# Patient Record
Sex: MALE | Race: WHITE | Employment: FULL TIME | URBAN - NONMETROPOLITAN AREA
[De-identification: names, ages, dates, MRNs, and addresses within clinical notes are randomized per-mention and may not be internally consistent; named-entity substitution may affect disease eponyms.]

---

## 2017-01-05 ENCOUNTER — OFFICE VISIT (OUTPATIENT)
Dept: UROLOGY | Age: 57
End: 2017-01-05

## 2017-01-05 VITALS
BODY MASS INDEX: 28.88 KG/M2 | WEIGHT: 195 LBS | HEIGHT: 69 IN | DIASTOLIC BLOOD PRESSURE: 86 MMHG | SYSTOLIC BLOOD PRESSURE: 128 MMHG

## 2017-01-05 DIAGNOSIS — R68.82 DECREASED LIBIDO: ICD-10-CM

## 2017-01-05 DIAGNOSIS — N41.1 CHRONIC PROSTATITIS: Primary | ICD-10-CM

## 2017-01-05 LAB
BILIRUBIN URINE: NEGATIVE
BLOOD URINE, POC: NEGATIVE
CHARACTER, URINE: CLEAR
COLOR, URINE: YELLOW
GLUCOSE URINE: NEGATIVE MG/DL
KETONES, URINE: NEGATIVE
LEUKOCYTE CLUMPS, URINE: NEGATIVE
NITRITE, URINE: NEGATIVE
PH, URINE: 7
POST VOID RESIDUAL (PVR): 54 ML
PROTEIN, URINE: NEGATIVE MG/DL
SPECIFIC GRAVITY, URINE: 1.02 (ref 1–1.03)
UROBILINOGEN, URINE: 0.2 EU/DL

## 2017-01-05 PROCEDURE — 99214 OFFICE O/P EST MOD 30 MIN: CPT | Performed by: UROLOGY

## 2017-01-05 PROCEDURE — 81003 URINALYSIS AUTO W/O SCOPE: CPT | Performed by: UROLOGY

## 2017-01-05 PROCEDURE — 51741 ELECTRO-UROFLOWMETRY FIRST: CPT | Performed by: UROLOGY

## 2017-01-05 PROCEDURE — 51798 US URINE CAPACITY MEASURE: CPT | Performed by: UROLOGY

## 2017-01-05 RX ORDER — TESTOSTERONE 16.2 MG/G
2 GEL TRANSDERMAL DAILY
Qty: 88 G | Refills: 5 | Status: SHIPPED | OUTPATIENT
Start: 2017-01-05 | End: 2017-03-06 | Stop reason: SDUPTHER

## 2017-01-05 ASSESSMENT — ENCOUNTER SYMPTOMS
EYE REDNESS: 0
EYE PAIN: 0
ABDOMINAL PAIN: 0
NAUSEA: 0
BACK PAIN: 0
CHEST TIGHTNESS: 0
SHORTNESS OF BREATH: 0
COLOR CHANGE: 0

## 2017-01-07 LAB
SEX HORMONE BINDING GLOBULIN: 33.6 NMOL/L (ref 19.3–76.4)
TESTOSTERONE FREE, CALC: 2.3 NG/DL (ref 4.8–25.7)
TESTOSTERONE FREE: 124 NG/DL (ref 300–890)

## 2017-01-09 ENCOUNTER — TELEPHONE (OUTPATIENT)
Dept: UROLOGY | Age: 57
End: 2017-01-09

## 2017-01-10 ENCOUNTER — TELEPHONE (OUTPATIENT)
Dept: UROLOGY | Age: 57
End: 2017-01-10

## 2017-01-10 DIAGNOSIS — R79.89 LOW TESTOSTERONE: Primary | ICD-10-CM

## 2017-01-27 ENCOUNTER — OFFICE VISIT (OUTPATIENT)
Dept: FAMILY MEDICINE CLINIC | Age: 57
End: 2017-01-27

## 2017-01-27 VITALS
TEMPERATURE: 97.7 F | HEIGHT: 69 IN | WEIGHT: 200.6 LBS | DIASTOLIC BLOOD PRESSURE: 70 MMHG | RESPIRATION RATE: 14 BRPM | SYSTOLIC BLOOD PRESSURE: 100 MMHG | HEART RATE: 72 BPM | BODY MASS INDEX: 29.71 KG/M2

## 2017-01-27 DIAGNOSIS — J01.40 ACUTE PANSINUSITIS, RECURRENCE NOT SPECIFIED: ICD-10-CM

## 2017-01-27 DIAGNOSIS — H65.03 BILATERAL ACUTE SEROUS OTITIS MEDIA, RECURRENCE NOT SPECIFIED: Primary | ICD-10-CM

## 2017-01-27 PROCEDURE — 99213 OFFICE O/P EST LOW 20 MIN: CPT | Performed by: FAMILY MEDICINE

## 2017-01-27 RX ORDER — CEFDINIR 300 MG/1
300 CAPSULE ORAL 2 TIMES DAILY
Qty: 20 CAPSULE | Refills: 0 | Status: SHIPPED | OUTPATIENT
Start: 2017-01-27 | End: 2017-02-06

## 2017-01-27 ASSESSMENT — ENCOUNTER SYMPTOMS
SORE THROAT: 1
SINUS PRESSURE: 1
COUGH: 1
GASTROINTESTINAL NEGATIVE: 1

## 2017-02-11 LAB — TESTOSTERONE TOTAL: 181 NG/DL

## 2017-02-16 ENCOUNTER — TELEPHONE (OUTPATIENT)
Dept: UROLOGY | Age: 57
End: 2017-02-16

## 2017-03-06 RX ORDER — TESTOSTERONE 16.2 MG/G
3 GEL TRANSDERMAL DAILY
Qty: 88 G | Refills: 5 | Status: SHIPPED | OUTPATIENT
Start: 2017-03-06 | End: 2017-03-28 | Stop reason: SDUPTHER

## 2017-03-14 LAB — TESTOSTERONE TOTAL: 116 NG/DL

## 2017-03-16 DIAGNOSIS — R79.89 LOW TESTOSTERONE: ICD-10-CM

## 2017-03-21 ENCOUNTER — TELEPHONE (OUTPATIENT)
Dept: UROLOGY | Age: 57
End: 2017-03-21

## 2017-03-23 ENCOUNTER — TELEPHONE (OUTPATIENT)
Dept: UROLOGY | Age: 57
End: 2017-03-23

## 2017-03-23 DIAGNOSIS — E29.1 MALE HYPOGONADISM: Primary | ICD-10-CM

## 2017-03-27 ENCOUNTER — TELEPHONE (OUTPATIENT)
Dept: UROLOGY | Age: 57
End: 2017-03-27

## 2017-03-28 RX ORDER — TESTOSTERONE 16.2 MG/G
4 GEL TRANSDERMAL DAILY
Qty: 176 G | Refills: 5 | Status: SHIPPED | OUTPATIENT
Start: 2017-03-28 | End: 2017-10-14 | Stop reason: SDUPTHER

## 2017-05-01 ENCOUNTER — OFFICE VISIT (OUTPATIENT)
Dept: UROLOGY | Age: 57
End: 2017-05-01

## 2017-05-01 VITALS
SYSTOLIC BLOOD PRESSURE: 122 MMHG | BODY MASS INDEX: 30.36 KG/M2 | HEIGHT: 69 IN | WEIGHT: 205 LBS | DIASTOLIC BLOOD PRESSURE: 84 MMHG

## 2017-05-01 DIAGNOSIS — N40.1 BPH WITH OBSTRUCTION/LOWER URINARY TRACT SYMPTOMS: ICD-10-CM

## 2017-05-01 DIAGNOSIS — E29.1 MALE HYPOGONADISM: Primary | ICD-10-CM

## 2017-05-01 DIAGNOSIS — N13.8 BPH WITH OBSTRUCTION/LOWER URINARY TRACT SYMPTOMS: ICD-10-CM

## 2017-05-01 LAB
BILIRUBIN URINE: NEGATIVE
BLOOD URINE, POC: NEGATIVE
CHARACTER, URINE: CLEAR
COLOR, URINE: YELLOW
GLUCOSE URINE: NEGATIVE MG/DL
KETONES, URINE: NEGATIVE
LEUKOCYTE CLUMPS, URINE: NEGATIVE
NITRITE, URINE: NEGATIVE
PH, URINE: 6
POST VOID RESIDUAL (PVR): 60 ML
PROTEIN, URINE: NEGATIVE MG/DL
SPECIFIC GRAVITY, URINE: 1.02 (ref 1–1.03)
UROBILINOGEN, URINE: 0.2 EU/DL

## 2017-05-01 PROCEDURE — 99213 OFFICE O/P EST LOW 20 MIN: CPT | Performed by: NURSE PRACTITIONER

## 2017-05-01 PROCEDURE — G8417 CALC BMI ABV UP PARAM F/U: HCPCS | Performed by: NURSE PRACTITIONER

## 2017-05-01 PROCEDURE — 51798 US URINE CAPACITY MEASURE: CPT | Performed by: NURSE PRACTITIONER

## 2017-05-01 PROCEDURE — 3017F COLORECTAL CA SCREEN DOC REV: CPT | Performed by: NURSE PRACTITIONER

## 2017-05-01 PROCEDURE — G8427 DOCREV CUR MEDS BY ELIG CLIN: HCPCS | Performed by: NURSE PRACTITIONER

## 2017-05-01 PROCEDURE — 81003 URINALYSIS AUTO W/O SCOPE: CPT | Performed by: NURSE PRACTITIONER

## 2017-05-01 PROCEDURE — 1036F TOBACCO NON-USER: CPT | Performed by: NURSE PRACTITIONER

## 2017-06-26 ENCOUNTER — TELEPHONE (OUTPATIENT)
Dept: ADMINISTRATIVE | Age: 57
End: 2017-06-26

## 2017-06-26 DIAGNOSIS — Z00.00 ANNUAL PHYSICAL EXAM: Primary | ICD-10-CM

## 2017-07-05 LAB
ALBUMIN SERPL-MCNC: 4.5 G/DL (ref 3.2–5.3)
ALK PHOSPHATASE: 61 IU/L (ref 35–121)
ALT SERPL-CCNC: 26 IU/L (ref 5–59)
ANION GAP SERPL CALCULATED.3IONS-SCNC: 12 MMOL/L
AST SERPL-CCNC: 25 IU/L (ref 10–42)
BILIRUB SERPL-MCNC: 0.5 MG/DL (ref 0.2–1.3)
BUN BLDV-MCNC: 22 MG/DL (ref 10–20)
CALCIUM SERPL-MCNC: 9.5 MG/DL (ref 8.7–10.8)
CHLORIDE BLD-SCNC: 98 MMOL/L (ref 95–111)
CHOLESTEROL, TOTAL: 187 MG/DL
CHOLESTEROL/HDL RATIO: 5.7
CHOLESTEROL/HDL RATIO: ABNORMAL
CHOLESTEROL: 187 MG/DL
CO2: 29 MMOL/L (ref 21–32)
CREAT SERPL-MCNC: 1.3 MG/DL (ref 0.5–1.3)
EGFR AFRICAN AMERICAN: 69
EGFR IF NONAFRICAN AMERICAN: 57
GLUCOSE: 110 MG/DL (ref 70–100)
HDLC SERPL-MCNC: 33 MG/DL (ref 35–70)
HDLC SERPL-MCNC: 33 MG/DL (ref 40–60)
LDL CHOLESTEROL CALCULATED: 83 MG/DL
LDL CHOLESTEROL CALCULATED: 83 MG/DL (ref 0–160)
LDL/HDL RATIO: 2.5
POTASSIUM SERPL-SCNC: 3.7 MMOL/L (ref 3.5–5.4)
SODIUM BLD-SCNC: 135 MMOL/L (ref 134–147)
TOTAL PROTEIN: 6.6 G/DL (ref 5.8–8)
TRIGL SERPL-MCNC: 354 MG/DL
TRIGL SERPL-MCNC: 354 MG/DL
VLDLC SERPL CALC-MCNC: 71 MG/DL
VLDLC SERPL CALC-MCNC: ABNORMAL MG/DL

## 2017-07-13 ENCOUNTER — OFFICE VISIT (OUTPATIENT)
Dept: FAMILY MEDICINE CLINIC | Age: 57
End: 2017-07-13

## 2017-07-13 VITALS
DIASTOLIC BLOOD PRESSURE: 70 MMHG | BODY MASS INDEX: 28.66 KG/M2 | RESPIRATION RATE: 12 BRPM | WEIGHT: 200.2 LBS | SYSTOLIC BLOOD PRESSURE: 118 MMHG | HEIGHT: 70 IN | HEART RATE: 72 BPM

## 2017-07-13 DIAGNOSIS — K21.9 GASTROESOPHAGEAL REFLUX DISEASE, ESOPHAGITIS PRESENCE NOT SPECIFIED: ICD-10-CM

## 2017-07-13 DIAGNOSIS — I10 ESSENTIAL HYPERTENSION: ICD-10-CM

## 2017-07-13 DIAGNOSIS — B00.9 HERPES INFECTION: ICD-10-CM

## 2017-07-13 DIAGNOSIS — Z00.00 ANNUAL PHYSICAL EXAM: Primary | ICD-10-CM

## 2017-07-13 PROCEDURE — 99396 PREV VISIT EST AGE 40-64: CPT | Performed by: FAMILY MEDICINE

## 2017-07-13 RX ORDER — VALACYCLOVIR HYDROCHLORIDE 500 MG/1
500 TABLET, FILM COATED ORAL DAILY
Qty: 90 TABLET | Refills: 3 | Status: SHIPPED | OUTPATIENT
Start: 2017-07-13 | End: 2018-06-19 | Stop reason: SDUPTHER

## 2017-07-13 RX ORDER — METOPROLOL SUCCINATE 100 MG/1
100 TABLET, EXTENDED RELEASE ORAL DAILY
Qty: 90 TABLET | Refills: 3 | Status: SHIPPED | OUTPATIENT
Start: 2017-07-13 | End: 2018-06-19 | Stop reason: SDUPTHER

## 2017-07-13 RX ORDER — VALSARTAN AND HYDROCHLOROTHIAZIDE 320; 25 MG/1; MG/1
TABLET, FILM COATED ORAL
Qty: 90 TABLET | Refills: 3 | Status: SHIPPED | OUTPATIENT
Start: 2017-07-13 | End: 2018-06-19 | Stop reason: SDUPTHER

## 2017-07-13 RX ORDER — ESOMEPRAZOLE MAGNESIUM 40 MG/1
40 CAPSULE, DELAYED RELEASE ORAL
Qty: 90 CAPSULE | Refills: 3 | Status: SHIPPED | OUTPATIENT
Start: 2017-07-13 | End: 2018-06-19 | Stop reason: SDUPTHER

## 2017-07-13 RX ORDER — TESTOSTERONE 16.2 MG/G
4 GEL TRANSDERMAL DAILY
Qty: 176 G | Refills: 5 | Status: CANCELLED | OUTPATIENT
Start: 2017-07-13 | End: 2017-08-12

## 2017-07-13 ASSESSMENT — ENCOUNTER SYMPTOMS
SHORTNESS OF BREATH: 0
CHEST TIGHTNESS: 0
BLOOD IN STOOL: 0
ABDOMINAL PAIN: 0
EYES NEGATIVE: 1

## 2017-07-13 ASSESSMENT — PATIENT HEALTH QUESTIONNAIRE - PHQ9
1. LITTLE INTEREST OR PLEASURE IN DOING THINGS: 0
2. FEELING DOWN, DEPRESSED OR HOPELESS: 0
SUM OF ALL RESPONSES TO PHQ QUESTIONS 1-9: 0
SUM OF ALL RESPONSES TO PHQ9 QUESTIONS 1 & 2: 0

## 2017-07-25 LAB
A/G RATIO: NORMAL
ALBUMIN SERPL-MCNC: 4.9 G/DL
ALBUMIN SERPL-MCNC: 4.9 G/DL (ref 3.2–5.3)
ALK PHOSPHATASE: 60 IU/L (ref 35–121)
ALP BLD-CCNC: 60 U/L
ALT SERPL-CCNC: 25 IU/L (ref 5–59)
ALT SERPL-CCNC: 25 U/L
AST SERPL-CCNC: 21 IU/L (ref 10–42)
AST SERPL-CCNC: 21 U/L
BILIRUB SERPL-MCNC: 0.5 MG/DL (ref 0.1–1.4)
BILIRUB SERPL-MCNC: 0.5 MG/DL (ref 0.2–1.3)
BILIRUBIN DIRECT: 0.1 MG/DL
BILIRUBIN DIRECT: 0.1 MG/DL (ref 0–0.2)
BILIRUBIN, INDIRECT: NORMAL
CHOLESTEROL, TOTAL: 189 MG/DL
CHOLESTEROL/HDL RATIO: 5.9
CHOLESTEROL/HDL RATIO: 5.9
CHOLESTEROL: 189 MG/DL
GLOBULIN: NORMAL
HCT VFR BLD CALC: 41.7 % (ref 41.4–51)
HCT VFR BLD CALC: 41.7 % (ref 41–53)
HDLC SERPL-MCNC: 32 MG/DL (ref 35–70)
HDLC SERPL-MCNC: 32 MG/DL (ref 40–60)
HEMOGLOBIN: 14.2 G/DL (ref 13.5–17.5)
HEMOGLOBIN: 14.2 G/DL (ref 13.8–17)
LDL CHOLESTEROL CALCULATED: 102 MG/DL
LDL CHOLESTEROL CALCULATED: 102 MG/DL (ref 0–160)
LDL/HDL RATIO: 3.2
PROSTATE SPECIFIC ANTIGEN: 0.21 NG/ML
PROTEIN TOTAL: 7.2 G/DL
PSA, ULTRASENSITIVE: 0.21 NG/ML
TESTOSTERONE TOTAL: 122
TESTOSTERONE TOTAL: 122 NG/DL
TOTAL PROTEIN: 7.2 G/DL (ref 5.8–8)
TRIGL SERPL-MCNC: 277 MG/DL
TRIGL SERPL-MCNC: 277 MG/DL
VLDLC SERPL CALC-MCNC: 55 MG/DL
VLDLC SERPL CALC-MCNC: 55 MG/DL

## 2017-07-31 ENCOUNTER — OFFICE VISIT (OUTPATIENT)
Dept: UROLOGY | Age: 57
End: 2017-07-31
Payer: COMMERCIAL

## 2017-07-31 VITALS
WEIGHT: 200 LBS | DIASTOLIC BLOOD PRESSURE: 84 MMHG | HEIGHT: 69 IN | SYSTOLIC BLOOD PRESSURE: 122 MMHG | BODY MASS INDEX: 29.62 KG/M2

## 2017-07-31 DIAGNOSIS — E29.1 HYPOGONADISM IN MALE: Primary | ICD-10-CM

## 2017-07-31 PROCEDURE — 1036F TOBACCO NON-USER: CPT | Performed by: NURSE PRACTITIONER

## 2017-07-31 PROCEDURE — 3017F COLORECTAL CA SCREEN DOC REV: CPT | Performed by: NURSE PRACTITIONER

## 2017-07-31 PROCEDURE — 99213 OFFICE O/P EST LOW 20 MIN: CPT | Performed by: NURSE PRACTITIONER

## 2017-07-31 PROCEDURE — G8417 CALC BMI ABV UP PARAM F/U: HCPCS | Performed by: NURSE PRACTITIONER

## 2017-07-31 PROCEDURE — G8428 CUR MEDS NOT DOCUMENT: HCPCS | Performed by: NURSE PRACTITIONER

## 2017-08-28 ENCOUNTER — TELEPHONE (OUTPATIENT)
Dept: FAMILY MEDICINE CLINIC | Age: 57
End: 2017-08-28

## 2017-10-19 ENCOUNTER — TELEPHONE (OUTPATIENT)
Dept: UROLOGY | Age: 57
End: 2017-10-19

## 2017-10-20 RX ORDER — TESTOSTERONE 16.2 MG/G
4 GEL TRANSDERMAL DAILY
Qty: 2 BOTTLE | Refills: 3 | Status: SHIPPED | OUTPATIENT
Start: 2017-10-20 | End: 2018-03-09 | Stop reason: SDUPTHER

## 2017-11-16 ENCOUNTER — TELEPHONE (OUTPATIENT)
Dept: FAMILY MEDICINE CLINIC | Age: 57
End: 2017-11-16

## 2017-11-16 NOTE — TELEPHONE ENCOUNTER
11/16/17  Dolv: 7/13/17 Patient requesting chiropractic referral to [facility] for consult as states his insurance requires. Patient having tingling/numbness in Lt fingers and pain in neck on left side near scalp. Patient declined appointment.   Thanks/blm

## 2018-01-27 LAB — PSA, ULTRASENSITIVE: 0.25 NG/ML

## 2018-03-13 ENCOUNTER — TELEPHONE (OUTPATIENT)
Dept: UROLOGY | Age: 58
End: 2018-03-13

## 2018-03-13 RX ORDER — TESTOSTERONE 16.2 MG/G
4 GEL TRANSDERMAL DAILY
Qty: 2 BOTTLE | Refills: 1 | Status: SHIPPED | OUTPATIENT
Start: 2018-03-13 | End: 2018-05-28 | Stop reason: SDUPTHER

## 2018-05-31 ENCOUNTER — TELEPHONE (OUTPATIENT)
Dept: FAMILY MEDICINE CLINIC | Age: 58
End: 2018-05-31

## 2018-05-31 DIAGNOSIS — I10 ESSENTIAL HYPERTENSION: Primary | ICD-10-CM

## 2018-05-31 LAB
ALBUMIN SERPL-MCNC: 4.5 G/DL (ref 3.5–5.2)
ALK PHOSPHATASE: 55 U/L (ref 39–118)
ALT SERPL-CCNC: 36 U/L (ref 5–50)
ANION GAP SERPL CALCULATED.3IONS-SCNC: 11 MEQ/L (ref 10–19)
AST SERPL-CCNC: 28 U/L (ref 9–50)
BILIRUB SERPL-MCNC: 0.4 MG/DL
BILIRUBIN DIRECT: <0.2 MG/DL
BUN BLDV-MCNC: 17 MG/DL (ref 8–23)
CALCIUM SERPL-MCNC: 9.4 MG/DL (ref 8.5–10.5)
CHLORIDE BLD-SCNC: 97 MEQ/L (ref 95–107)
CHOLESTEROL/HDL RATIO: 5.9
CHOLESTEROL: 190 MG/DL
CO2: 30 MEQ/L (ref 19–31)
CREAT SERPL-MCNC: 1.1 MG/DL (ref 0.8–1.4)
EGFR AFRICAN AMERICAN: 85.3 ML/MIN/1.73 M2
EGFR IF NONAFRICAN AMERICAN: 73.6 ML/MIN/1.73 M2
GLUCOSE: 128 MG/DL (ref 70–99)
HCT VFR BLD CALC: 40.8 % (ref 41.4–51)
HDLC SERPL-MCNC: 32 MG/DL
HEMOGLOBIN: 13.9 G/DL (ref 13.8–17)
LDL CHOLESTEROL CALCULATED: 105 MG/DL
LDL/HDL RATIO: 3.3
POTASSIUM SERPL-SCNC: 5.1 MEQ/L (ref 3.5–5.4)
SODIUM BLD-SCNC: 138 MEQ/L (ref 135–146)
TOTAL PROTEIN: 7 G/DL (ref 6.1–8.3)
TRIGL SERPL-MCNC: 263 MG/DL
VLDLC SERPL CALC-MCNC: 53 MG/DL

## 2018-06-01 ENCOUNTER — TELEPHONE (OUTPATIENT)
Dept: FAMILY MEDICINE CLINIC | Age: 58
End: 2018-06-01

## 2018-06-01 DIAGNOSIS — R73.01 IMPAIRED FASTING GLUCOSE: Primary | ICD-10-CM

## 2018-06-01 LAB
PSA, ULTRASENSITIVE: 0.21 NG/ML
TESTOSTERONE TOTAL: 301 NG/DL (ref 300–890)

## 2018-06-05 LAB
AVERAGE GLUCOSE: 131 MG/DL (ref 66–114)
HBA1C MFR BLD: 6.2 % (ref 4.2–5.8)

## 2018-06-19 ENCOUNTER — OFFICE VISIT (OUTPATIENT)
Dept: FAMILY MEDICINE CLINIC | Age: 58
End: 2018-06-19
Payer: COMMERCIAL

## 2018-06-19 VITALS
WEIGHT: 201 LBS | RESPIRATION RATE: 16 BRPM | HEIGHT: 69 IN | DIASTOLIC BLOOD PRESSURE: 74 MMHG | TEMPERATURE: 97.7 F | SYSTOLIC BLOOD PRESSURE: 106 MMHG | BODY MASS INDEX: 29.77 KG/M2 | HEART RATE: 68 BPM

## 2018-06-19 DIAGNOSIS — I10 ESSENTIAL HYPERTENSION: ICD-10-CM

## 2018-06-19 DIAGNOSIS — K21.9 GASTROESOPHAGEAL REFLUX DISEASE, ESOPHAGITIS PRESENCE NOT SPECIFIED: ICD-10-CM

## 2018-06-19 DIAGNOSIS — B00.9 HERPES INFECTION: ICD-10-CM

## 2018-06-19 DIAGNOSIS — Z00.00 ANNUAL PHYSICAL EXAM: Primary | ICD-10-CM

## 2018-06-19 DIAGNOSIS — R73.01 IFG (IMPAIRED FASTING GLUCOSE): ICD-10-CM

## 2018-06-19 PROCEDURE — 99396 PREV VISIT EST AGE 40-64: CPT | Performed by: FAMILY MEDICINE

## 2018-06-19 RX ORDER — ESOMEPRAZOLE MAGNESIUM 40 MG/1
40 CAPSULE, DELAYED RELEASE ORAL
Qty: 90 CAPSULE | Refills: 3 | Status: SHIPPED | OUTPATIENT
Start: 2018-06-19 | End: 2019-04-11 | Stop reason: SDUPTHER

## 2018-06-19 RX ORDER — VALSARTAN AND HYDROCHLOROTHIAZIDE 320; 25 MG/1; MG/1
TABLET, FILM COATED ORAL
Qty: 90 TABLET | Refills: 3 | Status: SHIPPED | OUTPATIENT
Start: 2018-06-19 | End: 2019-06-18 | Stop reason: SDUPTHER

## 2018-06-19 RX ORDER — MELOXICAM 15 MG/1
TABLET ORAL
Refills: 0 | COMMUNITY
Start: 2018-05-28 | End: 2019-06-18 | Stop reason: ALTCHOICE

## 2018-06-19 RX ORDER — METOPROLOL SUCCINATE 100 MG/1
100 TABLET, EXTENDED RELEASE ORAL DAILY
Qty: 90 TABLET | Refills: 3 | Status: SHIPPED | OUTPATIENT
Start: 2018-06-19 | End: 2019-06-18 | Stop reason: SDUPTHER

## 2018-06-19 RX ORDER — VALACYCLOVIR HYDROCHLORIDE 500 MG/1
500 TABLET, FILM COATED ORAL DAILY
Qty: 90 TABLET | Refills: 3 | Status: SHIPPED | OUTPATIENT
Start: 2018-06-19 | End: 2019-06-18 | Stop reason: SDUPTHER

## 2018-06-19 ASSESSMENT — ENCOUNTER SYMPTOMS
BLOOD IN STOOL: 0
ABDOMINAL PAIN: 0
EYES NEGATIVE: 1
CHEST TIGHTNESS: 0
SHORTNESS OF BREATH: 0

## 2018-07-31 ENCOUNTER — OFFICE VISIT (OUTPATIENT)
Dept: UROLOGY | Age: 58
End: 2018-07-31
Payer: COMMERCIAL

## 2018-07-31 VITALS
DIASTOLIC BLOOD PRESSURE: 89 MMHG | BODY MASS INDEX: 29.92 KG/M2 | HEIGHT: 69 IN | WEIGHT: 202 LBS | SYSTOLIC BLOOD PRESSURE: 132 MMHG

## 2018-07-31 DIAGNOSIS — E34.9 TESTOSTERONE DEFICIENCY: Primary | ICD-10-CM

## 2018-07-31 LAB
BILIRUBIN URINE: NEGATIVE
BLOOD URINE, POC: NEGATIVE
CHARACTER, URINE: CLEAR
COLOR, URINE: YELLOW
GLUCOSE URINE: NEGATIVE MG/DL
KETONES, URINE: NEGATIVE
LEUKOCYTE CLUMPS, URINE: NEGATIVE
NITRITE, URINE: NEGATIVE
PH, URINE: 7
PROTEIN, URINE: NEGATIVE MG/DL
SPECIFIC GRAVITY, URINE: 1.02 (ref 1–1.03)
UROBILINOGEN, URINE: 0.2 EU/DL

## 2018-07-31 PROCEDURE — 81003 URINALYSIS AUTO W/O SCOPE: CPT | Performed by: NURSE PRACTITIONER

## 2018-07-31 PROCEDURE — 99213 OFFICE O/P EST LOW 20 MIN: CPT | Performed by: NURSE PRACTITIONER

## 2018-07-31 PROCEDURE — G8427 DOCREV CUR MEDS BY ELIG CLIN: HCPCS | Performed by: NURSE PRACTITIONER

## 2018-07-31 PROCEDURE — G8417 CALC BMI ABV UP PARAM F/U: HCPCS | Performed by: NURSE PRACTITIONER

## 2018-07-31 PROCEDURE — 1036F TOBACCO NON-USER: CPT | Performed by: NURSE PRACTITIONER

## 2018-07-31 PROCEDURE — 3017F COLORECTAL CA SCREEN DOC REV: CPT | Performed by: NURSE PRACTITIONER

## 2018-08-08 ENCOUNTER — TELEPHONE (OUTPATIENT)
Dept: UROLOGY | Age: 58
End: 2018-08-08

## 2018-08-08 DIAGNOSIS — E34.9 TESTOSTERONE DEFICIENCY: ICD-10-CM

## 2018-08-08 RX ORDER — TESTOSTERONE 16.2 MG/G
GEL TRANSDERMAL
Qty: 150 G | Refills: 0 | Status: CANCELLED | OUTPATIENT
Start: 2018-08-08 | End: 2019-01-08

## 2018-08-09 RX ORDER — TESTOSTERONE 16.2 MG/G
4 GEL TRANSDERMAL DAILY
Qty: 2 BOTTLE | Refills: 3 | Status: SHIPPED | OUTPATIENT
Start: 2018-08-09 | End: 2018-12-29 | Stop reason: SDUPTHER

## 2018-09-15 LAB
AVERAGE GLUCOSE: 117 MG/DL (ref 66–114)
HBA1C MFR BLD: 5.7 % (ref 4.2–5.8)

## 2018-12-29 ENCOUNTER — TELEPHONE (OUTPATIENT)
Dept: UROLOGY | Age: 58
End: 2018-12-29

## 2018-12-29 DIAGNOSIS — E34.9 TESTOSTERONE DEFICIENCY: ICD-10-CM

## 2019-01-02 RX ORDER — TESTOSTERONE 16.2 MG/G
GEL TRANSDERMAL
Qty: 150 G | Refills: 0 | Status: SHIPPED | OUTPATIENT
Start: 2019-01-02 | End: 2019-01-02 | Stop reason: SDUPTHER

## 2019-01-02 RX ORDER — TESTOSTERONE 16.2 MG/G
GEL TRANSDERMAL
Qty: 150 G | Refills: 3 | Status: SHIPPED | OUTPATIENT
Start: 2019-01-02 | End: 2019-08-09

## 2019-04-11 DIAGNOSIS — K21.9 GASTROESOPHAGEAL REFLUX DISEASE, ESOPHAGITIS PRESENCE NOT SPECIFIED: ICD-10-CM

## 2019-04-11 RX ORDER — ESOMEPRAZOLE MAGNESIUM 40 MG/1
40 CAPSULE, DELAYED RELEASE ORAL
Qty: 90 CAPSULE | Refills: 0 | Status: SHIPPED | OUTPATIENT
Start: 2019-04-11 | End: 2019-06-18 | Stop reason: SDUPTHER

## 2019-04-11 NOTE — TELEPHONE ENCOUNTER
Rx EP'd to pharmacy. Please notify patient.       Requested Prescriptions     Signed Prescriptions Disp Refills    esomeprazole (NEXIUM) 40 MG delayed release capsule 90 capsule 0     Sig: Take 1 capsule by mouth every morning (before breakfast)     Authorizing Provider: Ceci Flowers           Electronically signed by Violet Herr MD on 4/11/2019 at 1:06 PM

## 2019-04-11 NOTE — TELEPHONE ENCOUNTER
4/11/19 patient lost his last refill of Nexium 40 mg qd and is requesting #60 replacement to [facility] on [address].   Patient states it needs to be noted of \"lost medicine\" - patient needing ASAP as leaving for [place] in early AM.  Radha/david  Dolv: 6/19/18

## 2019-05-31 ENCOUNTER — TELEPHONE (OUTPATIENT)
Dept: FAMILY MEDICINE CLINIC | Age: 59
End: 2019-05-31

## 2019-05-31 DIAGNOSIS — R73.01 IFG (IMPAIRED FASTING GLUCOSE): ICD-10-CM

## 2019-05-31 DIAGNOSIS — Z00.00 LABORATORY EXAM ORDERED AS PART OF ROUTINE GENERAL MEDICAL EXAMINATION: Primary | ICD-10-CM

## 2019-05-31 NOTE — TELEPHONE ENCOUNTER
Patient scheduled a Wellness Exam for 6/18/19. He's requesting an order for labs to do prior. He will  the order. Please let him know when it's ready.

## 2019-06-04 LAB
ALBUMIN SERPL-MCNC: 4.4 G/DL (ref 3.2–5.3)
ALK PHOSPHATASE: 53 U/L (ref 39–130)
ALT SERPL-CCNC: 24 U/L (ref 0–40)
ANION GAP SERPL CALCULATED.3IONS-SCNC: 8 MMOL/L (ref 4–12)
AST SERPL-CCNC: 21 U/L (ref 0–41)
AVERAGE GLUCOSE: 123 MG/DL (ref 66–114)
BILIRUB SERPL-MCNC: 0.5 MG/DL (ref 0.3–1.2)
BUN BLDV-MCNC: 19 MG/DL (ref 5–23)
CALCIUM SERPL-MCNC: 9.7 MG/DL (ref 8.5–10.5)
CHLORIDE BLD-SCNC: 97 MMOL/L (ref 98–109)
CHOLESTEROL/HDL RATIO: 6.4 (ref 1–5)
CHOLESTEROL: 192 MG/DL (ref 150–200)
CO2: 29 MMOL/L (ref 22–32)
CREAT SERPL-MCNC: 1.11 MG/DL (ref 0.6–1.3)
EGFR AFRICAN AMERICAN: >60 ML/MIN/1.73SQ.M
EGFR IF NONAFRICAN AMERICAN: >60 ML/MIN/1.73SQ.M
GLUCOSE: 120 MG/DL (ref 65–99)
HBA1C MFR BLD: 5.9 %
HDLC SERPL-MCNC: 30 MG/DL
LDL CHOLESTEROL CALCULATED: 101 MG/DL
LDL/HDL RATIO: 3.4
POTASSIUM SERPL-SCNC: 4.4 MMOL/L (ref 3.5–5)
SODIUM BLD-SCNC: 134 MMOL/L (ref 134–146)
TOTAL PROTEIN: 7.1 G/DL (ref 6–8)
TRIGL SERPL-MCNC: 306 MG/DL (ref 27–150)
VLDLC SERPL CALC-MCNC: 61 MG/DL (ref 0–30)

## 2019-06-18 ENCOUNTER — OFFICE VISIT (OUTPATIENT)
Dept: FAMILY MEDICINE CLINIC | Age: 59
End: 2019-06-18
Payer: COMMERCIAL

## 2019-06-18 VITALS
TEMPERATURE: 97.6 F | SYSTOLIC BLOOD PRESSURE: 128 MMHG | RESPIRATION RATE: 16 BRPM | HEART RATE: 72 BPM | DIASTOLIC BLOOD PRESSURE: 76 MMHG | BODY MASS INDEX: 29.99 KG/M2 | HEIGHT: 69 IN | WEIGHT: 202.5 LBS

## 2019-06-18 DIAGNOSIS — K21.9 GASTROESOPHAGEAL REFLUX DISEASE, ESOPHAGITIS PRESENCE NOT SPECIFIED: ICD-10-CM

## 2019-06-18 DIAGNOSIS — E78.1 HYPERTRIGLYCERIDEMIA: ICD-10-CM

## 2019-06-18 DIAGNOSIS — Z00.00 ANNUAL PHYSICAL EXAM: Primary | ICD-10-CM

## 2019-06-18 DIAGNOSIS — I10 ESSENTIAL HYPERTENSION: ICD-10-CM

## 2019-06-18 DIAGNOSIS — B00.9 HERPES INFECTION: ICD-10-CM

## 2019-06-18 DIAGNOSIS — R73.01 IMPAIRED FASTING GLUCOSE: ICD-10-CM

## 2019-06-18 PROCEDURE — 99396 PREV VISIT EST AGE 40-64: CPT | Performed by: FAMILY MEDICINE

## 2019-06-18 RX ORDER — METOPROLOL SUCCINATE 100 MG/1
100 TABLET, EXTENDED RELEASE ORAL DAILY
Qty: 90 TABLET | Refills: 3 | Status: SHIPPED | OUTPATIENT
Start: 2019-06-18 | End: 2019-07-30 | Stop reason: SDUPTHER

## 2019-06-18 RX ORDER — VALACYCLOVIR HYDROCHLORIDE 500 MG/1
500 TABLET, FILM COATED ORAL DAILY
Qty: 90 TABLET | Refills: 3 | Status: SHIPPED | OUTPATIENT
Start: 2019-06-18 | End: 2020-06-08 | Stop reason: SDUPTHER

## 2019-06-18 RX ORDER — IBUPROFEN 200 MG
200 TABLET ORAL EVERY 6 HOURS PRN
COMMUNITY

## 2019-06-18 RX ORDER — ESOMEPRAZOLE MAGNESIUM 40 MG/1
40 CAPSULE, DELAYED RELEASE ORAL
Qty: 90 CAPSULE | Refills: 3 | Status: SHIPPED | OUTPATIENT
Start: 2019-06-18 | End: 2020-06-08 | Stop reason: SDUPTHER

## 2019-06-18 RX ORDER — VALSARTAN AND HYDROCHLOROTHIAZIDE 320; 25 MG/1; MG/1
TABLET, FILM COATED ORAL
Qty: 90 TABLET | Refills: 3 | Status: SHIPPED | OUTPATIENT
Start: 2019-06-18 | End: 2020-06-08 | Stop reason: SDUPTHER

## 2019-06-18 ASSESSMENT — ENCOUNTER SYMPTOMS
ABDOMINAL PAIN: 0
SHORTNESS OF BREATH: 0
CHEST TIGHTNESS: 0
BLOOD IN STOOL: 0
EYES NEGATIVE: 1

## 2019-06-18 NOTE — PROGRESS NOTES
Chief Complaint   Patient presents with    Annual Exam     annual wellness exam for employer. labs done 6/4/19.  Medication Refill     order pending       SUBJECTIVE     Qamar Arnold is a 60 y.o.male    Pt presents for annual wellness physical exam.        Pt stable since last visit- no new problems for diagnoses listed below:  Patient Active Problem List   Diagnosis    HTN (hypertension)    Hypertriglyceridemia    GERD (gastroesophageal reflux disease)    Depression    Chronic prostatitis    Erectile dysfunction    Herpes infection    IFG (impaired fasting glucose)     Doing great. Feels well and has had no changes in his health since his last visit. BPs are stable and he only checks when he's at the doctor. He exercises regularly on a treadmill. Weight stable. Sees Dr. Joshua Morales for his anxiety and depression. Takes all meds as directed and denies side effects. No recent illnesses or hospitalizations. No changes in family history. Has wellness forms for us to complete. Nonsmoker. Body mass index is 29.9 kg/m². Review of Systems   Constitutional: Negative for chills, fatigue, fever and unexpected weight change. HENT: Negative. Eyes: Negative. Respiratory: Negative for chest tightness and shortness of breath. Cardiovascular: Negative for chest pain, palpitations and leg swelling. Gastrointestinal: Negative for abdominal pain and blood in stool. Genitourinary: Negative for dysuria and hematuria. Musculoskeletal: Negative for arthralgias and joint swelling. Skin: Negative for rash. Neurological: Negative for dizziness. Psychiatric/Behavioral: Negative. All other systems reviewed and are negative.           OBJECTIVE     /76   Pulse 72   Temp 97.6 °F (36.4 °C) (Temporal)   Resp 16   Ht 5' 9\" (1.753 m)   Wt 202 lb 8 oz (91.9 kg)   BMI 29.90 kg/m²     Wt Readings from Last 3 Encounters:   06/18/19 202 lb 8 oz (91.9 kg)   07/31/18 202 lb (91.6 kg) <5.5 % 5.9 (H)   AVERAGE GLUCOSE      66 - 114 mg/dL 123 (H)         Immunization History   Administered Date(s) Administered    Influenza Virus Vaccine 10/01/2013, 11/09/2015, 11/15/2016    Influenza Whole 11/01/2011    Influenza, Monica Cho, 3 yrs and older, IM, PF (Fluzone 3 yrs and older or Afluria 5 yrs and older) 11/09/2018    Tdap (Boostrix, Adacel) 08/13/2013         Health Maintenance   Topic Date Due    Hepatitis C screen  1960    HIV screen  12/30/1974    Shingles Vaccine (1 of 2) 12/30/2009    PSA counseling  05/31/2019    Colon cancer screen colonoscopy  09/14/2019    A1C test (Diabetic or Prediabetic)  06/04/2020    Potassium monitoring  06/04/2020    Creatinine monitoring  06/04/2020    DTaP/Tdap/Td vaccine (2 - Td) 08/13/2023    Lipid screen  06/04/2024    Flu vaccine  Completed    Pneumococcal 0-64 years Vaccine  Aged Out         ASSESSMENT      1. Annual physical exam    2. Essential hypertension    3. Impaired fasting glucose    4. Hypertriglyceridemia    5. Gastroesophageal reflux disease, esophagitis presence not specified    6. Herpes infection        PLAN        Continue current meds. Refills as below. Wellness forms completed for his insurance. Colonoscopy due in November    Requested Prescriptions     Signed Prescriptions Disp Refills    esomeprazole (NEXIUM) 40 MG delayed release capsule 90 capsule 3     Sig: Take 1 capsule by mouth every morning (before breakfast)    valsartan-hydrochlorothiazide (DIOVAN-HCT) 320-25 MG per tablet 90 tablet 3     Sig: TAKE 1 TABLET BY MOUTH EVERY DAY    valACYclovir (VALTREX) 500 MG tablet 90 tablet 3     Sig: Take 1 tablet by mouth daily    metoprolol succinate (TOPROL XL) 100 MG extended release tablet 90 tablet 3     Sig: Take 1 tablet by mouth daily       Yanely Massey received counseling on the following healthy behaviors: nutrition, exercise and medication adherence    Patient given educational materials on wellness for age.     I have instructed Oliverio  to complete a self tracking handout on Blood Pressures  and instructed them to bring it with them to his next appointment. Discussed use, benefit, and side effects of prescribed medications. Barriers to medication compliance addressed. All patient questions answered. Pt voiced understanding.      Follow up yearly and prn        Electronically signed by Dominic Mcclellan MD on 6/18/2019 at 4:50 PM

## 2019-06-25 DIAGNOSIS — E34.9 TESTOSTERONE DEFICIENCY: ICD-10-CM

## 2019-06-26 RX ORDER — TESTOSTERONE 16.2 MG/G
GEL TRANSDERMAL
Qty: 150 G | Refills: 5 | Status: SHIPPED | OUTPATIENT
Start: 2019-06-26 | End: 2019-11-07 | Stop reason: SDUPTHER

## 2019-06-26 NOTE — TELEPHONE ENCOUNTER
Prescription Refill Request    The pharmacy is requesting a refill of Androgel, 20.25 mg/act place 4 actuations daily  Last prescribed on 01/02/2019    Last office visit 07/31/2018 with provider Jef Healy CNP  Next office visit 08/09/2019 with provider Jef Healy CNP    Please send refill to [facility]    Thank you.

## 2019-06-27 ENCOUNTER — OFFICE VISIT (OUTPATIENT)
Dept: FAMILY MEDICINE CLINIC | Age: 59
End: 2019-06-27
Payer: COMMERCIAL

## 2019-06-27 VITALS
WEIGHT: 207.6 LBS | BODY MASS INDEX: 30.66 KG/M2 | RESPIRATION RATE: 16 BRPM | DIASTOLIC BLOOD PRESSURE: 80 MMHG | SYSTOLIC BLOOD PRESSURE: 138 MMHG | HEART RATE: 76 BPM

## 2019-06-27 DIAGNOSIS — L23.7 POISON IVY DERMATITIS: Primary | ICD-10-CM

## 2019-06-27 PROCEDURE — G8417 CALC BMI ABV UP PARAM F/U: HCPCS | Performed by: FAMILY MEDICINE

## 2019-06-27 PROCEDURE — G8427 DOCREV CUR MEDS BY ELIG CLIN: HCPCS | Performed by: FAMILY MEDICINE

## 2019-06-27 PROCEDURE — 1036F TOBACCO NON-USER: CPT | Performed by: FAMILY MEDICINE

## 2019-06-27 PROCEDURE — 99213 OFFICE O/P EST LOW 20 MIN: CPT | Performed by: FAMILY MEDICINE

## 2019-06-27 PROCEDURE — 3017F COLORECTAL CA SCREEN DOC REV: CPT | Performed by: FAMILY MEDICINE

## 2019-06-27 PROCEDURE — 96372 THER/PROPH/DIAG INJ SC/IM: CPT | Performed by: FAMILY MEDICINE

## 2019-06-27 RX ORDER — PREDNISONE 10 MG/1
TABLET ORAL
Qty: 30 TABLET | Refills: 0 | Status: SHIPPED | OUTPATIENT
Start: 2019-06-27 | End: 2019-07-07

## 2019-06-27 RX ORDER — METHYLPREDNISOLONE ACETATE 80 MG/ML
80 INJECTION, SUSPENSION INTRA-ARTICULAR; INTRALESIONAL; INTRAMUSCULAR; SOFT TISSUE ONCE
Status: COMPLETED | OUTPATIENT
Start: 2019-06-27 | End: 2019-06-27

## 2019-06-27 RX ADMIN — METHYLPREDNISOLONE ACETATE 80 MG: 80 INJECTION, SUSPENSION INTRA-ARTICULAR; INTRALESIONAL; INTRAMUSCULAR; SOFT TISSUE at 15:56

## 2019-06-27 ASSESSMENT — ENCOUNTER SYMPTOMS
RESPIRATORY NEGATIVE: 1
GASTROINTESTINAL NEGATIVE: 1

## 2019-06-27 NOTE — PROGRESS NOTES
Chief Complaint   Patient presents with   [facility]     x 4 days. DEMI Ackerman is a 60 y.o.male      Pt complains of an itchy and spreading rash on the arms and legs since working outside in a woods over the weekend. He has tried topical cortisone cream without relief. The rash is spreading. The lesions are bleeding due to scratching. No new soaps, lotions, detergents. Nonsmoker. Body mass index is 30.66 kg/m². Review of Systems   Constitutional: Negative for fatigue and fever. HENT: Negative. Respiratory: Negative. Cardiovascular: Negative. Gastrointestinal: Negative. Musculoskeletal: Negative for arthralgias and joint swelling. Skin: Positive for rash. Neurological: Negative for dizziness and headaches. All other systems reviewed and are negative. OBJECTIVE     /80 (Site: Left Upper Arm, Position: Sitting, Cuff Size: Large Adult)   Pulse 76   Resp 16   Wt 207 lb 9.6 oz (94.2 kg)   BMI 30.66 kg/m²     Physical Exam   Constitutional: He appears well-developed and well-nourished. Cardiovascular: Normal rate and regular rhythm. No murmur heard. Pulmonary/Chest: Effort normal and breath sounds normal. He has no wheezes. Skin: Rash noted. ASSESSMENT      1. Poison ivy dermatitis        PLAN     Requested Prescriptions     Signed Prescriptions Disp Refills    predniSONE (DELTASONE) 10 MG tablet 30 tablet 0     Sig: Take 4 po qd x 3 days, then 3 po qd x 3 days, then 2 po qd x 3 days, then 1 po qd x 3 days.        DepoMedrol 80mg IM    OTC antihistamine prn itching    Follow up if not better            Electronically signed by Waqas Mcgrath MD on 6/27/2019 at 4:29 PM

## 2019-07-30 ENCOUNTER — TELEPHONE (OUTPATIENT)
Dept: FAMILY MEDICINE CLINIC | Age: 59
End: 2019-07-30

## 2019-07-30 DIAGNOSIS — I10 ESSENTIAL HYPERTENSION: ICD-10-CM

## 2019-07-30 RX ORDER — METOPROLOL SUCCINATE 100 MG/1
100 TABLET, EXTENDED RELEASE ORAL DAILY
Qty: 90 TABLET | Refills: 3 | Status: SHIPPED | OUTPATIENT
Start: 2019-07-30 | End: 2020-06-08 | Stop reason: SDUPTHER

## 2019-08-05 LAB
HCT VFR BLD CALC: 38.3 % (ref 39–49)
HEMOGLOBIN: 13.5 G/DL (ref 13.1–17.3)
PSA, ULTRASENSITIVE: 0.22 NG/ML (ref 0–4)
TESTOSTERONE TOTAL: 4.08 NG/ML (ref 1.68–7.46)

## 2019-08-09 ENCOUNTER — OFFICE VISIT (OUTPATIENT)
Dept: UROLOGY | Age: 59
End: 2019-08-09
Payer: COMMERCIAL

## 2019-08-09 VITALS
WEIGHT: 205 LBS | SYSTOLIC BLOOD PRESSURE: 122 MMHG | BODY MASS INDEX: 30.36 KG/M2 | HEIGHT: 69 IN | DIASTOLIC BLOOD PRESSURE: 70 MMHG

## 2019-08-09 DIAGNOSIS — E34.9 TESTOSTERONE DEFICIENCY: Primary | ICD-10-CM

## 2019-08-09 PROCEDURE — 3017F COLORECTAL CA SCREEN DOC REV: CPT | Performed by: NURSE PRACTITIONER

## 2019-08-09 PROCEDURE — G8427 DOCREV CUR MEDS BY ELIG CLIN: HCPCS | Performed by: NURSE PRACTITIONER

## 2019-08-09 PROCEDURE — 1036F TOBACCO NON-USER: CPT | Performed by: NURSE PRACTITIONER

## 2019-08-09 PROCEDURE — 99213 OFFICE O/P EST LOW 20 MIN: CPT | Performed by: NURSE PRACTITIONER

## 2019-08-09 PROCEDURE — G8417 CALC BMI ABV UP PARAM F/U: HCPCS | Performed by: NURSE PRACTITIONER

## 2019-09-12 ENCOUNTER — TELEPHONE (OUTPATIENT)
Dept: FAMILY MEDICINE CLINIC | Age: 59
End: 2019-09-12

## 2019-09-12 NOTE — TELEPHONE ENCOUNTER
Patient is anticipating having to have surgical intervention on bilateral shoulders. He has found a physician in [facility] that does shoulder resurfacing rather than joint replacements. Provider is in network but if our office will complete a referral, they will pay better on the patient's behalf. The patient is not scheduled yet but anticipates surgery and several visits. Requesting that we call his insurance care coordinator at [phone]. The physician's name is Dr. Jason Brown and his phone number is [phone]. May need to contact him first to get specific codes for office visits/consultation, pre-surgery H&P appt, and post op appt. Patient knows that this may take several days and is ok with it, would just like a callback once approval received.

## 2019-09-20 ENCOUNTER — TELEPHONE (OUTPATIENT)
Dept: UROLOGY | Age: 59
End: 2019-09-20

## 2019-09-20 DIAGNOSIS — E34.9 TESTOSTERONE DEFICIENCY: Primary | ICD-10-CM

## 2019-10-02 LAB
HCT VFR BLD CALC: 40.3 % (ref 39–49)
HEMOGLOBIN: 14.2 G/DL (ref 13.1–17.3)
PSA, ULTRASENSITIVE: 0.63 NG/ML (ref 0–4)
TESTOSTERONE TOTAL: 3.43 NG/ML (ref 1.68–7.46)

## 2019-10-02 NOTE — TELEPHONE ENCOUNTER
The patient had the PSA was 0.63 yesterday. It was 0.22 on 08/06/2019. He had a colonoscopy completed on in the middle of September. Should he be concerned about this?

## 2019-10-17 ENCOUNTER — OFFICE VISIT (OUTPATIENT)
Dept: UROLOGY | Age: 59
End: 2019-10-17
Payer: COMMERCIAL

## 2019-10-17 VITALS
WEIGHT: 206 LBS | SYSTOLIC BLOOD PRESSURE: 134 MMHG | HEIGHT: 69 IN | DIASTOLIC BLOOD PRESSURE: 82 MMHG | BODY MASS INDEX: 30.51 KG/M2

## 2019-10-17 DIAGNOSIS — E34.9 TESTOSTERONE DEFICIENCY: Primary | ICD-10-CM

## 2019-10-17 DIAGNOSIS — R39.89 ABNORMAL PROSTATE EXAM: ICD-10-CM

## 2019-10-17 LAB
BILIRUBIN URINE: NEGATIVE
BLOOD URINE, POC: NEGATIVE
CHARACTER, URINE: CLEAR
COLOR, URINE: YELLOW
GLUCOSE URINE: NEGATIVE MG/DL
KETONES, URINE: NEGATIVE
LEUKOCYTE CLUMPS, URINE: NEGATIVE
NITRITE, URINE: NEGATIVE
PH, URINE: 6.5 (ref 5–9)
PROTEIN, URINE: NEGATIVE MG/DL
SPECIFIC GRAVITY, URINE: 1.01 (ref 1–1.03)
UROBILINOGEN, URINE: 0.2 EU/DL (ref 0–1)

## 2019-10-17 PROCEDURE — 1036F TOBACCO NON-USER: CPT | Performed by: UROLOGY

## 2019-10-17 PROCEDURE — 81003 URINALYSIS AUTO W/O SCOPE: CPT | Performed by: UROLOGY

## 2019-10-17 PROCEDURE — 99212 OFFICE O/P EST SF 10 MIN: CPT | Performed by: UROLOGY

## 2019-10-17 PROCEDURE — G8484 FLU IMMUNIZE NO ADMIN: HCPCS | Performed by: UROLOGY

## 2019-10-17 PROCEDURE — G8417 CALC BMI ABV UP PARAM F/U: HCPCS | Performed by: UROLOGY

## 2019-10-17 PROCEDURE — G8427 DOCREV CUR MEDS BY ELIG CLIN: HCPCS | Performed by: UROLOGY

## 2019-10-17 PROCEDURE — 3017F COLORECTAL CA SCREEN DOC REV: CPT | Performed by: UROLOGY

## 2019-10-17 ASSESSMENT — ENCOUNTER SYMPTOMS
APNEA: 0
ABDOMINAL PAIN: 0
EYE PAIN: 0
CONSTIPATION: 0
BACK PAIN: 0
EYE DISCHARGE: 0
COUGH: 0

## 2019-10-23 ENCOUNTER — TELEPHONE (OUTPATIENT)
Dept: FAMILY MEDICINE CLINIC | Age: 59
End: 2019-10-23

## 2019-10-23 DIAGNOSIS — Z01.818 PRE-OP TESTING: Primary | ICD-10-CM

## 2019-10-24 ENCOUNTER — HOSPITAL ENCOUNTER (OUTPATIENT)
Age: 59
Discharge: HOME | End: 2019-10-24
Payer: COMMERCIAL

## 2019-10-24 ENCOUNTER — HOSPITAL ENCOUNTER (OUTPATIENT)
Dept: GENERAL RADIOLOGY | Age: 59
Discharge: HOME | End: 2019-10-24
Payer: COMMERCIAL

## 2019-10-24 ENCOUNTER — NURSE ONLY (OUTPATIENT)
Dept: FAMILY MEDICINE CLINIC | Age: 59
End: 2019-10-24

## 2019-10-24 DIAGNOSIS — Z01.818 PRE-OP TESTING: ICD-10-CM

## 2019-10-24 LAB
ANION GAP SERPL CALCULATED.3IONS-SCNC: 22 MEQ/L (ref 8–16)
BASOPHILS # BLD: 1 %
BASOPHILS ABSOLUTE: 0 THOU/MM3 (ref 0–0.1)
BUN BLDV-MCNC: 18 MG/DL (ref 7–22)
CALCIUM SERPL-MCNC: 9.6 MG/DL (ref 8.5–10.5)
CHLORIDE BLD-SCNC: 96 MEQ/L (ref 98–111)
CO2: 28 MEQ/L (ref 23–33)
CREAT SERPL-MCNC: 1 MG/DL (ref 0.4–1.2)
EKG ATRIAL RATE: 58 BPM
EKG P AXIS: 37 DEGREES
EKG P-R INTERVAL: 168 MS
EKG Q-T INTERVAL: 442 MS
EKG QRS DURATION: 98 MS
EKG QTC CALCULATION (BAZETT): 433 MS
EKG R AXIS: 28 DEGREES
EKG T AXIS: 55 DEGREES
EKG VENTRICULAR RATE: 58 BPM
EOSINOPHIL # BLD: 2.9 %
EOSINOPHILS ABSOLUTE: 0.1 THOU/MM3 (ref 0–0.4)
ERYTHROCYTE [DISTWIDTH] IN BLOOD BY AUTOMATED COUNT: 13.1 % (ref 11.5–14.5)
ERYTHROCYTE [DISTWIDTH] IN BLOOD BY AUTOMATED COUNT: 43.3 FL (ref 35–45)
GFR SERPL CREATININE-BSD FRML MDRD: 76 ML/MIN/1.73M2
GLUCOSE BLD-MCNC: 122 MG/DL (ref 70–108)
HCT VFR BLD CALC: 41.7 % (ref 42–52)
HEMOGLOBIN: 13.6 GM/DL (ref 14–18)
IMMATURE GRANS (ABS): 0.02 THOU/MM3 (ref 0–0.07)
IMMATURE GRANULOCYTES: 0.5 %
LYMPHOCYTES # BLD: 29.7 %
LYMPHOCYTES ABSOLUTE: 1.2 THOU/MM3 (ref 1–4.8)
MCH RBC QN AUTO: 29.8 PG (ref 26–33)
MCHC RBC AUTO-ENTMCNC: 32.6 GM/DL (ref 32.2–35.5)
MCV RBC AUTO: 91.2 FL (ref 80–94)
MONOCYTES # BLD: 8.4 %
MONOCYTES ABSOLUTE: 0.4 THOU/MM3 (ref 0.4–1.3)
NUCLEATED RED BLOOD CELLS: 0 /100 WBC
PLATELET # BLD: 317 THOU/MM3 (ref 130–400)
PMV BLD AUTO: 9.9 FL (ref 9.4–12.4)
POTASSIUM SERPL-SCNC: 4.6 MEQ/L (ref 3.5–5.2)
RBC # BLD: 4.57 MILL/MM3 (ref 4.7–6.1)
SEG NEUTROPHILS: 57.5 %
SEGMENTED NEUTROPHILS ABSOLUTE COUNT: 2.4 THOU/MM3 (ref 1.8–7.7)
SODIUM BLD-SCNC: 146 MEQ/L (ref 135–145)
WBC # BLD: 4.2 THOU/MM3 (ref 4.8–10.8)

## 2019-10-24 PROCEDURE — 71046 X-RAY EXAM CHEST 2 VIEWS: CPT

## 2019-10-24 PROCEDURE — 93005 ELECTROCARDIOGRAM TRACING: CPT

## 2019-10-29 ENCOUNTER — OFFICE VISIT (OUTPATIENT)
Dept: FAMILY MEDICINE CLINIC | Age: 59
End: 2019-10-29
Payer: COMMERCIAL

## 2019-10-29 VITALS
BODY MASS INDEX: 30.46 KG/M2 | DIASTOLIC BLOOD PRESSURE: 84 MMHG | RESPIRATION RATE: 16 BRPM | SYSTOLIC BLOOD PRESSURE: 120 MMHG | HEART RATE: 72 BPM | TEMPERATURE: 97.5 F | WEIGHT: 206.25 LBS

## 2019-10-29 DIAGNOSIS — R73.01 IFG (IMPAIRED FASTING GLUCOSE): ICD-10-CM

## 2019-10-29 DIAGNOSIS — E78.1 HYPERTRIGLYCERIDEMIA: ICD-10-CM

## 2019-10-29 DIAGNOSIS — I10 ESSENTIAL HYPERTENSION: ICD-10-CM

## 2019-10-29 DIAGNOSIS — Z01.818 PRE-OP EVALUATION: Primary | ICD-10-CM

## 2019-10-29 PROCEDURE — 99214 OFFICE O/P EST MOD 30 MIN: CPT | Performed by: NURSE PRACTITIONER

## 2019-10-29 PROCEDURE — 1036F TOBACCO NON-USER: CPT | Performed by: NURSE PRACTITIONER

## 2019-10-29 PROCEDURE — 3017F COLORECTAL CA SCREEN DOC REV: CPT | Performed by: NURSE PRACTITIONER

## 2019-10-29 PROCEDURE — G8427 DOCREV CUR MEDS BY ELIG CLIN: HCPCS | Performed by: NURSE PRACTITIONER

## 2019-10-29 PROCEDURE — G8417 CALC BMI ABV UP PARAM F/U: HCPCS | Performed by: NURSE PRACTITIONER

## 2019-10-29 PROCEDURE — G8482 FLU IMMUNIZE ORDER/ADMIN: HCPCS | Performed by: NURSE PRACTITIONER

## 2019-10-29 ASSESSMENT — ENCOUNTER SYMPTOMS
SINUS PAIN: 0
SHORTNESS OF BREATH: 0
COLOR CHANGE: 0
EYE PAIN: 0
FACIAL SWELLING: 0
ABDOMINAL PAIN: 0
WHEEZING: 0
DIARRHEA: 0
NAUSEA: 0
TROUBLE SWALLOWING: 0
BACK PAIN: 0
COUGH: 0
VOMITING: 0
SORE THROAT: 0

## 2019-11-07 ENCOUNTER — OFFICE VISIT (OUTPATIENT)
Dept: FAMILY MEDICINE CLINIC | Age: 59
End: 2019-11-07
Payer: COMMERCIAL

## 2019-11-07 VITALS
HEART RATE: 72 BPM | BODY MASS INDEX: 30.86 KG/M2 | TEMPERATURE: 97.7 F | RESPIRATION RATE: 18 BRPM | SYSTOLIC BLOOD PRESSURE: 122 MMHG | WEIGHT: 209 LBS | DIASTOLIC BLOOD PRESSURE: 78 MMHG

## 2019-11-07 DIAGNOSIS — J40 BRONCHITIS: Primary | ICD-10-CM

## 2019-11-07 DIAGNOSIS — R05.9 COUGH: ICD-10-CM

## 2019-11-07 DIAGNOSIS — E34.9 TESTOSTERONE DEFICIENCY: ICD-10-CM

## 2019-11-07 PROCEDURE — G8482 FLU IMMUNIZE ORDER/ADMIN: HCPCS | Performed by: NURSE PRACTITIONER

## 2019-11-07 PROCEDURE — G8417 CALC BMI ABV UP PARAM F/U: HCPCS | Performed by: NURSE PRACTITIONER

## 2019-11-07 PROCEDURE — 3017F COLORECTAL CA SCREEN DOC REV: CPT | Performed by: NURSE PRACTITIONER

## 2019-11-07 PROCEDURE — G8427 DOCREV CUR MEDS BY ELIG CLIN: HCPCS | Performed by: NURSE PRACTITIONER

## 2019-11-07 PROCEDURE — 1036F TOBACCO NON-USER: CPT | Performed by: NURSE PRACTITIONER

## 2019-11-07 PROCEDURE — 99213 OFFICE O/P EST LOW 20 MIN: CPT | Performed by: NURSE PRACTITIONER

## 2019-11-07 RX ORDER — AZITHROMYCIN 250 MG/1
TABLET, FILM COATED ORAL
Qty: 6 TABLET | Refills: 0 | Status: SHIPPED | OUTPATIENT
Start: 2019-11-07 | End: 2019-11-17

## 2019-11-07 RX ORDER — TESTOSTERONE 16.2 MG/G
GEL TRANSDERMAL
Qty: 150 G | Refills: 5 | Status: SHIPPED | OUTPATIENT
Start: 2019-11-07 | End: 2020-01-21 | Stop reason: SDUPTHER

## 2019-11-07 RX ORDER — BENZONATATE 100 MG/1
100 CAPSULE ORAL 3 TIMES DAILY PRN
Qty: 30 CAPSULE | Refills: 0 | Status: SHIPPED | OUTPATIENT
Start: 2019-11-07 | End: 2019-11-17

## 2019-11-07 ASSESSMENT — ENCOUNTER SYMPTOMS
HEMOPTYSIS: 0
COUGH: 1
RHINORRHEA: 1
WHEEZING: 0
TROUBLE SWALLOWING: 0
SHORTNESS OF BREATH: 0
SORE THROAT: 1

## 2019-11-11 ENCOUNTER — TELEPHONE (OUTPATIENT)
Dept: FAMILY MEDICINE CLINIC | Age: 59
End: 2019-11-11

## 2019-11-11 RX ORDER — CEFDINIR 300 MG/1
300 CAPSULE ORAL 2 TIMES DAILY
Qty: 20 CAPSULE | Refills: 0 | Status: SHIPPED | OUTPATIENT
Start: 2019-11-11 | End: 2019-11-21

## 2019-12-02 ENCOUNTER — TELEPHONE (OUTPATIENT)
Dept: FAMILY MEDICINE CLINIC | Age: 59
End: 2019-12-02

## 2019-12-02 DIAGNOSIS — Z01.812 ENCOUNTER FOR PRE-OPERATIVE LABORATORY TESTING: Primary | ICD-10-CM

## 2019-12-04 ENCOUNTER — NURSE ONLY (OUTPATIENT)
Dept: LAB | Age: 59
End: 2019-12-04

## 2019-12-04 DIAGNOSIS — Z01.812 ENCOUNTER FOR PRE-OPERATIVE LABORATORY TESTING: ICD-10-CM

## 2019-12-04 LAB
ANION GAP SERPL CALCULATED.3IONS-SCNC: 15 MEQ/L (ref 8–16)
BASOPHILS # BLD: 1.4 %
BASOPHILS ABSOLUTE: 0.1 THOU/MM3 (ref 0–0.1)
BUN BLDV-MCNC: 19 MG/DL (ref 7–22)
CALCIUM SERPL-MCNC: 9.8 MG/DL (ref 8.5–10.5)
CHLORIDE BLD-SCNC: 94 MEQ/L (ref 98–111)
CO2: 24 MEQ/L (ref 23–33)
CREAT SERPL-MCNC: 0.9 MG/DL (ref 0.4–1.2)
EOSINOPHIL # BLD: 6 %
EOSINOPHILS ABSOLUTE: 0.4 THOU/MM3 (ref 0–0.4)
ERYTHROCYTE [DISTWIDTH] IN BLOOD BY AUTOMATED COUNT: 12.9 % (ref 11.5–14.5)
ERYTHROCYTE [DISTWIDTH] IN BLOOD BY AUTOMATED COUNT: 43.3 FL (ref 35–45)
GFR SERPL CREATININE-BSD FRML MDRD: 86 ML/MIN/1.73M2
GLUCOSE BLD-MCNC: 125 MG/DL (ref 70–108)
HCT VFR BLD CALC: 39.7 % (ref 42–52)
HEMOGLOBIN: 13 GM/DL (ref 14–18)
IMMATURE GRANS (ABS): 0.04 THOU/MM3 (ref 0–0.07)
IMMATURE GRANULOCYTES: 0.5 %
LYMPHOCYTES # BLD: 22.1 %
LYMPHOCYTES ABSOLUTE: 1.6 THOU/MM3 (ref 1–4.8)
MCH RBC QN AUTO: 30 PG (ref 26–33)
MCHC RBC AUTO-ENTMCNC: 32.7 GM/DL (ref 32.2–35.5)
MCV RBC AUTO: 91.5 FL (ref 80–94)
MONOCYTES # BLD: 5.7 %
MONOCYTES ABSOLUTE: 0.4 THOU/MM3 (ref 0.4–1.3)
NUCLEATED RED BLOOD CELLS: 0 /100 WBC
PLATELET # BLD: 413 THOU/MM3 (ref 130–400)
PMV BLD AUTO: 9.6 FL (ref 9.4–12.4)
POTASSIUM SERPL-SCNC: 4.5 MEQ/L (ref 3.5–5.2)
RBC # BLD: 4.34 MILL/MM3 (ref 4.7–6.1)
SEG NEUTROPHILS: 64.3 %
SEGMENTED NEUTROPHILS ABSOLUTE COUNT: 4.8 THOU/MM3 (ref 1.8–7.7)
SODIUM BLD-SCNC: 133 MEQ/L (ref 135–145)
WBC # BLD: 7.4 THOU/MM3 (ref 4.8–10.8)

## 2019-12-09 ENCOUNTER — OFFICE VISIT (OUTPATIENT)
Dept: FAMILY MEDICINE CLINIC | Age: 59
End: 2019-12-09
Payer: COMMERCIAL

## 2019-12-09 VITALS
DIASTOLIC BLOOD PRESSURE: 70 MMHG | TEMPERATURE: 97.9 F | SYSTOLIC BLOOD PRESSURE: 114 MMHG | HEART RATE: 72 BPM | BODY MASS INDEX: 29.68 KG/M2 | RESPIRATION RATE: 14 BRPM | WEIGHT: 201 LBS

## 2019-12-09 DIAGNOSIS — Z01.818 PRE-OP EXAMINATION: Primary | ICD-10-CM

## 2019-12-09 DIAGNOSIS — E78.1 HYPERTRIGLYCERIDEMIA: ICD-10-CM

## 2019-12-09 DIAGNOSIS — R73.01 IFG (IMPAIRED FASTING GLUCOSE): ICD-10-CM

## 2019-12-09 DIAGNOSIS — I10 ESSENTIAL HYPERTENSION: ICD-10-CM

## 2019-12-09 PROCEDURE — G8417 CALC BMI ABV UP PARAM F/U: HCPCS | Performed by: NURSE PRACTITIONER

## 2019-12-09 PROCEDURE — 1036F TOBACCO NON-USER: CPT | Performed by: NURSE PRACTITIONER

## 2019-12-09 PROCEDURE — G8427 DOCREV CUR MEDS BY ELIG CLIN: HCPCS | Performed by: NURSE PRACTITIONER

## 2019-12-09 PROCEDURE — 99214 OFFICE O/P EST MOD 30 MIN: CPT | Performed by: NURSE PRACTITIONER

## 2019-12-09 PROCEDURE — G8482 FLU IMMUNIZE ORDER/ADMIN: HCPCS | Performed by: NURSE PRACTITIONER

## 2019-12-09 PROCEDURE — 3017F COLORECTAL CA SCREEN DOC REV: CPT | Performed by: NURSE PRACTITIONER

## 2019-12-09 ASSESSMENT — ENCOUNTER SYMPTOMS
BACK PAIN: 0
NAUSEA: 0
SORE THROAT: 0
FACIAL SWELLING: 0
SHORTNESS OF BREATH: 0
SINUS PAIN: 0
TROUBLE SWALLOWING: 0
WHEEZING: 0
COLOR CHANGE: 0
VOMITING: 0
ABDOMINAL PAIN: 0
EYE PAIN: 0
COUGH: 0
DIARRHEA: 0
BLOOD IN STOOL: 0

## 2020-01-20 NOTE — TELEPHONE ENCOUNTER
Greg Shore called requesting a refill on the following medications:  Requested Prescriptions     Pending Prescriptions Disp Refills    Testosterone (ANDROGEL) 20.25 MG/ACT (1.62%) GEL gel 150 g 5     Sig: [address] [facility] verified: Jules wade      Date of last visit: 10/17/2019  Date of next visit (if applicable): Visit date not found

## 2020-01-21 RX ORDER — TESTOSTERONE 16.2 MG/G
GEL TRANSDERMAL
Qty: 150 G | Refills: 5 | Status: SHIPPED | OUTPATIENT
Start: 2020-01-21 | End: 2020-03-24 | Stop reason: SDUPTHER

## 2020-03-24 RX ORDER — TESTOSTERONE 16.2 MG/G
GEL TRANSDERMAL
Qty: 150 G | Refills: 5 | Status: SHIPPED | OUTPATIENT
Start: 2020-03-24 | End: 2020-09-24 | Stop reason: SDUPTHER

## 2020-03-24 NOTE — TELEPHONE ENCOUNTER
Zena Louise called requesting a refill on the following medications:  Requested Prescriptions     Pending Prescriptions Disp Refills    Testosterone (ANDROGEL) 20.25 MG/ACT (1.62%) GEL gel 150 g 5     [facility]    PHARMACY TOLD PT HE WAS OUT OF REFILLS    Date of last visit: 10/17/2019  Date of next visit (if applicable): 2/83/9211

## 2020-05-18 ENCOUNTER — TELEPHONE (OUTPATIENT)
Dept: FAMILY MEDICINE CLINIC | Age: 60
End: 2020-05-18

## 2020-05-18 NOTE — TELEPHONE ENCOUNTER
Han has an appt for phys with Rebeca Sparks in [place] 06-08, he is requesting an order for blood work be mailed to him so he can go over the results at his appt. [place] address is  [address] [address], [address] [place] [address].

## 2020-05-19 NOTE — TELEPHONE ENCOUNTER
PSA just done by urology 10/19. Ok to order, but let him know that his insurance may not cover it since it hasn't been a year since his last one.  Cg

## 2020-05-27 ENCOUNTER — NURSE ONLY (OUTPATIENT)
Dept: LAB | Age: 60
End: 2020-05-27

## 2020-05-27 LAB
ALBUMIN SERPL-MCNC: 4.6 G/DL (ref 3.5–5.1)
ALP BLD-CCNC: 72 U/L (ref 38–126)
ALT SERPL-CCNC: 24 U/L (ref 11–66)
ANION GAP SERPL CALCULATED.3IONS-SCNC: 10 MEQ/L (ref 8–16)
AST SERPL-CCNC: 26 U/L (ref 5–40)
AVERAGE GLUCOSE: 111 MG/DL (ref 70–126)
BILIRUB SERPL-MCNC: 0.3 MG/DL (ref 0.3–1.2)
BUN BLDV-MCNC: 18 MG/DL (ref 7–22)
CALCIUM SERPL-MCNC: 9.8 MG/DL (ref 8.5–10.5)
CHLORIDE BLD-SCNC: 95 MEQ/L (ref 98–111)
CHOLESTEROL, TOTAL: 171 MG/DL (ref 100–199)
CO2: 27 MEQ/L (ref 23–33)
CREAT SERPL-MCNC: 1.1 MG/DL (ref 0.4–1.2)
GFR SERPL CREATININE-BSD FRML MDRD: 68 ML/MIN/1.73M2
GLUCOSE BLD-MCNC: 126 MG/DL (ref 70–108)
HBA1C MFR BLD: 5.7 % (ref 4.4–6.4)
HDLC SERPL-MCNC: 31 MG/DL
LDL CHOLESTEROL CALCULATED: 78 MG/DL
POTASSIUM SERPL-SCNC: 4.7 MEQ/L (ref 3.5–5.2)
SODIUM BLD-SCNC: 132 MEQ/L (ref 135–145)
TOTAL PROTEIN: 7.4 G/DL (ref 6.1–8)
TRIGL SERPL-MCNC: 309 MG/DL (ref 0–199)

## 2020-06-08 ENCOUNTER — OFFICE VISIT (OUTPATIENT)
Dept: FAMILY MEDICINE CLINIC | Age: 60
End: 2020-06-08
Payer: COMMERCIAL

## 2020-06-08 VITALS
HEART RATE: 80 BPM | DIASTOLIC BLOOD PRESSURE: 78 MMHG | BODY MASS INDEX: 28.35 KG/M2 | WEIGHT: 198 LBS | RESPIRATION RATE: 16 BRPM | SYSTOLIC BLOOD PRESSURE: 122 MMHG | TEMPERATURE: 98.3 F | HEIGHT: 70 IN

## 2020-06-08 PROCEDURE — 99396 PREV VISIT EST AGE 40-64: CPT | Performed by: FAMILY MEDICINE

## 2020-06-08 RX ORDER — ESOMEPRAZOLE MAGNESIUM 40 MG/1
40 CAPSULE, DELAYED RELEASE ORAL
Qty: 30 CAPSULE | Refills: 11 | Status: SHIPPED | OUTPATIENT
Start: 2020-06-08 | End: 2021-06-02 | Stop reason: SDUPTHER

## 2020-06-08 RX ORDER — VALACYCLOVIR HYDROCHLORIDE 500 MG/1
500 TABLET, FILM COATED ORAL DAILY
Qty: 30 TABLET | Refills: 11 | Status: SHIPPED | OUTPATIENT
Start: 2020-06-08 | End: 2021-06-02 | Stop reason: SDUPTHER

## 2020-06-08 RX ORDER — VALSARTAN AND HYDROCHLOROTHIAZIDE 320; 25 MG/1; MG/1
TABLET, FILM COATED ORAL
Qty: 30 TABLET | Refills: 11 | Status: SHIPPED | OUTPATIENT
Start: 2020-06-08 | End: 2020-06-23 | Stop reason: DRUGHIGH

## 2020-06-08 RX ORDER — METOPROLOL SUCCINATE 100 MG/1
100 TABLET, EXTENDED RELEASE ORAL DAILY
Qty: 30 TABLET | Refills: 11 | Status: SHIPPED | OUTPATIENT
Start: 2020-06-08 | End: 2021-06-02 | Stop reason: SDUPTHER

## 2020-06-08 ASSESSMENT — ENCOUNTER SYMPTOMS
EYES NEGATIVE: 1
ABDOMINAL PAIN: 0
BLOOD IN STOOL: 0
SHORTNESS OF BREATH: 0
CHEST TIGHTNESS: 0

## 2020-06-08 NOTE — PATIENT INSTRUCTIONS
during a routine doctor visit. Your doctor will tell you how often to check your blood pressure based on your age, your blood pressure results, and other factors. · Prostate exam. Talk to your doctor about whether you should have a blood test (called a PSA test) for prostate cancer. Experts recommend that you discuss the benefits and risks of the test with your doctor before you decide whether to have this test.  · Diabetes. Ask your doctor whether you should have tests for diabetes. · Vision. Some experts recommend that you have yearly exams for glaucoma and other age-related eye problems starting at age 48. · Hearing. Tell your doctor if you notice any change in your hearing. You can have tests to find out how well you hear. · Colorectal cancer. Your risk for colorectal cancer gets higher as you get older. Some experts say that adults should start regular screening at age 48 and stop at age 76. Others say to start before age 48 or continue after age 76. Talk with your doctor about your risk and when to start and stop screening. · Heart attack and stroke risk. At least every 4 to 6 years, you should have your risk for heart attack and stroke assessed. Your doctor uses factors such as your age, blood pressure, cholesterol, and whether you smoke or have diabetes to show what your risk for a heart attack or stroke is over the next 10 years. · Abdominal aortic aneurysm. Ask your doctor whether you should have a test to check for an aneurysm. You may need a test if you ever smoked or if your parent, brother, sister, or child has had an aneurysm. When should you call for help? Watch closely for changes in your health, and be sure to contact your doctor if you have any problems or symptoms that concern you. Where can you learn more? Go to [email or web address] org and sign in to your [employer] account.  Enter E531 in the [facility] box to learn more about \"Well Visit, Men 48 to 72: Care
160

## 2020-06-08 NOTE — PROGRESS NOTES
well-developed. HENT:      Head: Normocephalic and atraumatic. Right Ear: Tympanic membrane normal.      Left Ear: Tympanic membrane normal.      Mouth/Throat:      Mouth: Mucous membranes are moist.      Pharynx: No posterior oropharyngeal erythema. Eyes:      Conjunctiva/sclera: Conjunctivae normal.   Neck:      Musculoskeletal: Neck supple. Thyroid: No thyromegaly. Vascular: No carotid bruit. Cardiovascular:      Rate and Rhythm: Normal rate and regular rhythm. Heart sounds: No murmur. Pulmonary:      Effort: Pulmonary effort is normal.      Breath sounds: Normal breath sounds. No wheezing. Abdominal:      General: Bowel sounds are normal.      Palpations: Abdomen is soft. Tenderness: There is no abdominal tenderness. Musculoskeletal:      Right lower leg: No edema. Left lower leg: No edema. Lymphadenopathy:      Cervical: No cervical adenopathy. Skin:     General: Skin is warm and dry. Findings: No rash. Neurological:      Mental Status: He is alert and oriented to person, place, and time.    Psychiatric:         Behavior: Behavior normal.       Component      Latest Ref Rng & Units 5/27/2020   Glucose      70 - 108 mg/dL 126 (H)   Creatinine      0.4 - 1.2 mg/dL 1.1   BUN      7 - 22 mg/dL 18   Sodium      135 - 145 meq/L 132 (L)   Potassium      3.5 - 5.2 meq/L 4.7   Chloride      98 - 111 meq/L 95 (L)   CO2      23 - 33 meq/L 27   Calcium      8.5 - 10.5 mg/dL 9.8   AST      5 - 40 U/L 26   Alk Phos      38 - 126 U/L 72   Total Protein      6.1 - 8.0 g/dL 7.4   Albumin      3.5 - 5.1 g/dL 4.6   Bilirubin      0.3 - 1.2 mg/dL 0.3   ALT      11 - 66 U/L 24   Cholesterol, Total      100 - 199 mg/dL 171   Triglycerides      0 - 199 mg/dL 309 (H)   HDL Cholesterol      mg/dL 31   LDL Calculated      mg/dL 78   Hemoglobin A1C      4.4 - 6.4 % 5.7   AVERAGE GLUCOSE      70 - 126 mg/dL 111   Anion Gap      8.0 - 16.0 meq/L 10.0   Est, Glom Filt Rate ml/min/1.73m2 68 (A)         Immunization History   Administered Date(s) Administered    Influenza Virus Vaccine 10/01/2013, 11/09/2015, 11/15/2016    Influenza Whole 11/01/2011    Influenza, Quadv, IM, PF (6 mo and older Fluzone, Flulaval, Fluarix, and 3 yrs and older Afluria) 11/09/2018, 10/25/2019    Tdap (Boostrix, Adacel) 08/13/2013         Health Maintenance   Topic Date Due    Shingles Vaccine (1 of 2) 12/30/2009    A1C test (Diabetic or Prediabetic)  05/27/2021    Potassium monitoring  05/27/2021    Creatinine monitoring  05/27/2021    DTaP/Tdap/Td vaccine (2 - Td) 08/13/2023    Lipid screen  05/27/2025    Colon cancer screen colonoscopy  09/20/2029    Flu vaccine  Completed    Hepatitis A vaccine  Aged Out    Hepatitis B vaccine  Aged Out    Hib vaccine  Aged Out    Meningococcal (ACWY) vaccine  Aged Out    Pneumococcal 0-64 years Vaccine  Aged Out    Hepatitis C screen  Discontinued    HIV screen  Discontinued         ASSESSMENT      1. Annual physical exam    2. Gastroesophageal reflux disease, esophagitis presence not specified    3. Essential hypertension    4. Herpes infection    5. Hyponatremia        PLAN        Orders Placed This Encounter   Procedures    Basic Metabolic Panel     Standing Status:   Future     Standing Expiration Date:   6/8/2021     Repeat BMP in 1-2 weeks due to mild hyponatremia    Med refills as below. No dose changes    Wellness form completed and faxed    Follow up with urology as planned.     Requested Prescriptions     Signed Prescriptions Disp Refills    esomeprazole (NEXIUM) 40 MG delayed release capsule 30 capsule 11     Sig: Take 1 capsule by mouth every morning (before breakfast)    valsartan-hydrochlorothiazide (DIOVAN-HCT) 320-25 MG per tablet 30 tablet 11     Sig: TAKE 1 TABLET BY MOUTH EVERY DAY    valACYclovir (VALTREX) 500 MG tablet 30 tablet 11     Sig: Take 1 tablet by mouth daily    metoprolol succinate (TOPROL XL) 100 MG extended

## 2020-06-11 ENCOUNTER — NURSE ONLY (OUTPATIENT)
Dept: LAB | Age: 60
End: 2020-06-11

## 2020-06-11 LAB
ANION GAP SERPL CALCULATED.3IONS-SCNC: 12 MEQ/L (ref 8–16)
BUN BLDV-MCNC: 13 MG/DL (ref 7–22)
CALCIUM SERPL-MCNC: 9.7 MG/DL (ref 8.5–10.5)
CHLORIDE BLD-SCNC: 95 MEQ/L (ref 98–111)
CO2: 25 MEQ/L (ref 23–33)
CREAT SERPL-MCNC: 1 MG/DL (ref 0.4–1.2)
GFR SERPL CREATININE-BSD FRML MDRD: 76 ML/MIN/1.73M2
GLUCOSE BLD-MCNC: 130 MG/DL (ref 70–108)
POTASSIUM SERPL-SCNC: 4.3 MEQ/L (ref 3.5–5.2)
SODIUM BLD-SCNC: 132 MEQ/L (ref 135–145)

## 2020-06-12 ENCOUNTER — TELEPHONE (OUTPATIENT)
Dept: FAMILY MEDICINE CLINIC | Age: 60
End: 2020-06-12

## 2020-06-12 RX ORDER — VALSARTAN 320 MG/1
320 TABLET ORAL DAILY
Qty: 90 TABLET | Refills: 0 | Status: SHIPPED | OUTPATIENT
Start: 2020-06-12 | End: 2020-06-16 | Stop reason: RX

## 2020-06-15 ENCOUNTER — TELEPHONE (OUTPATIENT)
Dept: FAMILY MEDICINE CLINIC | Age: 60
End: 2020-06-15

## 2020-06-15 NOTE — TELEPHONE ENCOUNTER
Pt called   He was changed from valsartan hct to just plain valsartan. He went to [facility] but they have not been able to get it in for 2-3 months, it has been out of stock. He called [facility] and was told the same thing.  Wondering what to do from here since pharmacies are having a hard time getting it in stock  [phone]

## 2020-06-15 NOTE — TELEPHONE ENCOUNTER
OK to change Valsartan to Olmesartan 40 mg- 1 pill daily. #30/5 refills or #90/1 refill. Keep us updated on BP's 2-3 weeks after change.   ES

## 2020-06-16 RX ORDER — OLMESARTAN MEDOXOMIL 40 MG/1
40 TABLET ORAL DAILY
Qty: 90 TABLET | Refills: 1 | Status: SHIPPED | OUTPATIENT
Start: 2020-06-16 | End: 2020-06-21 | Stop reason: ALTCHOICE

## 2020-06-17 RX ORDER — METOPROLOL SUCCINATE 100 MG/1
100 TABLET, EXTENDED RELEASE ORAL DAILY
Qty: 90 TABLET | Refills: 3 | OUTPATIENT
Start: 2020-06-17

## 2020-06-18 ENCOUNTER — TELEPHONE (OUTPATIENT)
Dept: FAMILY MEDICINE CLINIC | Age: 60
End: 2020-06-18

## 2020-06-18 NOTE — TELEPHONE ENCOUNTER
Would recommend he continue to monitor for symptoms, but would not recommend testing at this time. Symptoms should appear by day 14.  Would quarantine until the day 14 since exposure and then fine to return to work as normal. TS

## 2020-06-19 ENCOUNTER — TELEPHONE (OUTPATIENT)
Dept: FAMILY MEDICINE CLINIC | Age: 60
End: 2020-06-19

## 2020-06-19 NOTE — TELEPHONE ENCOUNTER
Check a couple local pharmacies and see if he can get valsartan 320mg there on a short term basis.   CG

## 2020-06-21 ENCOUNTER — HOSPITAL ENCOUNTER (EMERGENCY)
Age: 60
Discharge: HOME | End: 2020-06-21
Attending: EMERGENCY MEDICINE
Payer: COMMERCIAL

## 2020-06-21 VITALS
BODY MASS INDEX: 29.62 KG/M2 | HEIGHT: 69 IN | OXYGEN SATURATION: 100 % | HEART RATE: 65 BPM | WEIGHT: 200 LBS | DIASTOLIC BLOOD PRESSURE: 104 MMHG | TEMPERATURE: 98 F | SYSTOLIC BLOOD PRESSURE: 159 MMHG | RESPIRATION RATE: 18 BRPM

## 2020-06-21 LAB
EKG ATRIAL RATE: 65 BPM
EKG P AXIS: 57 DEGREES
EKG P-R INTERVAL: 184 MS
EKG Q-T INTERVAL: 400 MS
EKG QRS DURATION: 100 MS
EKG QTC CALCULATION (BAZETT): 416 MS
EKG R AXIS: 1 DEGREES
EKG T AXIS: 20 DEGREES
EKG VENTRICULAR RATE: 65 BPM

## 2020-06-21 PROCEDURE — 99283 EMERGENCY DEPT VISIT LOW MDM: CPT

## 2020-06-21 PROCEDURE — 93005 ELECTROCARDIOGRAM TRACING: CPT | Performed by: EMERGENCY MEDICINE

## 2020-06-21 PROCEDURE — 6370000000 HC RX 637 (ALT 250 FOR IP): Performed by: EMERGENCY MEDICINE

## 2020-06-21 RX ORDER — CLONIDINE HYDROCHLORIDE 0.1 MG/1
0.1 TABLET ORAL ONCE
Status: COMPLETED | OUTPATIENT
Start: 2020-06-21 | End: 2020-06-21

## 2020-06-21 RX ORDER — VALSARTAN 320 MG/1
320 TABLET ORAL DAILY
COMMUNITY
End: 2020-06-23

## 2020-06-21 RX ADMIN — CLONIDINE HYDROCHLORIDE 0.1 MG: 0.1 TABLET ORAL at 16:26

## 2020-06-21 ASSESSMENT — ENCOUNTER SYMPTOMS
COUGH: 0
ABDOMINAL PAIN: 0
VOMITING: 0
SHORTNESS OF BREATH: 0
NAUSEA: 0
SORE THROAT: 0
CONSTIPATION: 0
RHINORRHEA: 0
DIARRHEA: 0

## 2020-06-21 NOTE — ED PROVIDER NOTES
EMERGENCY DEPARTMENT ENCOUNTER          CHIEF COMPLAINT       Chief Complaint   Patient presents with    Hypertension       Nurses Notes reviewed and I agree except as noted in the HPI. HISTORY OF PRESENT ILLNESS    Richa Joseph is a 60 y.o. male who presents to the Emergency Department for the evaluation of hypertension. Patient states he had a physical 2 weeks ago and his sodium level was 132. Patient states he was taken off valsartan-hydrochlorothiazide and switched to olmesartan. He states two days ago he switched back to valsartan but without the hydrochlorothiazide. He denies chest pain or a headache. No further complaints at the time of the initial encounter. The HPI was provided by the patient. REVIEW OF SYSTEMS     Review of Systems   Constitutional: Negative for chills and fever. HENT: Negative for congestion, rhinorrhea and sore throat. Respiratory: Negative for cough and shortness of breath. Cardiovascular: Negative for chest pain. Gastrointestinal: Negative for abdominal pain, constipation, diarrhea, nausea and vomiting. Genitourinary: Negative for difficulty urinating. Musculoskeletal: Negative for arthralgias and myalgias. Skin: Negative for rash. Neurological: Negative for dizziness and syncope. PAST MEDICAL HISTORY    has a past medical history of Depression, Genital herpes, GERD (gastroesophageal reflux disease), Hematospermia, Hyperlipidemia, Hypertension, OCD (obsessive compulsive disorder), and UTI (urinary tract infection). SURGICAL HISTORY    has a past surgical history that includes Endoscopy, colon, diagnostic and shoulder surgery (Left, 11/18/2019).     CURRENT MEDICATIONS       Discharge Medication List as of 6/21/2020  5:25 PM      CONTINUE these medications which have NOT CHANGED    Details   valsartan (DIOVAN) 320 MG tablet Take 320 mg by mouth daily At nightHistorical Med      metoprolol succinate (TOPROL XL) 100 MG extended release tablet Take 1 tablet by mouth daily, Disp-30 tablet, R-11Normal      busPIRone (BUSPAR) 10 MG tablet Take 10 mg by mouth as needed Historical Med      esomeprazole (NEXIUM) 40 MG delayed release capsule Take 1 capsule by mouth every morning (before breakfast), Disp-30 capsule, R-11Normal      valACYclovir (VALTREX) 500 MG tablet Take 1 tablet by mouth daily, Disp-30 tablet, R-11Normal      Testosterone (ANDROGEL) 20.25 MG/ACT (1.62%) GEL gel PLACE 4 ACTUATIONS ONTO THE SKIN ONCE DAILY, Disp-150 g, R-5Normal      ibuprofen (ADVIL;MOTRIN) 200 MG tablet Take 200 mg by mouth every 6 hours as needed for PainHistorical Med      clomiPRAMINE (ANAFRANIL) 50 MG capsule 4 tabs at bedtime prn, Disp-120 capsule, R-5      aspirin 81 MG chewable tablet Take 81 mg by mouth nightly. fish oil-omega-3 fatty acids 1000 MG capsule Take 2 g by mouth daily. Multiple Vitamin (MULTIVITAMIN PO) Take  by mouth. ALLERGIES     has No Known Allergies. FAMILY HISTORY     He indicated that his mother is alive. He indicated that his father is . He indicated that his paternal grandfather is . family history includes Cancer in his father; Heart Attack in his paternal grandfather. SOCIAL HISTORY      reports that he has never smoked. He has never used smokeless tobacco. He reports that he does not drink alcohol. PHYSICAL EXAM     INITIAL VITALS:  height is 5' 9\" (1.753 m) and weight is 200 lb (90.7 kg). His oral temperature is 98 °F (36.7 °C). His blood pressure is 159/104 (abnormal) and his pulse is 65. His respiration is 18 and oxygen saturation is 100%. Physical Exam  Vitals signs and nursing note reviewed. Constitutional:       Appearance: He is well-developed. He is not toxic-appearing or diaphoretic. HENT:      Head: Normocephalic and atraumatic. Eyes:      Extraocular Movements: Extraocular movements intact.       Conjunctiva/sclera: Conjunctivae normal.   Neck:      Musculoskeletal: Normal range of motion and neck supple. Vascular: No JVD. Cardiovascular:      Rate and Rhythm: Normal rate and regular rhythm. Pulses: Normal pulses. Heart sounds: Normal heart sounds. No murmur. No friction rub. No gallop. Pulmonary:      Effort: Pulmonary effort is normal. No respiratory distress. Breath sounds: Normal breath sounds. No decreased breath sounds, wheezing, rhonchi or rales. Abdominal:      General: Bowel sounds are normal. There is no distension. Palpations: Abdomen is soft. Tenderness: There is no abdominal tenderness. There is no guarding or rebound. Musculoskeletal: Normal range of motion. Skin:     General: Skin is warm and dry. Findings: No rash. Neurological:      Mental Status: He is alert and oriented to person, place, and time. Motor: No abnormal muscle tone. Coordination: Coordination normal.         DIFFERENTIAL DIAGNOSIS:   Uncontrolled hypertension, hypertensive emergency vs urgency, medication side effect     DIAGNOSTIC RESULTS     EKG: All EKG's are interpreted by the Emergency Department Physician who either signs or Co-signs this chart in the absence of a cardiologist.    None    RADIOLOGY: non-plain film images(s) such as CT, Ultrasound and MRI are read by the radiologist.    None    LABS:   Labs Reviewed - No data to display     None    EMERGENCY DEPARTMENT COURSE:   Vitals:    Vitals:    06/21/20 1639 06/21/20 1654 06/21/20 1709 06/21/20 1723   BP: (!) 169/102 (!) 173/103 (!) 166/104 (!) 159/104   Pulse: 66 65 64 65   Resp: 15 16 20 18   Temp:       TempSrc:       SpO2: 100% 100% 100% 100%   Weight:       Height:           4:25 PM EDT: The patient was seen and evaluated. MDM:      CRITICAL CARE:   None     CONSULTS:  None    PROCEDURES:  None     FINAL IMPRESSION      1.  Elevated blood pressure reading          DISPOSITION/PLAN   Discharge     PATIENT REFERRED TO:  Mitesh Bowman MD  [address] [id number]  [phone]    Schedule an appointment as soon as possible for a visit in 1 day  RE-CHECK AND FURTHER TESTING AS NEEDED      DISCHARGE MEDICATIONS:  Discharge Medication List as of 6/21/2020  5:25 PM          (Please note that portions of this note were completed with a voice recognition program.  Efforts were made to edit the dictations but occasionally words are mis-transcribed.)    Scribe:  Chetan Valladares 6/21/20 4:25 PM EDT Scribing for and in the presence of Amie Castillo DO. Signed by: Kevin Koenig, 06/21/20 5:55 PM    Provider:  I personally performed the services described in the documentation, reviewed and edited the documentation which was dictated to the scribe in my presence, and it accurately records my words and actions.     Amie Castillo DO 6/21/20 5:55 PM        Amie Castillo DO  06/21/20 [id number]

## 2020-06-22 ENCOUNTER — OFFICE VISIT (OUTPATIENT)
Dept: FAMILY MEDICINE CLINIC | Age: 60
End: 2020-06-22
Payer: COMMERCIAL

## 2020-06-22 ENCOUNTER — TELEPHONE (OUTPATIENT)
Dept: FAMILY MEDICINE CLINIC | Age: 60
End: 2020-06-22

## 2020-06-22 VITALS
HEART RATE: 80 BPM | WEIGHT: 200 LBS | DIASTOLIC BLOOD PRESSURE: 92 MMHG | BODY MASS INDEX: 29.53 KG/M2 | RESPIRATION RATE: 16 BRPM | SYSTOLIC BLOOD PRESSURE: 150 MMHG | TEMPERATURE: 97.5 F

## 2020-06-22 LAB
ANION GAP SERPL CALCULATED.3IONS-SCNC: 17 MEQ/L (ref 8–16)
BUN BLDV-MCNC: 15 MG/DL (ref 7–22)
CALCIUM SERPL-MCNC: 9.1 MG/DL (ref 8.5–10.5)
CHLORIDE BLD-SCNC: 94 MEQ/L (ref 98–111)
CO2: 21 MEQ/L (ref 23–33)
CREAT SERPL-MCNC: 0.9 MG/DL (ref 0.4–1.2)
GFR SERPL CREATININE-BSD FRML MDRD: 86 ML/MIN/1.73M2
GLUCOSE BLD-MCNC: 109 MG/DL (ref 70–108)
POTASSIUM SERPL-SCNC: 4.6 MEQ/L (ref 3.5–5.2)
SODIUM BLD-SCNC: 132 MEQ/L (ref 135–145)

## 2020-06-22 PROCEDURE — 3017F COLORECTAL CA SCREEN DOC REV: CPT | Performed by: FAMILY MEDICINE

## 2020-06-22 PROCEDURE — 36415 COLL VENOUS BLD VENIPUNCTURE: CPT | Performed by: FAMILY MEDICINE

## 2020-06-22 PROCEDURE — 99213 OFFICE O/P EST LOW 20 MIN: CPT | Performed by: FAMILY MEDICINE

## 2020-06-22 PROCEDURE — G8417 CALC BMI ABV UP PARAM F/U: HCPCS | Performed by: FAMILY MEDICINE

## 2020-06-22 PROCEDURE — 1036F TOBACCO NON-USER: CPT | Performed by: FAMILY MEDICINE

## 2020-06-22 PROCEDURE — G8427 DOCREV CUR MEDS BY ELIG CLIN: HCPCS | Performed by: FAMILY MEDICINE

## 2020-06-22 ASSESSMENT — ENCOUNTER SYMPTOMS
COUGH: 0
SHORTNESS OF BREATH: 0

## 2020-06-23 ENCOUNTER — TELEPHONE (OUTPATIENT)
Dept: FAMILY MEDICINE CLINIC | Age: 60
End: 2020-06-23

## 2020-06-23 RX ORDER — VALSARTAN AND HYDROCHLOROTHIAZIDE 320; 25 MG/1; MG/1
1 TABLET, FILM COATED ORAL DAILY
COMMUNITY
End: 2021-06-02 | Stop reason: SDUPTHER

## 2020-06-23 RX ORDER — VALSARTAN AND HYDROCHLOROTHIAZIDE 320; 12.5 MG/1; MG/1
1 TABLET, FILM COATED ORAL DAILY
COMMUNITY
End: 2020-06-23 | Stop reason: CLARIF

## 2020-06-23 NOTE — TELEPHONE ENCOUNTER
----- Message from Amy Joseph MD sent at 6/23/2020  8:30 AM EDT -----  Ricky Ana that his sodium level is the same at 132. The diuretic is likely not the cause of his low sodium since his sodium has not increased with discontinuation of the HCTZ. [id number] Alena Holguin for him to go back to his Diovan HCT at previous dose for his BP. Call in BPs in 2 weeks. Recheck BMP in 2 months to monitor sodium.  CG

## 2020-08-07 ENCOUNTER — NURSE ONLY (OUTPATIENT)
Dept: LAB | Age: 60
End: 2020-08-07

## 2020-08-07 LAB
HCT VFR BLD CALC: 41.7 % (ref 42–52)
HEMOGLOBIN: 13.8 GM/DL (ref 14–18)
PROSTATE SPECIFIC ANTIGEN: 0.1 NG/ML

## 2020-08-09 LAB — TESTOSTERONE FREE: NORMAL

## 2020-08-10 ENCOUNTER — TELEPHONE (OUTPATIENT)
Dept: FAMILY MEDICINE CLINIC | Age: 60
End: 2020-08-10

## 2020-08-10 NOTE — TELEPHONE ENCOUNTER
Pt left message at 401pm stating his bp has been elevated, today was 155/102. Pt was started back on his original med and it is not helping, bp's typically 145-195. Needs appt? Med adjustment?   [phone]

## 2020-08-11 RX ORDER — AMLODIPINE BESYLATE 5 MG/1
5 TABLET ORAL DAILY
Qty: 30 TABLET | Refills: 1 | Status: SHIPPED | OUTPATIENT
Start: 2020-08-11 | End: 2020-08-11

## 2020-08-11 RX ORDER — AMLODIPINE BESYLATE 5 MG/1
5 TABLET ORAL DAILY
Qty: 90 TABLET | Refills: 0 | Status: SHIPPED | OUTPATIENT
Start: 2020-08-11 | End: 2020-11-05 | Stop reason: SDUPTHER

## 2020-08-11 NOTE — TELEPHONE ENCOUNTER
Pt started on med yesterday. Request back from pharmacy that pt is requesting #90. Ok for #90 instead of #30/1?   Order pending if ok

## 2020-08-13 ENCOUNTER — OFFICE VISIT (OUTPATIENT)
Dept: UROLOGY | Age: 60
End: 2020-08-13
Payer: COMMERCIAL

## 2020-08-13 ENCOUNTER — TELEPHONE (OUTPATIENT)
Dept: UROLOGY | Age: 60
End: 2020-08-13

## 2020-08-13 VITALS
BODY MASS INDEX: 29.51 KG/M2 | TEMPERATURE: 97.4 F | DIASTOLIC BLOOD PRESSURE: 80 MMHG | SYSTOLIC BLOOD PRESSURE: 120 MMHG | WEIGHT: 199.2 LBS | HEIGHT: 69 IN

## 2020-08-13 LAB
BILIRUBIN URINE: NEGATIVE
BLOOD URINE, POC: NEGATIVE
CHARACTER, URINE: CLEAR
COLOR, URINE: YELLOW
GLUCOSE URINE: NEGATIVE MG/DL
KETONES, URINE: NEGATIVE
LEUKOCYTE CLUMPS, URINE: NEGATIVE
NITRITE, URINE: NEGATIVE
PH, URINE: 6.5 (ref 5–9)
PROTEIN, URINE: NEGATIVE MG/DL
SPECIFIC GRAVITY, URINE: 1.02 (ref 1–1.03)
UROBILINOGEN, URINE: 0.2 EU/DL (ref 0–1)

## 2020-08-13 PROCEDURE — G8427 DOCREV CUR MEDS BY ELIG CLIN: HCPCS | Performed by: UROLOGY

## 2020-08-13 PROCEDURE — 99213 OFFICE O/P EST LOW 20 MIN: CPT | Performed by: UROLOGY

## 2020-08-13 PROCEDURE — 81003 URINALYSIS AUTO W/O SCOPE: CPT | Performed by: UROLOGY

## 2020-08-13 PROCEDURE — 3017F COLORECTAL CA SCREEN DOC REV: CPT | Performed by: UROLOGY

## 2020-08-13 PROCEDURE — G8417 CALC BMI ABV UP PARAM F/U: HCPCS | Performed by: UROLOGY

## 2020-08-13 PROCEDURE — 1036F TOBACCO NON-USER: CPT | Performed by: UROLOGY

## 2020-08-13 NOTE — TELEPHONE ENCOUNTER
I don't do PA for these. [facility] does them. If insurance needs anything from us they will contact us.  Thanks

## 2020-08-13 NOTE — PROGRESS NOTES
59-year-old white male with a history of hypogonadism. He is on AndroGel, 1.62%, 4 pumps on the skin daily  for TRT. Recent hemoglobin 13.8 g, hematocrit 41.7%, total testosterone 164 NG/DL, PSA 0.1 NG/mL. Patient has no voiding symptoms. He has a prior history of a abnormal DAVY. My exam in October, 2019 revealed a slight firmness at the 1 o'clock position at the lateral aspect of the prostate, similar to 2013. DAVY today: The small ridge at the 1 o'clock position is no longer palpable. The prostate is smooth and symmetric. UA today: WNL. Impression hypogonadism, not responding to testosterone gel. He states he is compliant with 4 pumps every single day. The naila testosterone we have documented is 122 NG/DL. The highest testosterone I saw was 301 NG/DL. I discussed other treatment options: Testosterone cypionate and testosterone subcutaneous pellets. I reviewed the advantages, disadvantages, cost.  He would like to see if his insurance will cover the subcutaneous pellets. In excess of 15 minutes was spent with the patient discussing their medical history, treatment outcomes and possible treatment related side effects. Greater than 50 per cent of this time was spent in face to face discussion of current disease status and ongoing management. We will start the process and see if his insurance covers this.

## 2020-08-13 NOTE — TELEPHONE ENCOUNTER
Shane Alberto can you check to see if this patient needs a prior authorization for Manohar Velazquez will be ordering this for the patient     Thank you

## 2020-08-17 ENCOUNTER — NURSE ONLY (OUTPATIENT)
Dept: LAB | Age: 60
End: 2020-08-17

## 2020-08-17 LAB
ANION GAP SERPL CALCULATED.3IONS-SCNC: 12 MEQ/L (ref 8–16)
BUN BLDV-MCNC: 18 MG/DL (ref 7–22)
CALCIUM SERPL-MCNC: 9.2 MG/DL (ref 8.5–10.5)
CHLORIDE BLD-SCNC: 95 MEQ/L (ref 98–111)
CO2: 27 MEQ/L (ref 23–33)
CREAT SERPL-MCNC: 0.9 MG/DL (ref 0.4–1.2)
GFR SERPL CREATININE-BSD FRML MDRD: 86 ML/MIN/1.73M2
GLUCOSE BLD-MCNC: 128 MG/DL (ref 70–108)
POTASSIUM SERPL-SCNC: 3.8 MEQ/L (ref 3.5–5.2)
SODIUM BLD-SCNC: 134 MEQ/L (ref 135–145)

## 2020-08-20 NOTE — TELEPHONE ENCOUNTER
Called Matthew and he was asking if we heard about the Testopel. I explained this was on backorder and as soon as it goes off backorder we will send in order for them to [facility] and then they will do the prior auth with insurance. Then they will call him. Pt verbalized understanding.

## 2020-08-31 NOTE — TELEPHONE ENCOUNTER
[facility] do you know if this was sent to [facility] and if not please send. Now that its off backorder. Thanks.

## 2020-09-15 NOTE — TELEPHONE ENCOUNTER
I have not received anything on this for PA. from [facility]. The only way they get back to us is if needs PA. If it doesn't they call the patient and set up copay and let them know at that point when they will ship to us. If the plan requires a PA they send me a paper requesting that. I called [facility] to check on status of order and they said they are working on it still as they have been swamped since Testopel came off backorder. They have not contacted insurance as of yet. I called patient and he said he is very disappointed in this and wanted to know if I could speed this up. I explained that they deal not only with our office but other offices and they said they are swamped and working on them. I gave him the number to [facility] and told him he is more than welcome to call them.

## 2020-09-21 NOTE — TELEPHONE ENCOUNTER
1847 Jeane Alarcon again in regards to the Testopel and per Delon Macias they are still working on it and she said she will send over to expedite since it has been since beginning of September. Katya Obrien notified of call.

## 2020-09-22 NOTE — TELEPHONE ENCOUNTER
I looked up on med mutual site to see if Testopel was going to need a PA since I have not heard anything yet from [facility] and it said it does. So form for [facility] filled out and sent with labs and office notes to [facility] / TAYLOR/ Jama De Jesus 19 at fax  [phone].

## 2020-09-24 RX ORDER — TESTOSTERONE 16.2 MG/G
GEL TRANSDERMAL
Qty: 150 G | Refills: 0 | Status: SHIPPED | OUTPATIENT
Start: 2020-09-24 | End: 2020-10-29 | Stop reason: SDUPTHER

## 2020-09-24 NOTE — TELEPHONE ENCOUNTER
Received paperwork from Urszula Espinal and it said PA had to be done through [employer]. I called [employer] to start the PA and they said Urszula needs to bill the medical because being a specialty drug they don't cover. Tried to call Urszula but was on hold 35 minutes. Will try again later today. Called Matthew and updated to what is going on also.

## 2020-09-24 NOTE — TELEPHONE ENCOUNTER
Patient is out of the androgel. He needs a new prescription sent to the pharmacy. He is still waiting to hear back about the testopel. Please advise. Thank you.

## 2020-09-24 NOTE — TELEPHONE ENCOUNTER
I finally got a hold of [employer] and they said they don't have down that patient has a major medical insurance. So I sent them copies of insurance card and they will bill them and see what happens. They also said they have tried to get a hold of patient with no luck.

## 2020-10-06 NOTE — TELEPHONE ENCOUNTER
Talked with a care coordinator from [place] [address] and they said everything was received and PA is now in the hands of the Nurse reviewers and soon as they make there decision they will let us know. Still pending at this time.

## 2020-10-08 NOTE — TELEPHONE ENCOUNTER
Dr Ann Alberts  Pt called and said he talked to his insurance and they said they would approve Testopel if its 6 pellets cause that is what CDC guidelines are. Not sure if this is true or not. Pt wants to know if it would be less effective only having 6 vs 10? And do you guys do 6 at times or is it only 10.

## 2020-10-09 ENCOUNTER — TELEPHONE (OUTPATIENT)
Dept: UROLOGY | Age: 60
End: 2020-10-09

## 2020-10-09 NOTE — TELEPHONE ENCOUNTER
Chad tSewart will get written documentation from the pharmaceutical company rep which you can then send to insurance company. Please inform the patient what is going on.

## 2020-10-12 ENCOUNTER — TELEPHONE (OUTPATIENT)
Dept: UROLOGY | Age: 60
End: 2020-10-12

## 2020-10-12 NOTE — TELEPHONE ENCOUNTER
Called and Spoke with [employer] will need to do an appeal letter since insurance is denying the 10 being covered. I reached out to Si are former Rep. He stated that when the insurance company does this its because they read on label that it only says 6 is allowed at once. We will need to submit a Letter of Medical Nessisity and proof as well that patients Testosterone is under 280. This could help the approval of 10 being covered.

## 2020-10-13 ENCOUNTER — TELEPHONE (OUTPATIENT)
Dept: UROLOGY | Age: 60
End: 2020-10-13

## 2020-10-13 NOTE — TELEPHONE ENCOUNTER
Let the patient know that the maximum number of pellets on the labeling by the FDA is 6 and that is what he will get. After the pellets are placed we will get a testosterone level at the appropriate time and see if it is therapeutic. If it is not then we can show the insurance company that he needs additional pellets.

## 2020-10-15 ENCOUNTER — TELEPHONE (OUTPATIENT)
Dept: UROLOGY | Age: 60
End: 2020-10-15

## 2020-10-15 NOTE — TELEPHONE ENCOUNTER
No one from insurance has returned my calls. So I called [employer] and gave verbal order for the 6 testopel pellets that insurance told patient they would approve if we dropped back from 10 to 6. Gretchen Deleon is going to resend to insurance to see if it goes through. I called Xuan Osorio to give update.

## 2020-10-15 NOTE — TELEPHONE ENCOUNTER
Pt called and gave me name and number of Rahul Ashervick from [employer] who is the nurse reviewer working on his PA for [employer]. I have called Loco Sean numerous times since last Friday in regards to Testopel and have left messages to call me back. He does not return my calls so I hit prompt for customer service for [employer] and talked with Rosales Bartlett who said she would try to get [employer]. She came back and said she left him a message also to call me back asap as he did not answer her call neither. I will continue to keep calling.

## 2020-10-15 NOTE — TELEPHONE ENCOUNTER
Tried again to customer service and talked with Anjali Bay who tried to get any of the nurse reviewers on the phone but no one would answer. Anjali Bay said he would send email out to each one to see if someone would return my call.

## 2020-10-22 NOTE — TELEPHONE ENCOUNTER
Received call from Constantino Brunner from [employer] who approved Testopel 6 pellets two times but each 6 must be 6 months apart. If needs to be sooner than 6 months we will have to call back and do a pier to pier of why it needs to be sooner. We will also need two morning levels again at least 24 hours apart before 10am before the next PA to show progress. Good from 10/22/20 - 4/22/21 Authorization #[id number]  [facility] [place] [place].  Per there request.

## 2020-10-28 ENCOUNTER — TELEPHONE (OUTPATIENT)
Dept: UROLOGY | Age: 60
End: 2020-10-28

## 2020-10-28 NOTE — TELEPHONE ENCOUNTER
Patient called wanting to talk with Lea Nolasco regarding testopel. I told him that Lea Nolasco was off today and that TAYLOR/ Jama De Jesus 19 called to speak with Lea Nolasco regarding Testopel. Patient informed that Lea Nolasco will call him after speaking with [employer] tomorrow.

## 2020-10-29 RX ORDER — TESTOSTERONE 16.2 MG/G
GEL TRANSDERMAL
Qty: 150 G | Refills: 2 | Status: SHIPPED | OUTPATIENT
Start: 2020-10-29 | End: 2020-12-31

## 2020-10-29 NOTE — TELEPHONE ENCOUNTER
Karine called and said this has to be buy and bill to be able to use the medical benefit and I needed to call Pharmacy benefit and get approved through them. I did this a month ago and was told by pharmacy to do through medical they don't handle it. Talked with Etelvina Finney  from [facility] and she said this is a medical claim. I told her that medical and pharmacy keep referring me to each other and we needed to get this figured out ASAP as patient has been waiting for months. It is approved through [employer],  pt's medical as of now. So [facility] Finney checked into it further with there clinical dept and they said yes they can do it through pharmacy so they are sending paperwork. She said if I get this back today and vicki urgent they can probably have done by Saturday morning. She apologized for all the confusion. Maia Dick (patient) updated.

## 2020-10-29 NOTE — TELEPHONE ENCOUNTER
Talked with Jonas Ash a [employer] reviewer ([phone]) that patient gave me name of that he has been talking with. She explained that if we can't buy and bill the testopel then we have to start over with pharmacy. Which is [facility]. I have already started proceedings with [facility].

## 2020-11-03 NOTE — TELEPHONE ENCOUNTER
[facility] denied the Testopel due to patient hasn't tried everything there is to try. He needs to try testosterone injections. I had sent in a paper stating patient has a fear of needles and mood swings and Dr Van Sharp signed it. I called [employer] and talked with clinical reviewer who said needle phobia is not enough unfortunately to get it overturned and nothing more that they can do. Called patient and he wondered why we can't buy and bill because his medical approved if we buy and bill. I explained to patient that we don't do buy and bill on this and we don't do our own billing. He asked to speak to my manager. Agnes given all the information and asked to call patient.

## 2020-11-03 NOTE — TELEPHONE ENCOUNTER
Karine called and said this has to be buy and bill to be able to use the medical benefit and I needed to call Pharmacy benefit and get approved through them. I did this a month ago and was told by pharmacy to do through medical they don't handle it. Talked with Herminio Bullard  from [facility] and she said this is a medical claim. I told her that medical and pharmacy keep referring me to each other and we needed to get this figured out ASAP as patient has been waiting for months. It is approved through [employer],  pt's medical as of now. So [facility] Bullard checked into it further with there clinical dept and they said yes they can do it through pharmacy so they are sending paperwork. She said if I get this back today and vicki urgent they can probably have done by Saturday morning. She apologized for all the confusion. Mikaela Loaiza (patient) updated. Me           1:32 PM   Note      New form filled out and faxed to [facility] with urgent review. Me           1:36 PM   Note      Talked with Maxx Carter a [employer] reviewer ([phone]) that patient gave me name of that he has been talking with. She explained that if we can't buy and bill the testopel then we have to start over with pharmacy. Which is [facility]. I have already started proceedings with [facility].

## 2020-11-03 NOTE — TELEPHONE ENCOUNTER
[facility] denied the Testopel due to patient hasn't tried everything there is to try. He needs to try testosterone injections. I had sent in a paper stating patient has a fear of needles and mood swings and Dr Jama Mary signed it. I called [employer] and talked with clinical reviewer who said needle phobia is not enough unfortunately to get it overturned and nothing more that they can do. Called patient and he wondered why we can't buy and bill because his medical approved if we buy and bill. I explained to patient that we don't do buy and bill on this and we don't do our own billing. He asked to speak to my manager. Agnes given all the information and asked to call patient.

## 2020-11-05 RX ORDER — AMLODIPINE BESYLATE 5 MG/1
5 TABLET ORAL DAILY
Qty: 90 TABLET | Refills: 3 | Status: SHIPPED | OUTPATIENT
Start: 2020-11-05 | End: 2021-06-02 | Stop reason: SDUPTHER

## 2020-11-05 NOTE — TELEPHONE ENCOUNTER
Rx sent to pharmacy as below:    Requested Prescriptions     Signed Prescriptions Disp Refills    amLODIPine (NORVASC) 5 MG tablet 90 tablet 3     Sig: Take 1 tablet by mouth daily     Authorizing Provider: Pilo Maradiaga           Electronically signed by Jeffrey Peralta MD on 11/5/2020 at 6:59 PM

## 2020-11-05 NOTE — TELEPHONE ENCOUNTER
Pt called to find out what is next step. I explained that there is a new under the tongue testosterone out now and he is interested. I explained that Dr Haritha Mckinney would like to research it and will let him know. Pt said he will sit tight to hear if this will work for him.

## 2020-11-05 NOTE — TELEPHONE ENCOUNTER
Pt left message at 219pm  Has 2 left of the new bp med he was started on back in July or august, needs to know if he needs to continue it, if so, needs rx sent in, he cannot remember name but it starts with an A  [phone]      Called pt  bp have been 120/82 was the highest. 110/77 typically. Do you want him to continue to take amlodipine?   If so, please send in refill (order pending)  No need to call pt back unless he is able to stop it.  [address]

## 2020-11-19 ENCOUNTER — TELEPHONE (OUTPATIENT)
Dept: UROLOGY | Age: 60
End: 2020-11-19

## 2020-11-19 NOTE — TELEPHONE ENCOUNTER
Checked and this buccal testosterone and it is 800 dollars a month. Per Dr Alena Reynoso we will start patient on sublingual testosterone from compounding pharmacy which is around 48 a month. Pt notified and Tam Duarte with this.  Called [facility] and canceled buccal testosterone

## 2020-11-19 NOTE — TELEPHONE ENCOUNTER
Pt called back to find out about the sublingual testosterone. Asked Dr Leanna Berrios and he said give him to the end of today to review. Pt said he has been waiting on answers since July. August 13 was his appointment which Testopel was discussed. He could not start though because it went on backorder  Then it came off backorder and order was sent to [facility]. It needed prior auth and insurance denied after a long ordeal. This whole process with backorder and insurance has been a couple months. He has been on Testosterone gel and still is at this time. Pt is very frustrated and wants answers.  Reported this to Ericka Hammonds and César Klein

## 2020-11-19 NOTE — TELEPHONE ENCOUNTER
Patient has had no success with his insurance covering testopel. He has a fear of needles so IM testosterone cypionate is not an option for self administration. I am sending an Rx for Striant buccal patches to his pharmacy. If this doesn't pass muster a sublingual tablet would be an alternative.

## 2020-11-20 ENCOUNTER — TELEPHONE (OUTPATIENT)
Dept: UROLOGY | Age: 60
End: 2020-11-20

## 2020-11-20 NOTE — TELEPHONE ENCOUNTER
----- Message from Carmen Tom LPN sent at 63/03/7578  4:42 PM EST -----  Regarding: Sublingual testosterone  Nathaly   Could you please start the order form for the sublingual testosterone from the compounding pharmacy.       Thank you  Scarlet Alarcon

## 2020-11-30 ENCOUNTER — TELEPHONE (OUTPATIENT)
Dept: UROLOGY | Age: 60
End: 2020-11-30

## 2020-11-30 NOTE — TELEPHONE ENCOUNTER
Patient called in regarding the new oral testosterone medication he was just recently prescribed by Dr. Jose Van. He had some questions about the follow up testosterone check, ,medication instructions, packaging shipment and educational pamphlets on the medication. He spoke with the pharmacist at [facility] who answer all his questions for him. He will give the  Medication a try to see if it helps. He is not opposed to getting testosterone injections if his PCP would administer the injections as he has a fear of needles. We briefly discussed again the buy and bill for testopel. Testopel is not covered by his insurance as he has not tried testosterone injections yet. If we go to testopel- we will need to figure out how to manage this since his insurance company will not approve unless he does injections first. Follow up appt made for a month with Dr. Jose Van. Testosterone lab ordered and mailed to patients home. He was advised to get this 3-5 days prior to his appt and to go in the morning. Pt verbalized understanding.

## 2020-12-23 ENCOUNTER — NURSE ONLY (OUTPATIENT)
Dept: LAB | Age: 60
End: 2020-12-23

## 2020-12-24 LAB — TESTOSTERONE TOTAL: 232 NG/DL (ref 300–720)

## 2020-12-31 ENCOUNTER — OFFICE VISIT (OUTPATIENT)
Dept: UROLOGY | Age: 60
End: 2020-12-31
Payer: COMMERCIAL

## 2020-12-31 ENCOUNTER — NURSE ONLY (OUTPATIENT)
Dept: UROLOGY | Age: 60
End: 2020-12-31
Payer: COMMERCIAL

## 2020-12-31 VITALS — BODY MASS INDEX: 31.67 KG/M2 | HEIGHT: 69 IN | TEMPERATURE: 97 F | WEIGHT: 213.8 LBS

## 2020-12-31 PROCEDURE — 1036F TOBACCO NON-USER: CPT | Performed by: UROLOGY

## 2020-12-31 PROCEDURE — G8484 FLU IMMUNIZE NO ADMIN: HCPCS | Performed by: UROLOGY

## 2020-12-31 PROCEDURE — G8417 CALC BMI ABV UP PARAM F/U: HCPCS | Performed by: UROLOGY

## 2020-12-31 PROCEDURE — 3017F COLORECTAL CA SCREEN DOC REV: CPT | Performed by: UROLOGY

## 2020-12-31 PROCEDURE — 96372 THER/PROPH/DIAG INJ SC/IM: CPT | Performed by: UROLOGY

## 2020-12-31 PROCEDURE — G8427 DOCREV CUR MEDS BY ELIG CLIN: HCPCS | Performed by: UROLOGY

## 2020-12-31 PROCEDURE — 99213 OFFICE O/P EST LOW 20 MIN: CPT | Performed by: UROLOGY

## 2020-12-31 RX ORDER — TESTOSTERONE CYPIONATE 200 MG/ML
200 VIAL (ML) INTRAMUSCULAR
Qty: 1 VIAL | Refills: 1 | Status: SHIPPED | OUTPATIENT
Start: 2020-12-31 | End: 2021-06-02

## 2020-12-31 RX ORDER — TESTOSTERONE CYPIONATE 200 MG/ML
200 INJECTION INTRAMUSCULAR ONCE
Status: COMPLETED | OUTPATIENT
Start: 2020-12-31 | End: 2020-12-31

## 2020-12-31 RX ADMIN — TESTOSTERONE CYPIONATE 200 MG: 200 INJECTION INTRAMUSCULAR at 11:16

## 2020-12-31 NOTE — PROGRESS NOTES
200mg/ml testosterone cypionate administered with 22 GA 1 inch needle. Patient has given me verbal consent to perform Testosterone Injection yes    Following Dr. Asiya Marc of care. Testosterone 200mg/ml GIVEN I.M Rt UOQ-pt given 1 mlof testosterone  Lot Number: [id number]  [address] #: [id number]    Patient supplied their own medications Yes    Advised pt may have some muscle soreness for next couple days. Pt will bring his vial back with him in 2 weeks for next injection. Called [facility] on [address] [place] [address] Drive, [phone] per Deanna Mccormack in Pharmacy the vial does not need to be refrigerated. Store between 60 and 77 degrees and do not store near bathroom due to fluctuations in temperature. Pt notified. He will store it in his bedroom on the dresser.

## 2021-01-14 ENCOUNTER — NURSE ONLY (OUTPATIENT)
Dept: UROLOGY | Age: 61
End: 2021-01-14
Payer: COMMERCIAL

## 2021-01-14 VITALS — TEMPERATURE: 96.6 F

## 2021-01-14 DIAGNOSIS — E29.1 HYPOGONADISM IN MALE: Primary | ICD-10-CM

## 2021-01-14 PROCEDURE — 96372 THER/PROPH/DIAG INJ SC/IM: CPT | Performed by: UROLOGY

## 2021-01-14 RX ORDER — TESTOSTERONE CYPIONATE 200 MG/ML
200 INJECTION INTRAMUSCULAR ONCE
Status: COMPLETED | OUTPATIENT
Start: 2021-01-14 | End: 2021-01-14

## 2021-01-14 RX ADMIN — TESTOSTERONE CYPIONATE 200 MG: 200 INJECTION INTRAMUSCULAR at 08:33

## 2021-01-28 ENCOUNTER — NURSE ONLY (OUTPATIENT)
Dept: UROLOGY | Age: 61
End: 2021-01-28
Payer: COMMERCIAL

## 2021-01-28 VITALS — TEMPERATURE: 97.1 F

## 2021-01-28 DIAGNOSIS — E29.1 HYPOGONADISM IN MALE: Primary | ICD-10-CM

## 2021-01-28 PROCEDURE — 96372 THER/PROPH/DIAG INJ SC/IM: CPT | Performed by: UROLOGY

## 2021-01-28 RX ORDER — TESTOSTERONE CYPIONATE 200 MG/ML
200 INJECTION INTRAMUSCULAR ONCE
Status: COMPLETED | OUTPATIENT
Start: 2021-01-28 | End: 2021-01-28

## 2021-01-28 RX ADMIN — TESTOSTERONE CYPIONATE 200 MG: 200 INJECTION INTRAMUSCULAR at 08:53

## 2021-01-28 NOTE — PROGRESS NOTES
Patient brought multi dose vial to appointment today for testosterone injection. 200mg/ml testosterone cypionate administered with 22 GA 1 inch needle.       Patient has given me verbal consent to perform Testosterone Injection yes     Following Dr. Derrek Mc of care.   Testosterone 200mg/ml GIVEN I.M Left UOQ hip  -pt given 1 mlof testosterone  Lot Number: [id number]  [facility] #: [id number]  Exp-02/2023     Patient supplied his own medications Yes    Patient to return in 2 weeks for next injection.

## 2021-02-11 ENCOUNTER — NURSE ONLY (OUTPATIENT)
Dept: UROLOGY | Age: 61
End: 2021-02-11
Payer: COMMERCIAL

## 2021-02-11 DIAGNOSIS — E29.1 HYPOGONADISM IN MALE: Primary | ICD-10-CM

## 2021-02-11 PROCEDURE — 96372 THER/PROPH/DIAG INJ SC/IM: CPT | Performed by: UROLOGY

## 2021-02-11 RX ORDER — TESTOSTERONE CYPIONATE 200 MG/ML
200 INJECTION INTRAMUSCULAR ONCE
Status: COMPLETED | OUTPATIENT
Start: 2021-02-11 | End: 2021-02-11

## 2021-02-11 RX ADMIN — TESTOSTERONE CYPIONATE 200 MG: 200 INJECTION INTRAMUSCULAR at 08:50

## 2021-02-11 NOTE — PROGRESS NOTES
Patient brought multi dose vial to appointment today for testosterone injection.      200mg/ml testosterone cypionate administered with 22 GA 1 inch needle.       Patient has given me verbal consent to perform Testosterone Injection yes     Following Dr. Phan's plan of care. Testosterone 200mg/ml GIVEN I.M RIGHT UOQ hip    -pt given 1 ml of testosterone  Lot Number: [id number]  [facility] #: [id number]  Exp-02/2023     Patient supplied his own medications Yes     Patient to return in 2 weeks for next injection.  WILL REVIEW HIS TESTOSTERONE LABS FIRST, BEFORE INJECTION IS GIVEN AT NEXT VISIT. PATIENT HAS HAD NO AFFECTS FROM MEDICATION AS OF TODAY.  MOVED LABS UP TO SEE IF VALUES HAVE CHANGED.  PATIENT WILL GET LABS DONE 2/18/21.

## 2021-02-18 ENCOUNTER — NURSE ONLY (OUTPATIENT)
Dept: LAB | Age: 61
End: 2021-02-18

## 2021-02-18 DIAGNOSIS — E29.1 HYPOGONADISM IN MALE: ICD-10-CM

## 2021-02-21 ENCOUNTER — TELEPHONE (OUTPATIENT)
Dept: UROLOGY | Age: 61
End: 2021-02-21

## 2021-02-21 LAB — TESTOSTERONE TOTAL: 894 NG/DL (ref 300–720)

## 2021-02-22 NOTE — TELEPHONE ENCOUNTER
Tell him his serum T is 894 ng/dl, which is ABOVE the upper limit of normal. Higher dose or more frequent dosing is not medically indicated.

## 2021-02-25 ENCOUNTER — OFFICE VISIT (OUTPATIENT)
Dept: UROLOGY | Age: 61
End: 2021-02-25
Payer: COMMERCIAL

## 2021-02-25 VITALS — WEIGHT: 215 LBS | HEIGHT: 69 IN | BODY MASS INDEX: 31.84 KG/M2 | TEMPERATURE: 96.9 F

## 2021-02-25 DIAGNOSIS — E29.1 HYPOGONADISM IN MALE: Primary | ICD-10-CM

## 2021-02-25 PROCEDURE — 1036F TOBACCO NON-USER: CPT | Performed by: UROLOGY

## 2021-02-25 PROCEDURE — G8484 FLU IMMUNIZE NO ADMIN: HCPCS | Performed by: UROLOGY

## 2021-02-25 PROCEDURE — 3017F COLORECTAL CA SCREEN DOC REV: CPT | Performed by: UROLOGY

## 2021-02-25 PROCEDURE — 96372 THER/PROPH/DIAG INJ SC/IM: CPT | Performed by: UROLOGY

## 2021-02-25 PROCEDURE — G8417 CALC BMI ABV UP PARAM F/U: HCPCS | Performed by: UROLOGY

## 2021-02-25 PROCEDURE — G8427 DOCREV CUR MEDS BY ELIG CLIN: HCPCS | Performed by: UROLOGY

## 2021-02-25 PROCEDURE — 99212 OFFICE O/P EST SF 10 MIN: CPT | Performed by: UROLOGY

## 2021-02-25 RX ORDER — TESTOSTERONE CYPIONATE 200 MG/ML
200 INJECTION INTRAMUSCULAR ONCE
Status: COMPLETED | OUTPATIENT
Start: 2021-02-25 | End: 2021-02-25

## 2021-02-25 RX ADMIN — TESTOSTERONE CYPIONATE 200 MG: 200 INJECTION INTRAMUSCULAR at 11:38

## 2021-02-25 NOTE — PROGRESS NOTES
59-year-old white male comes in to discuss some concerns. He is on TRT with excellent serum levels of testosterone in the high 800 NG/DL range. He was concerned about weight gain but frankly I think he has gained some significant lean body muscle mass. He has been placed on increased doses of BuSpar, clomipramine and is now on amlodipine. I told him in general antidepressants can cause a change in libido. Amlodipine is a 1-2% reported adverse effect of sexual dysfunction in men. Overall, he is somewhat satisfied with the TRT regimen. He is due for hemoglobin/hematocrit at the end of March. 200 mg testosterone cypionate IM today. I will call him with his lab results at the end of March.

## 2021-02-25 NOTE — PROGRESS NOTES
Patient brought multi dose vial to appointment today for testosterone injection.     200mg/ml testosterone cypionate administered with 22 GA 1 inch needle.         Patient has given me verbal consent to perform Testosterone Injection yes     Following Dr. Landen Santana of care. Testosterone 200mg/ml GIVEN I.M left UOQ-pt given 1 ml of testosterone  Lot Number: [id number]  [address] #: [id number]     Patient supplied their own medications Yes     Advised pt may have some muscle soreness for next couple days. Pt will bring his vial back with him in 2 weeks for next injection.

## 2021-03-11 ENCOUNTER — NURSE ONLY (OUTPATIENT)
Dept: UROLOGY | Age: 61
End: 2021-03-11
Payer: COMMERCIAL

## 2021-03-11 VITALS — TEMPERATURE: 97.2 F

## 2021-03-11 DIAGNOSIS — E34.9 TESTOSTERONE DEFICIENCY: Primary | ICD-10-CM

## 2021-03-11 PROCEDURE — 96372 THER/PROPH/DIAG INJ SC/IM: CPT | Performed by: UROLOGY

## 2021-03-11 RX ORDER — TESTOSTERONE CYPIONATE 200 MG/ML
200 INJECTION INTRAMUSCULAR ONCE
Status: COMPLETED | OUTPATIENT
Start: 2021-03-11 | End: 2021-03-11

## 2021-03-11 RX ADMIN — TESTOSTERONE CYPIONATE 200 MG: 200 INJECTION INTRAMUSCULAR at 07:50

## 2021-03-11 NOTE — PROGRESS NOTES
Patient brought multi dose vial to appointment today for testosterone injection.      200mg/ml testosterone cypionate administered with 22 GA 1 inch needle.         Patient has given me verbal consent to perform Testosterone Injection yes     Following Dr. Phan's plan of care. Testosterone 200mg/ml GIVEN I.M right UOQ-pt given 1 ml of testosterone  Lot Number: [id number]  [facility] #: [id number]     Patient supplied their own medications Yes     Advised pt may have some muscle soreness for next couple days.  Pt will bring his vial back with him in 2 weeks for next injection.

## 2021-03-25 ENCOUNTER — NURSE ONLY (OUTPATIENT)
Dept: UROLOGY | Age: 61
End: 2021-03-25
Payer: COMMERCIAL

## 2021-03-25 DIAGNOSIS — E34.9 TESTOSTERONE DEFICIENCY: Primary | ICD-10-CM

## 2021-03-25 PROCEDURE — 96372 THER/PROPH/DIAG INJ SC/IM: CPT | Performed by: UROLOGY

## 2021-03-25 RX ORDER — TESTOSTERONE CYPIONATE 200 MG/ML
200 INJECTION INTRAMUSCULAR ONCE
Status: COMPLETED | OUTPATIENT
Start: 2021-03-25 | End: 2021-03-25

## 2021-03-25 RX ADMIN — TESTOSTERONE CYPIONATE 200 MG: 200 INJECTION INTRAMUSCULAR at 09:04

## 2021-03-25 NOTE — PROGRESS NOTES
200mg/ml Testosterone Cypionate administered with 22 G 1 inch needle. Patient has given me verbal consent to perform Testosterone Injection. yes     Following  plan of care  Testosterone 200mg/ml GIVEN I.M. left UOQ hip- administered 1 ml of testosterone to patient  Lot Number: [id number]  [facility] #: [id number]  Exp-     Patient supplied his own medications yes     Advised pt may have some muscle soreness for next couple days.  Pt will bring his vial back with him in 2 weeks for next injection. He is due for hemoglobin/hematocrit at the end of March

## 2021-04-01 ENCOUNTER — TELEPHONE (OUTPATIENT)
Dept: UROLOGY | Age: 61
End: 2021-04-01

## 2021-04-01 ENCOUNTER — NURSE ONLY (OUTPATIENT)
Dept: LAB | Age: 61
End: 2021-04-01

## 2021-04-01 DIAGNOSIS — Z51.81 ENCOUNTER FOR MONITORING TESTOSTERONE REPLACEMENT THERAPY: Primary | ICD-10-CM

## 2021-04-01 DIAGNOSIS — E29.1 HYPOGONADISM IN MALE: ICD-10-CM

## 2021-04-01 DIAGNOSIS — Z79.890 ENCOUNTER FOR MONITORING TESTOSTERONE REPLACEMENT THERAPY: Primary | ICD-10-CM

## 2021-04-01 LAB
HCT VFR BLD CALC: 45.6 % (ref 42–52)
HEMOGLOBIN: 14.8 GM/DL (ref 14–18)

## 2021-04-01 NOTE — TELEPHONE ENCOUNTER
Attempted to call the patient. After checking [employer] voicemail left stating the labs were normal and to repeat in 6 months. Order sent via mail.

## 2021-04-08 ENCOUNTER — NURSE ONLY (OUTPATIENT)
Dept: UROLOGY | Age: 61
End: 2021-04-08
Payer: COMMERCIAL

## 2021-04-08 DIAGNOSIS — E29.1 HYPOGONADISM IN MALE: Primary | ICD-10-CM

## 2021-04-08 PROCEDURE — 96372 THER/PROPH/DIAG INJ SC/IM: CPT | Performed by: UROLOGY

## 2021-04-08 RX ORDER — TESTOSTERONE CYPIONATE 200 MG/ML
200 INJECTION INTRAMUSCULAR ONCE
Status: COMPLETED | OUTPATIENT
Start: 2021-04-08 | End: 2021-04-08

## 2021-04-08 RX ADMIN — TESTOSTERONE CYPIONATE 200 MG: 200 INJECTION INTRAMUSCULAR at 09:03

## 2021-04-22 ENCOUNTER — NURSE ONLY (OUTPATIENT)
Dept: UROLOGY | Age: 61
End: 2021-04-22
Payer: COMMERCIAL

## 2021-04-22 DIAGNOSIS — E34.9 TESTOSTERONE DEFICIENCY: ICD-10-CM

## 2021-04-22 PROCEDURE — 96372 THER/PROPH/DIAG INJ SC/IM: CPT | Performed by: NURSE PRACTITIONER

## 2021-04-22 RX ORDER — TESTOSTERONE CYPIONATE 200 MG/ML
200 INJECTION INTRAMUSCULAR ONCE
Status: COMPLETED | OUTPATIENT
Start: 2021-04-22 | End: 2021-04-22

## 2021-04-22 RX ADMIN — TESTOSTERONE CYPIONATE 200 MG: 200 INJECTION INTRAMUSCULAR at 08:08

## 2021-04-22 NOTE — PROGRESS NOTES
Patient brought multi dose vial to appointment today for testosterone injection.      200mg/ml testosterone cypionate administered with 22 GA 1 1/2 inch needle.         Patient has given me verbal consent to perform Testosterone Injection yes     Following Nisreen ALVARADO-CNP plan of care. Testosterone 200mg/ml GIVEN I.M left UOQ-pt given 1 ml of testosterone  Lot Number: [id number]  [facility] #: [id number]  EXP: 02/2023     Patient supplied their own medications Yes     Advised pt may have some muscle soreness for next couple days.  Pt will bring his vial back with him in 2 weeks for next injection.

## 2021-05-07 ENCOUNTER — NURSE ONLY (OUTPATIENT)
Dept: UROLOGY | Age: 61
End: 2021-05-07
Payer: COMMERCIAL

## 2021-05-07 DIAGNOSIS — E34.9 TESTOSTERONE DEFICIENCY: Primary | ICD-10-CM

## 2021-05-07 DIAGNOSIS — R79.89 LOW TESTOSTERONE: Primary | ICD-10-CM

## 2021-05-07 PROCEDURE — 99999 PR OFFICE/OUTPT VISIT,PROCEDURE ONLY: CPT | Performed by: NURSE PRACTITIONER

## 2021-05-07 PROCEDURE — 96372 THER/PROPH/DIAG INJ SC/IM: CPT | Performed by: NURSE PRACTITIONER

## 2021-05-07 RX ORDER — TESTOSTERONE CYPIONATE 200 MG/ML
200 INJECTION INTRAMUSCULAR ONCE
Status: COMPLETED | OUTPATIENT
Start: 2021-05-07 | End: 2021-05-07

## 2021-05-07 RX ADMIN — TESTOSTERONE CYPIONATE 200 MG: 200 INJECTION INTRAMUSCULAR at 10:42

## 2021-05-07 NOTE — PROGRESS NOTES
Pt came in today for testosterone injection. He only had 0.5cc left in vial to draw up. He said he would go  his refill and bring new vial in at 1030 today for injection.

## 2021-05-21 ENCOUNTER — NURSE ONLY (OUTPATIENT)
Dept: UROLOGY | Age: 61
End: 2021-05-21
Payer: COMMERCIAL

## 2021-05-21 DIAGNOSIS — E34.9 TESTOSTERONE DEFICIENCY: Primary | ICD-10-CM

## 2021-05-21 PROCEDURE — 96372 THER/PROPH/DIAG INJ SC/IM: CPT | Performed by: NURSE PRACTITIONER

## 2021-05-21 RX ORDER — TESTOSTERONE CYPIONATE 200 MG/ML
200 INJECTION INTRAMUSCULAR ONCE
Status: COMPLETED | OUTPATIENT
Start: 2021-05-21 | End: 2021-05-21

## 2021-05-21 RX ADMIN — TESTOSTERONE CYPIONATE 200 MG: 200 INJECTION INTRAMUSCULAR at 07:53

## 2021-05-21 NOTE — PROGRESS NOTES
200mg/ml Testosterone Cypionate administered with 22 G 1 1/2 inch needle. Patient has given me verbal consent to perform Testosterone Injection. YES     Following [employer] APRN plan of care  Testosterone 200mg/ml GIVEN I.M. LEFT UOQ hip- administered 1 ml of testosterone to patient  Lot Number: [id number]  [address] #: [id number]  Exp-11/23     Patient supplied his own medications YES     Patient to return in 2 weeks for next injection.

## 2021-05-25 ENCOUNTER — TELEPHONE (OUTPATIENT)
Dept: FAMILY MEDICINE CLINIC | Age: 61
End: 2021-05-25

## 2021-05-25 DIAGNOSIS — E78.1 HYPERTRIGLYCERIDEMIA: ICD-10-CM

## 2021-05-25 DIAGNOSIS — Z00.00 ROUTINE GENERAL MEDICAL EXAMINATION AT A HEALTH CARE FACILITY: ICD-10-CM

## 2021-05-25 DIAGNOSIS — I10 ESSENTIAL HYPERTENSION: Primary | ICD-10-CM

## 2021-05-25 DIAGNOSIS — R73.01 IFG (IMPAIRED FASTING GLUCOSE): ICD-10-CM

## 2021-05-25 NOTE — TELEPHONE ENCOUNTER
----- Message from Fe Rodríguez sent at 5/25/2021  1:55 PM EDT -----  Subject: Referral Request    QUESTIONS   Reason for referral request? Pt has appt on 6/3/2021 and would like an   order for complete bloodwork before his appt. Pt states that he does not   need a PSA because he will have that w/his urologist. Pt is requesting for   office to call him once those orders are ready so that he may com   Has the physician seen you for this condition before? No   Preferred Specialist (if applicable)? Do you already have an appointment scheduled? No  Additional Information for Provider? Pt is requesting for office to call   him once those orders are ready so that he may come and pick them up appt   on 6/3  ---------------------------------------------------------------------------  --------------  [address]  What is the best way for the office to contact you? OK to leave message on   voicemail  Preferred Call Back Phone Number?  [phone]

## 2021-05-28 ENCOUNTER — NURSE ONLY (OUTPATIENT)
Dept: LAB | Age: 61
End: 2021-05-28

## 2021-05-28 DIAGNOSIS — R73.01 IFG (IMPAIRED FASTING GLUCOSE): ICD-10-CM

## 2021-05-28 DIAGNOSIS — E78.1 HYPERTRIGLYCERIDEMIA: ICD-10-CM

## 2021-05-28 DIAGNOSIS — Z00.00 ROUTINE GENERAL MEDICAL EXAMINATION AT A HEALTH CARE FACILITY: ICD-10-CM

## 2021-05-28 LAB
ALBUMIN SERPL-MCNC: 4.5 G/DL (ref 3.5–5.1)
ALP BLD-CCNC: 54 U/L (ref 38–126)
ALT SERPL-CCNC: 24 U/L (ref 11–66)
ANION GAP SERPL CALCULATED.3IONS-SCNC: 13 MEQ/L (ref 8–16)
AST SERPL-CCNC: 26 U/L (ref 5–40)
AVERAGE GLUCOSE: 123 MG/DL (ref 70–126)
BILIRUB SERPL-MCNC: 0.6 MG/DL (ref 0.3–1.2)
BUN BLDV-MCNC: 15 MG/DL (ref 7–22)
CALCIUM SERPL-MCNC: 9.6 MG/DL (ref 8.5–10.5)
CHLORIDE BLD-SCNC: 94 MEQ/L (ref 98–111)
CHOLESTEROL, TOTAL: 199 MG/DL (ref 100–199)
CO2: 26 MEQ/L (ref 23–33)
CREAT SERPL-MCNC: 1 MG/DL (ref 0.4–1.2)
GFR SERPL CREATININE-BSD FRML MDRD: 76 ML/MIN/1.73M2
GLUCOSE BLD-MCNC: 123 MG/DL (ref 70–108)
HBA1C MFR BLD: 6.1 % (ref 4.4–6.4)
HDLC SERPL-MCNC: 32 MG/DL
LDL CHOLESTEROL CALCULATED: 135 MG/DL
POTASSIUM SERPL-SCNC: 3.9 MEQ/L (ref 3.5–5.2)
SODIUM BLD-SCNC: 133 MEQ/L (ref 135–145)
TOTAL PROTEIN: 7.3 G/DL (ref 6.1–8)
TRIGL SERPL-MCNC: 162 MG/DL (ref 0–199)

## 2021-05-31 DIAGNOSIS — I10 ESSENTIAL HYPERTENSION: ICD-10-CM

## 2021-05-31 DIAGNOSIS — K21.9 GASTROESOPHAGEAL REFLUX DISEASE: ICD-10-CM

## 2021-06-02 ENCOUNTER — OFFICE VISIT (OUTPATIENT)
Dept: FAMILY MEDICINE CLINIC | Age: 61
End: 2021-06-02
Payer: COMMERCIAL

## 2021-06-02 VITALS
DIASTOLIC BLOOD PRESSURE: 84 MMHG | SYSTOLIC BLOOD PRESSURE: 130 MMHG | TEMPERATURE: 98 F | HEIGHT: 69 IN | WEIGHT: 206.8 LBS | BODY MASS INDEX: 30.63 KG/M2 | HEART RATE: 76 BPM | RESPIRATION RATE: 16 BRPM

## 2021-06-02 DIAGNOSIS — K21.9 GASTROESOPHAGEAL REFLUX DISEASE, UNSPECIFIED WHETHER ESOPHAGITIS PRESENT: ICD-10-CM

## 2021-06-02 DIAGNOSIS — Z00.00 ANNUAL PHYSICAL EXAM: Primary | ICD-10-CM

## 2021-06-02 DIAGNOSIS — B00.9 HERPES INFECTION: ICD-10-CM

## 2021-06-02 DIAGNOSIS — I10 ESSENTIAL HYPERTENSION: ICD-10-CM

## 2021-06-02 PROCEDURE — 99396 PREV VISIT EST AGE 40-64: CPT | Performed by: FAMILY MEDICINE

## 2021-06-02 RX ORDER — TESTOSTERONE CYPIONATE 200 MG/ML
INJECTION INTRAMUSCULAR
COMMUNITY
Start: 2021-05-07 | End: 2022-06-20 | Stop reason: SDUPTHER

## 2021-06-02 RX ORDER — AMLODIPINE BESYLATE 5 MG/1
5 TABLET ORAL DAILY
Qty: 90 TABLET | Refills: 3 | Status: SHIPPED | OUTPATIENT
Start: 2021-06-02 | End: 2022-06-20 | Stop reason: SDUPTHER

## 2021-06-02 RX ORDER — METOPROLOL SUCCINATE 100 MG/1
100 TABLET, EXTENDED RELEASE ORAL DAILY
Qty: 90 TABLET | OUTPATIENT
Start: 2021-06-02

## 2021-06-02 RX ORDER — VALSARTAN AND HYDROCHLOROTHIAZIDE 320; 25 MG/1; MG/1
1 TABLET, FILM COATED ORAL DAILY
Qty: 90 TABLET | Refills: 3 | Status: SHIPPED | OUTPATIENT
Start: 2021-06-02 | End: 2022-06-20 | Stop reason: SDUPTHER

## 2021-06-02 RX ORDER — SERTRALINE HYDROCHLORIDE 100 MG/1
100 TABLET, FILM COATED ORAL DAILY
COMMUNITY
Start: 2021-05-14

## 2021-06-02 RX ORDER — AMOXICILLIN 500 MG/1
CAPSULE ORAL
COMMUNITY
Start: 2021-05-16 | End: 2022-06-20

## 2021-06-02 RX ORDER — ESOMEPRAZOLE MAGNESIUM 40 MG/1
40 CAPSULE, DELAYED RELEASE ORAL
Qty: 90 CAPSULE | Refills: 3 | Status: SHIPPED | OUTPATIENT
Start: 2021-06-02 | End: 2022-06-20 | Stop reason: SDUPTHER

## 2021-06-02 RX ORDER — VALACYCLOVIR HYDROCHLORIDE 500 MG/1
500 TABLET, FILM COATED ORAL DAILY
Qty: 90 TABLET | Refills: 3 | Status: SHIPPED | OUTPATIENT
Start: 2021-06-02 | End: 2022-06-20 | Stop reason: SDUPTHER

## 2021-06-02 RX ORDER — ARIPIPRAZOLE 2 MG/1
2 TABLET ORAL DAILY
COMMUNITY
Start: 2021-05-21

## 2021-06-02 RX ORDER — ESOMEPRAZOLE MAGNESIUM 40 MG/1
CAPSULE, DELAYED RELEASE ORAL
Qty: 90 CAPSULE | OUTPATIENT
Start: 2021-06-02

## 2021-06-02 RX ORDER — METOPROLOL SUCCINATE 100 MG/1
100 TABLET, EXTENDED RELEASE ORAL DAILY
Qty: 90 TABLET | Refills: 3 | Status: SHIPPED | OUTPATIENT
Start: 2021-06-02 | End: 2022-06-20 | Stop reason: SDUPTHER

## 2021-06-02 RX ORDER — CLONAZEPAM 0.5 MG/1
TABLET ORAL
COMMUNITY
Start: 2021-05-17 | End: 2022-06-20

## 2021-06-02 ASSESSMENT — ENCOUNTER SYMPTOMS
SHORTNESS OF BREATH: 0
CHEST TIGHTNESS: 0
BLOOD IN STOOL: 0
ABDOMINAL PAIN: 0
EYES NEGATIVE: 1

## 2021-06-02 NOTE — PATIENT INSTRUCTIONS
Patient Education        Well Visit, Men 48 to 72: Care Instructions  Overview     Well visits can help you stay healthy. Your doctor has checked your overall health and may have suggested ways to take good care of yourself. Your doctor also may have recommended tests. At home, you can help prevent illness with healthy eating, regular exercise, and other steps. Follow-up care is a key part of your treatment and safety. Be sure to make and go to all appointments, and call your doctor if you are having problems. It's also a good idea to know your test results and keep a list of the medicines you take. How can you care for yourself at home? · Get screening tests that you and your doctor decide on. Screening helps find diseases before any symptoms appear. · Eat healthy foods. Choose fruits, vegetables, whole grains, protein, and low-fat dairy foods. Limit fat, especially saturated fat. Reduce salt in your diet. · Limit alcohol. Have no more than 2 drinks a day or 14 drinks a week. · Get at least 30 minutes of exercise on most days of the week. Walking is a good choice. You also may want to do other activities, such as running, swimming, cycling, or playing tennis or team sports. · Reach and stay at a healthy weight. This will lower your risk for many problems, such as obesity, diabetes, heart disease, and high blood pressure. · Do not smoke. Smoking can make health problems worse. If you need help quitting, talk to your doctor about stop-smoking programs and medicines. These can increase your chances of quitting for good. · Care for your mental health. It is easy to get weighed down by worry and stress. Learn strategies to manage stress, like deep breathing and mindfulness, and stay connected with your family and community. If you find you often feel sad or hopeless, talk with your doctor. Treatment can help.   · Talk to your doctor about whether you have any risk factors for sexually transmitted infections your use of this information.

## 2021-06-02 NOTE — PROGRESS NOTES
2021    Kell Babcock (:  1960) is a 63 y.o. male, here for a preventive medicine evaluation. Patient Active Problem List   Diagnosis    HTN (hypertension)    Hypertriglyceridemia    GERD (gastroesophageal reflux disease)    Depression    Chronic prostatitis    Erectile dysfunction    Herpes infection    IFG (impaired fasting glucose)     Doing well. Denies complaint. His psychiatrist has recently changed some of his medications and his depression and anxiety are improving. BPs have been stable. He works out at [employer] regularly. Takes all meds as directed and denies side effects. No recent illnesses or hospitalizations. No changes in family history. Nonsmoker. Body mass index is 30.46 kg/m². Review of Systems   Constitutional: Negative for chills, fatigue, fever and unexpected weight change. HENT: Negative. Eyes: Negative. Respiratory: Negative for chest tightness and shortness of breath. Cardiovascular: Negative for chest pain, palpitations and leg swelling. Gastrointestinal: Negative for abdominal pain and blood in stool. Genitourinary: Negative for dysuria. Musculoskeletal: Negative for joint swelling. Skin: Negative for rash. Neurological: Negative for dizziness. Psychiatric/Behavioral: Positive for agitation and dysphoric mood. The patient is nervous/anxious. All other systems reviewed and are negative. Prior to Visit Medications    Medication Sig Taking?  Authorizing Provider   metoprolol succinate (TOPROL XL) 100 MG extended release tablet Take 1 tablet by mouth daily Yes Benito Mckenzie MD   esomeprazole (NEXIUM) 40 MG delayed release capsule Take 1 capsule by mouth every morning (before breakfast) Yes Benito Mckenzie MD   valACYclovir (VALTREX) 500 MG tablet Take 1 tablet by mouth daily Yes Benito Mckenzie MD   amoxicillin (AMOXIL) 500 MG capsule TAKE 4 CAPSULES BY MOUTH 1 HOUR PRIOR TO APPOINTMENT Yes Historical use: No    Drug use: Not on file    Sexual activity: Not on file   Other Topics Concern    Not on file   Social History Narrative    Not on file     Social Determinants of Health     Financial Resource Strain:     Difficulty of Paying Living Expenses:    Food Insecurity:     Worried About Running Out of Food in the Last Year:     [address] in the Last Year:    Transportation Needs:     Lack of Transportation (Medical):  Lack of Transportation (Non-Medical):    Physical Activity:     Days of Exercise per Week:     Minutes of Exercise per Session:    Stress:     Feeling of Stress :    Social Connections:     Frequency of Communication with Friends and Family:     Frequency of Social Gatherings with Friends and Family:     Attends [employer] Services:     Active Member of Clubs or Organizations:     Attends Club or Organization Meetings:     Marital Status:    Intimate Partner Violence:     Fear of Current or Ex-Partner:     Emotionally Abused:     Physically Abused:     Sexually Abused:         Family History   Problem Relation Age of Onset    Cancer Father         Lung    Heart Attack Paternal Grandfather          Vitals:    06/02/21 1440   BP: 130/84   Pulse: 76   Resp: 16   Temp: 98 °F (36.7 °C)   TempSrc: Oral   Weight: 206 lb 12.8 oz (93.8 kg)   Height: 5' 9.09\" (1.755 m)     Estimated body mass index is 30.46 kg/m² as calculated from the following:    Height as of this encounter: 5' 9.09\" (1.755 m). Weight as of this encounter: 206 lb 12.8 oz (93.8 kg). Physical Exam  Vitals reviewed. Constitutional:       General: He is not in acute distress. Appearance: He is well-developed. HENT:      Head: Normocephalic and atraumatic. Right Ear: Tympanic membrane normal.      Left Ear: Tympanic membrane normal.      Mouth/Throat:      Mouth: Mucous membranes are moist.      Pharynx: No posterior oropharyngeal erythema.    Eyes:      Conjunctiva/sclera: Conjunctivae normal.

## 2021-06-04 ENCOUNTER — NURSE ONLY (OUTPATIENT)
Dept: UROLOGY | Age: 61
End: 2021-06-04
Payer: COMMERCIAL

## 2021-06-04 DIAGNOSIS — E29.1 HYPOGONADISM IN MALE: Primary | ICD-10-CM

## 2021-06-04 PROCEDURE — 96372 THER/PROPH/DIAG INJ SC/IM: CPT | Performed by: UROLOGY

## 2021-06-04 RX ORDER — TESTOSTERONE CYPIONATE 200 MG/ML
200 INJECTION INTRAMUSCULAR ONCE
Status: COMPLETED | OUTPATIENT
Start: 2021-06-04 | End: 2021-06-04

## 2021-06-04 RX ADMIN — TESTOSTERONE CYPIONATE 200 MG: 200 INJECTION INTRAMUSCULAR at 07:52

## 2021-06-21 ENCOUNTER — NURSE ONLY (OUTPATIENT)
Dept: UROLOGY | Age: 61
End: 2021-06-21
Payer: COMMERCIAL

## 2021-06-21 DIAGNOSIS — E29.1 HYPOGONADISM IN MALE: ICD-10-CM

## 2021-06-21 DIAGNOSIS — B00.9 HERPES INFECTION: ICD-10-CM

## 2021-06-21 DIAGNOSIS — E34.9 TESTOSTERONE DEFICIENCY: Primary | ICD-10-CM

## 2021-06-21 PROCEDURE — 96372 THER/PROPH/DIAG INJ SC/IM: CPT | Performed by: NURSE PRACTITIONER

## 2021-06-21 RX ORDER — VALACYCLOVIR HYDROCHLORIDE 500 MG/1
500 TABLET, FILM COATED ORAL DAILY
Qty: 30 TABLET | OUTPATIENT
Start: 2021-06-21

## 2021-06-21 RX ORDER — TESTOSTERONE CYPIONATE 200 MG/ML
200 INJECTION INTRAMUSCULAR ONCE
Status: COMPLETED | OUTPATIENT
Start: 2021-06-21 | End: 2021-06-21

## 2021-06-21 RX ADMIN — TESTOSTERONE CYPIONATE 200 MG: 200 INJECTION INTRAMUSCULAR at 10:53

## 2021-06-21 NOTE — PROGRESS NOTES
200mg/ml Testosterone Cypionate administered with 22 G 1.5  inch needle.      Patient has given me verbal consent to perform Testosterone Injection. yes     Following Dr. Syed Torres of care  Testosterone 200mg/ml GIVEN I.M. Left UOQ hip- administered 1 ml of testosterone to patient  Lot Number: [id number]  [facility] #: [id number]  Exp-     Patient supplied his own medications yes     Patient to return in 2 weeks for next injection.

## 2021-07-07 ENCOUNTER — NURSE ONLY (OUTPATIENT)
Dept: UROLOGY | Age: 61
End: 2021-07-07
Payer: COMMERCIAL

## 2021-07-07 DIAGNOSIS — R79.89 LOW TESTOSTERONE: ICD-10-CM

## 2021-07-07 PROCEDURE — 96372 THER/PROPH/DIAG INJ SC/IM: CPT | Performed by: NURSE PRACTITIONER

## 2021-07-07 RX ORDER — TESTOSTERONE CYPIONATE 200 MG/ML
200 INJECTION INTRAMUSCULAR ONCE
Status: COMPLETED | OUTPATIENT
Start: 2021-07-07 | End: 2021-07-07

## 2021-07-07 RX ADMIN — TESTOSTERONE CYPIONATE 200 MG: 200 INJECTION INTRAMUSCULAR at 07:58

## 2021-07-07 NOTE — PROGRESS NOTES
200mg/ml Testosterone Cypionate administered with 22 G 1.5 inch needle. Patient has given me verbal consent to perform Testosterone Injection. yes     Following Dr. Sami Swanson plan of care  Testosterone 200mg/ml GIVEN I.M. Rt UOQ hip- administered 1 ml of testosterone to patient  Lot Number: [id number]  [facility] #: [id number]  Exp-11/2023     Patient supplied his own medications yes     Patient to return in 2 weeks for next injection.

## 2021-07-21 ENCOUNTER — NURSE ONLY (OUTPATIENT)
Dept: UROLOGY | Age: 61
End: 2021-07-21
Payer: COMMERCIAL

## 2021-07-21 DIAGNOSIS — R79.89 LOW TESTOSTERONE: ICD-10-CM

## 2021-07-21 DIAGNOSIS — Z12.5 SCREENING PSA (PROSTATE SPECIFIC ANTIGEN): Primary | ICD-10-CM

## 2021-07-21 PROCEDURE — 96372 THER/PROPH/DIAG INJ SC/IM: CPT | Performed by: UROLOGY

## 2021-07-21 RX ORDER — TESTOSTERONE CYPIONATE 200 MG/ML
200 INJECTION INTRAMUSCULAR ONCE
Status: COMPLETED | OUTPATIENT
Start: 2021-07-21 | End: 2021-07-21

## 2021-07-21 RX ADMIN — TESTOSTERONE CYPIONATE 200 MG: 200 INJECTION INTRAMUSCULAR at 08:02

## 2021-07-21 NOTE — PROGRESS NOTES
200mg/ml Testosterone Cypionate administered with 22 G 1.5 inch needle. Patient has given me verbal consent to perform Testosterone Injection. yes     Following Dr. iKnsey Cook plan of care  Testosterone 200mg/ml GIVEN I.M. right UOQ hip- administered 1 ml of testosterone to patient  Lot Number: [id number]  [facility] #: [id number]  Exp-11/01/2023     Patient supplied his own medications yes     Patient to return in 2 weeks for next injection. Patient will need refill on medication. Patient to have labs in one week and then follow up with Dr. Gen Gaston for OV and refill.

## 2021-07-29 ENCOUNTER — NURSE ONLY (OUTPATIENT)
Dept: LAB | Age: 61
End: 2021-07-29

## 2021-07-29 DIAGNOSIS — Z12.5 SCREENING PSA (PROSTATE SPECIFIC ANTIGEN): ICD-10-CM

## 2021-07-29 DIAGNOSIS — E34.9 TESTOSTERONE DEFICIENCY: ICD-10-CM

## 2021-07-29 DIAGNOSIS — R79.89 LOW TESTOSTERONE: ICD-10-CM

## 2021-07-29 LAB
HCT VFR BLD CALC: 45.3 % (ref 42–52)
HEMOGLOBIN: 15.1 GM/DL (ref 14–18)
PROSTATE SPECIFIC ANTIGEN: 0.97 NG/ML (ref 0–1)

## 2021-07-31 LAB — TESTOSTERONE FREE: NORMAL

## 2021-08-06 ENCOUNTER — NURSE ONLY (OUTPATIENT)
Dept: UROLOGY | Age: 61
End: 2021-08-06
Payer: COMMERCIAL

## 2021-08-06 ENCOUNTER — OFFICE VISIT (OUTPATIENT)
Dept: UROLOGY | Age: 61
End: 2021-08-06
Payer: COMMERCIAL

## 2021-08-06 VITALS
DIASTOLIC BLOOD PRESSURE: 68 MMHG | HEIGHT: 69 IN | SYSTOLIC BLOOD PRESSURE: 124 MMHG | WEIGHT: 206 LBS | BODY MASS INDEX: 30.51 KG/M2

## 2021-08-06 DIAGNOSIS — E29.1 HYPOGONADISM IN MALE: ICD-10-CM

## 2021-08-06 DIAGNOSIS — R97.20 RISING PSA LEVEL: ICD-10-CM

## 2021-08-06 DIAGNOSIS — E29.1 HYPOGONADISM IN MALE: Primary | ICD-10-CM

## 2021-08-06 PROCEDURE — G8427 DOCREV CUR MEDS BY ELIG CLIN: HCPCS | Performed by: UROLOGY

## 2021-08-06 PROCEDURE — 3017F COLORECTAL CA SCREEN DOC REV: CPT | Performed by: UROLOGY

## 2021-08-06 PROCEDURE — 99214 OFFICE O/P EST MOD 30 MIN: CPT | Performed by: UROLOGY

## 2021-08-06 PROCEDURE — 1036F TOBACCO NON-USER: CPT | Performed by: UROLOGY

## 2021-08-06 PROCEDURE — G8417 CALC BMI ABV UP PARAM F/U: HCPCS | Performed by: UROLOGY

## 2021-08-06 PROCEDURE — 96372 THER/PROPH/DIAG INJ SC/IM: CPT | Performed by: UROLOGY

## 2021-08-06 RX ORDER — TESTOSTERONE CYPIONATE 200 MG/ML
200 INJECTION INTRAMUSCULAR ONCE
Status: COMPLETED | OUTPATIENT
Start: 2021-08-06 | End: 2021-08-06

## 2021-08-06 RX ORDER — TESTOSTERONE CYPIONATE 200 MG/ML
200 INJECTION INTRAMUSCULAR
Qty: 12 ML | Refills: 0 | Status: SHIPPED | OUTPATIENT
Start: 2021-08-06 | End: 2022-01-14 | Stop reason: SDUPTHER

## 2021-08-06 RX ADMIN — TESTOSTERONE CYPIONATE 200 MG: 200 INJECTION INTRAMUSCULAR at 10:15

## 2021-08-06 NOTE — PROGRESS NOTES
200mg/ml Testosterone Cypionate administered with yes 22 G 1.5 inch needle. Patient has given me verbal consent to perform Testosterone Injection. yes     Following Dr. Asha Armas plan of care  Testosterone 200mg/ml GIVEN I.M. left UOQ hip- administered 200 ml of testosterone to patient  Lot Number: [id number]  [facility] #: [id number]  Exp-11/2023     Patient supplied his own medications yes     Patient to return in 2 weeks for next injection.

## 2021-08-06 NOTE — PROGRESS NOTES
MD Kristen Mejia 83 [address] [address]  Dept: [phone]  Dept Fax: [phone]: [address] [facility]      Patient:  Valencia Garza  YOB: 1960    The patient is a 63 y.o. male who presents today for evaluation of the following problems:   Chief Complaint   Patient presents with    Follow-up     hypogonadism in male, Dr So Mcdaniel pt, labs prior        HISTORY OF PRESENT ILLNESS:     Hypogonadism  On trt injections every two weeks    Rising PSA  Pt is concerned about 0.7 rise    Summary of Previous Records:  78-year-old white male comes in to discuss some concerns. He is on TRT with excellent serum levels of testosterone in the high 800 NG/DL range. He was concerned about weight gain but frankly I think he has gained some significant lean body muscle mass. He has been placed on increased doses of BuSpar, clomipramine and is now on amlodipine. I told him in general antidepressants can cause a change in libido. Amlodipine is a 1-2% reported adverse effect of sexual dysfunction in men. Overall, he is somewhat satisfied with the TRT regimen. He is due for hemoglobin/hematocrit at the end of March. 200 mg testosterone cypionate IM today. I will call him with his lab results at the end of March. Requested/reviewed records from Malgorzata Arechiga MD office and/or outside physician/EMR    (Patient's old records have been requested, reviewed and pertinent findings summarized in today's note.)    Procedures Today: N/A    Last several PSA's:  Lab Results   Component Value Date    PSA 0.97 07/29/2021    PSA 0.10 08/07/2020    PSA 0.21 07/25/2017       Last total testosterone:  Lab Results   Component Value Date    TESTOSTERONE 894 (H) 02/18/2021       Urinalysis today:  No results found for this visit on 08/06/21.     Last BUN and creatinine:  Lab Results Component Value Date    BUN 15 05/28/2021     Lab Results   Component Value Date    CREATININE 1.0 05/28/2021       Imaging Reviewed during this Office Visit:   Aleks Arias MD independently reviewed the images and verified the radiology reports from:    No results found.     PAST MEDICAL, FAMILY AND SOCIAL HISTORY:  Past Medical History:   Diagnosis Date    Depression     Genital herpes     GERD (gastroesophageal reflux disease)     Hematospermia     Hyperlipidemia     Hypertension     OCD (obsessive compulsive disorder)     UTI (urinary tract infection)      Past Surgical History:   Procedure Laterality Date    ENDOSCOPY, COLON, DIAGNOSTIC      SHOULDER SURGERY Left 11/18/2019    Dr. Jac Arnold Right 12/2019     Family History   Problem Relation Age of Onset    Cancer Father         Lung    Heart Attack Paternal Grandfather      Outpatient Medications Marked as Taking for the 8/6/21 encounter (Office Visit) with Sandra Torres MD   Medication Sig Dispense Refill    metoprolol succinate (TOPROL XL) 100 MG extended release tablet Take 1 tablet by mouth daily 90 tablet 3    esomeprazole (NEXIUM) 40 MG delayed release capsule Take 1 capsule by mouth every morning (before breakfast) 90 capsule 3    valACYclovir (VALTREX) 500 MG tablet Take 1 tablet by mouth daily 90 tablet 3    amoxicillin (AMOXIL) 500 MG capsule TAKE 4 CAPSULES BY MOUTH 1 HOUR PRIOR TO APPOINTMENT      sertraline (ZOLOFT) 100 MG tablet       testosterone cypionate (DEPOTESTOTERONE CYPIONATE) 200 MG/ML injection       ARIPiprazole (ABILIFY) 2 MG tablet       clonazePAM (KLONOPIN) 0.5 MG tablet       amLODIPine (NORVASC) 5 MG tablet Take 1 tablet by mouth daily 90 tablet 3    valsartan-hydroCHLOROthiazide (DIOVAN HCT) 320-25 MG per tablet Take 1 tablet by mouth daily 90 tablet 3    ibuprofen (ADVIL;MOTRIN) 200 MG tablet Take 200 mg by mouth every 6 hours as needed for Pain      busPIRone (BUSPAR) 10 MG tablet Take 10 mg by mouth as needed       clomiPRAMINE (ANAFRANIL) 50 MG capsule 4 tabs at bedtime prn 120 capsule 5    aspirin 81 MG chewable tablet Take 81 mg by mouth nightly.  fish oil-omega-3 fatty acids 1000 MG capsule Take 2 g by mouth daily.  Multiple Vitamin (MULTIVITAMIN PO) Take  by mouth. Patient has no known allergies. Social History     Tobacco Use   Smoking Status Never Smoker   Smokeless Tobacco Never Used      (If patient a smoker, smoking cessation counseling offered)   Social History     Substance and Sexual Activity   Alcohol Use No       REVIEW OF SYSTEMS:  Constitutional: negative  Eyes: negative  Respiratory: negative  Cardiovascular: negative  Gastrointestinal: negative  Genitourinary: see HPI  Musculoskeletal: negative  Skin: negative   Neurological: negative  Hematological/Lymphatic: negative  Psychological: negative      Physical Exam:    This a 63 y.o. male  Vitals:    08/06/21 0803   BP: 124/68     Body mass index is 30.42 kg/m². Constitutional: Patient in no acute distress;       Assessment and Plan        1. Hypogonadism in male    2. Rising PSA level               Plan:      Cbc, psa, lft, testosterone in six months  Will follow rising psa- new problem. Reassured patient  Discussed trt risks etc    Getting injections in office  Labs in six months for trt toxicity  Refilled trt        Prescriptions Ordered:  No orders of the defined types were placed in this encounter. Orders Placed:  No orders of the defined types were placed in this encounter.            Evelio Cartagena MD

## 2021-08-20 ENCOUNTER — NURSE ONLY (OUTPATIENT)
Dept: UROLOGY | Age: 61
End: 2021-08-20
Payer: COMMERCIAL

## 2021-08-20 DIAGNOSIS — E29.1 HYPOGONADISM IN MALE: ICD-10-CM

## 2021-08-20 PROCEDURE — 96372 THER/PROPH/DIAG INJ SC/IM: CPT | Performed by: UROLOGY

## 2021-08-20 RX ORDER — TESTOSTERONE CYPIONATE 200 MG/ML
200 INJECTION INTRAMUSCULAR ONCE
Status: COMPLETED | OUTPATIENT
Start: 2021-08-20 | End: 2021-08-20

## 2021-08-20 RX ADMIN — TESTOSTERONE CYPIONATE 200 MG: 200 INJECTION INTRAMUSCULAR at 08:04

## 2021-08-20 NOTE — PROGRESS NOTES
I have personally verified, reviewed, released, signed, authenticated, authorized, confirmed,finalized, and approved the actions of the [facility]. 200mg/ml Testosterone Cypionate administered with yes 22 G 1.5 inch needle.      Patient has given me verbal consent to perform Testosterone Injection.  yes     Following Dr. Ara Cutler of care  Testosterone 200mg/ml GIVEN I.M. right UOQ hip- administered 200 ml of testosterone to patient  Lot Number: [id number]  [facility] #: [id number]  Exp-11/2023     Patient supplied his own medications yes     Patient to return in 2 weeks for next injection

## 2021-09-03 ENCOUNTER — NURSE ONLY (OUTPATIENT)
Dept: UROLOGY | Age: 61
End: 2021-09-03
Payer: COMMERCIAL

## 2021-09-03 DIAGNOSIS — R79.89 LOW TESTOSTERONE: Primary | ICD-10-CM

## 2021-09-03 PROCEDURE — 96372 THER/PROPH/DIAG INJ SC/IM: CPT | Performed by: UROLOGY

## 2021-09-03 RX ORDER — TESTOSTERONE CYPIONATE 200 MG/ML
200 INJECTION INTRAMUSCULAR ONCE
Status: COMPLETED | OUTPATIENT
Start: 2021-09-03 | End: 2021-09-03

## 2021-09-03 RX ADMIN — TESTOSTERONE CYPIONATE 200 MG: 200 INJECTION INTRAMUSCULAR at 08:02

## 2021-09-03 NOTE — PROGRESS NOTES
200mg/ml Testosterone Cypionate administered with 22G 1.5 inch needle. Patient has given me verbal consent to perform Testosterone Injection. yes     Following Dr. Sd Bailey plan of care  Testosterone 200mg/ml GIVEN I.M. left UOQ hip- administered 200 ml of testosterone to patient  Lot Number: [id number]  [facility] #: [id number]  Exp-11/2023     Patient supplied his own medications YES, he did supply medication. Patient to return in 2 weeks for next injection.

## 2021-09-17 ENCOUNTER — NURSE ONLY (OUTPATIENT)
Dept: UROLOGY | Age: 61
End: 2021-09-17
Payer: COMMERCIAL

## 2021-09-17 DIAGNOSIS — E34.9 TESTOSTERONE DEFICIENCY: Primary | ICD-10-CM

## 2021-09-17 PROCEDURE — 96372 THER/PROPH/DIAG INJ SC/IM: CPT | Performed by: NURSE PRACTITIONER

## 2021-09-17 RX ORDER — TESTOSTERONE CYPIONATE 200 MG/ML
200 INJECTION INTRAMUSCULAR ONCE
Status: COMPLETED | OUTPATIENT
Start: 2021-09-17 | End: 2021-09-17

## 2021-09-17 RX ADMIN — TESTOSTERONE CYPIONATE 200 MG: 200 INJECTION INTRAMUSCULAR at 08:03

## 2021-09-17 NOTE — PROGRESS NOTES
200mg/ml Testosterone Cypionate administered with 22 G 1.5 inch needle. Patient has given me verbal consent to perform Testosterone Injection. Yes     Following Erick Booth CNP plan of care  Testosterone 200mg/ml GIVEN I.M. right UOQ hip- administered 200 ml of testosterone to patient  Lot Number: [id number]  [facility] #: [id number]  Exp-11/2023     Patient supplied his own medications Yes     Patient to return in 2 weeks for next injection.

## 2021-10-01 ENCOUNTER — NURSE ONLY (OUTPATIENT)
Dept: UROLOGY | Age: 61
End: 2021-10-01
Payer: COMMERCIAL

## 2021-10-01 DIAGNOSIS — E34.9 TESTOSTERONE DEFICIENCY: ICD-10-CM

## 2021-10-01 PROCEDURE — 96372 THER/PROPH/DIAG INJ SC/IM: CPT | Performed by: NURSE PRACTITIONER

## 2021-10-01 RX ORDER — TESTOSTERONE CYPIONATE 200 MG/ML
200 INJECTION INTRAMUSCULAR ONCE
Status: COMPLETED | OUTPATIENT
Start: 2021-10-01 | End: 2021-10-01

## 2021-10-01 RX ADMIN — TESTOSTERONE CYPIONATE 200 MG: 200 INJECTION INTRAMUSCULAR at 07:55

## 2021-10-01 NOTE — PROGRESS NOTES
200mg/ml Testosterone Cypionate administered with 22 G 1.5 inch needle. Patient has given me verbal consent to perform Testosterone Injection. yes     Following Dr. Lynette Hammonds plan of care  Testosterone 200mg/ml GIVEN I.M. left UOQ hip- administered 1 ml of testosterone to patient  Lot Number: [id number]  [facility] #: [id number]  Exp-11/01/2023     Patient supplied his own medications yes     Patient to return in 2 weeks for next injection.

## 2021-10-15 ENCOUNTER — NURSE ONLY (OUTPATIENT)
Dept: UROLOGY | Age: 61
End: 2021-10-15
Payer: COMMERCIAL

## 2021-10-15 DIAGNOSIS — E34.9 TESTOSTERONE DEFICIENCY: Primary | ICD-10-CM

## 2021-10-15 PROCEDURE — 96372 THER/PROPH/DIAG INJ SC/IM: CPT | Performed by: NURSE PRACTITIONER

## 2021-10-15 RX ORDER — TESTOSTERONE CYPIONATE 200 MG/ML
200 INJECTION INTRAMUSCULAR ONCE
Status: COMPLETED | OUTPATIENT
Start: 2021-10-15 | End: 2021-10-15

## 2021-10-15 RX ADMIN — TESTOSTERONE CYPIONATE 200 MG: 200 INJECTION INTRAMUSCULAR at 07:58

## 2021-10-29 ENCOUNTER — NURSE ONLY (OUTPATIENT)
Dept: UROLOGY | Age: 61
End: 2021-10-29
Payer: COMMERCIAL

## 2021-10-29 DIAGNOSIS — E34.9 TESTOSTERONE DEFICIENCY: Primary | ICD-10-CM

## 2021-10-29 PROCEDURE — 96372 THER/PROPH/DIAG INJ SC/IM: CPT | Performed by: NURSE PRACTITIONER

## 2021-10-29 RX ORDER — TESTOSTERONE CYPIONATE 200 MG/ML
200 INJECTION INTRAMUSCULAR ONCE
Status: COMPLETED | OUTPATIENT
Start: 2021-10-29 | End: 2021-10-29

## 2021-10-29 RX ADMIN — TESTOSTERONE CYPIONATE 200 MG: 200 INJECTION INTRAMUSCULAR at 07:49

## 2021-10-29 NOTE — PROGRESS NOTES
200mg/ml Testosterone Cypionate administered with 22 G 1 inch needle. Patient has given me verbal consent to perform Testosterone Injection. Yes     Following Dr. Vicky Jones plan of care  Testosterone 200mg/ml GIVEN I.M. Left UOQ hip- administered 1 ml of testosterone to patient  Lot Number: [id number]  [facility] #: [id number]  Exp-03/2024     Patient supplied his own medications  Yes     Patient to return in 2 weeks for next injection.

## 2021-11-12 ENCOUNTER — NURSE ONLY (OUTPATIENT)
Dept: UROLOGY | Age: 61
End: 2021-11-12
Payer: COMMERCIAL

## 2021-11-12 DIAGNOSIS — E29.1 HYPOGONADISM MALE: ICD-10-CM

## 2021-11-12 PROCEDURE — 96372 THER/PROPH/DIAG INJ SC/IM: CPT | Performed by: NURSE PRACTITIONER

## 2021-11-12 RX ORDER — TESTOSTERONE CYPIONATE 200 MG/ML
200 INJECTION INTRAMUSCULAR ONCE
Status: COMPLETED | OUTPATIENT
Start: 2021-11-12 | End: 2021-11-12

## 2021-11-12 RX ORDER — TESTOSTERONE CYPIONATE 100 MG/ML
200 INJECTION, SOLUTION INTRAMUSCULAR ONCE
Status: SHIPPED | OUTPATIENT
Start: 2021-11-12

## 2021-11-12 RX ADMIN — TESTOSTERONE CYPIONATE 200 MG: 200 INJECTION INTRAMUSCULAR at 08:08

## 2021-11-12 NOTE — PROGRESS NOTES
200mg/ml Testosterone Cypionate administered with 22 G 1 inch needle. Patient has given me verbal consent to perform Testosterone Injection. Yes     Following Dr. Lia Rodriguez plan of care  Testosterone 200mg/ml GIVEN I.M. Right UOQ hip- administered 1 ml of testosterone to patient  Lot Number: [id number]  [address] #: [id number]  Exp-03/2024     Patient supplied his own medications  Yes     Patient to return in 2 weeks for next injection.

## 2021-11-29 ENCOUNTER — NURSE ONLY (OUTPATIENT)
Dept: UROLOGY | Age: 61
End: 2021-11-29
Payer: COMMERCIAL

## 2021-11-29 DIAGNOSIS — E34.9 TESTOSTERONE DEFICIENCY: Primary | ICD-10-CM

## 2021-11-29 PROCEDURE — 96372 THER/PROPH/DIAG INJ SC/IM: CPT | Performed by: NURSE PRACTITIONER

## 2021-11-29 RX ORDER — TESTOSTERONE CYPIONATE 200 MG/ML
200 INJECTION INTRAMUSCULAR ONCE
Status: COMPLETED | OUTPATIENT
Start: 2021-11-29 | End: 2021-11-29

## 2021-11-29 RX ADMIN — TESTOSTERONE CYPIONATE 200 MG: 200 INJECTION INTRAMUSCULAR at 07:53

## 2021-11-29 NOTE — PROGRESS NOTES
200mg/ml Testosterone Cypionate administered with 22 G  inch needle. Patient has given me verbal consent to perform Testosterone Injection. Yes     Following Aniket Mcdonough CNP/Dr Conrad Walker plan of care  Testosterone 200mg/ml GIVEN I.M. left UOQ hip- administered 1 ml of testosterone to patient  Lot Number: [id number]  [address] #: [id number]  Exp-03/2024     Patient supplied his own medications  Yes     Patient to return in 2 weeks for next injection.

## 2021-12-13 ENCOUNTER — NURSE ONLY (OUTPATIENT)
Dept: UROLOGY | Age: 61
End: 2021-12-13
Payer: COMMERCIAL

## 2021-12-13 DIAGNOSIS — E29.1 HYPOGONADISM IN MALE: ICD-10-CM

## 2021-12-13 PROCEDURE — 96372 THER/PROPH/DIAG INJ SC/IM: CPT | Performed by: UROLOGY

## 2021-12-13 RX ORDER — TESTOSTERONE CYPIONATE 200 MG/ML
200 INJECTION INTRAMUSCULAR ONCE
Status: COMPLETED | OUTPATIENT
Start: 2021-12-13 | End: 2021-12-13

## 2021-12-13 RX ADMIN — TESTOSTERONE CYPIONATE 200 MG: 200 INJECTION INTRAMUSCULAR at 07:59

## 2021-12-13 NOTE — PROGRESS NOTES
200mg/ml Testosterone Cypionate administered with 22 G 1 inch needle. Patient has given me verbal consent to perform Testosterone Injection. Yes     Following Dr. Michael Thomas plan of care  Testosterone 200mg/ml GIVEN I.M. Right UOQ hip- administered 1 ml of testosterone to patient  Lot Number: [id number]  [address] #: [id number]  Exp-03/2024     Patient supplied his own medications  Yes     Patient to return in 2 weeks for next injection.

## 2021-12-27 ENCOUNTER — NURSE ONLY (OUTPATIENT)
Dept: UROLOGY | Age: 61
End: 2021-12-27
Payer: COMMERCIAL

## 2021-12-27 DIAGNOSIS — E29.1 HYPOGONADISM IN MALE: Primary | ICD-10-CM

## 2021-12-27 PROCEDURE — 96372 THER/PROPH/DIAG INJ SC/IM: CPT | Performed by: UROLOGY

## 2021-12-27 RX ORDER — TESTOSTERONE CYPIONATE 200 MG/ML
200 INJECTION INTRAMUSCULAR ONCE
Status: COMPLETED | OUTPATIENT
Start: 2021-12-27 | End: 2021-12-27

## 2021-12-27 RX ADMIN — TESTOSTERONE CYPIONATE 200 MG: 200 INJECTION INTRAMUSCULAR at 08:47

## 2021-12-27 NOTE — PROGRESS NOTES
200mg/ml Testosterone Cypionate administered with 22 G 1 inch needle. Patient has given me verbal consent to perform Testosterone Injection. Yes     Following Dr. Eve Iyer plan of care  Testosterone 200mg/ml GIVEN I.M. Left UOQ hip- administered 1 ml of testosterone to patient  Lot Number: [id number]  [address] #: [id number]  Exp-03/2024     Patient supplied his own medications  Yes     Patient to return in 2 weeks for next injection.

## 2022-01-14 ENCOUNTER — TELEPHONE (OUTPATIENT)
Dept: UROLOGY | Age: 62
End: 2022-01-14

## 2022-01-14 ENCOUNTER — NURSE ONLY (OUTPATIENT)
Dept: UROLOGY | Age: 62
End: 2022-01-14
Payer: COMMERCIAL

## 2022-01-14 DIAGNOSIS — E29.1 HYPOGONADISM IN MALE: ICD-10-CM

## 2022-01-14 PROCEDURE — 96372 THER/PROPH/DIAG INJ SC/IM: CPT | Performed by: UROLOGY

## 2022-01-14 RX ORDER — TESTOSTERONE CYPIONATE 200 MG/ML
200 INJECTION INTRAMUSCULAR ONCE
Status: COMPLETED | OUTPATIENT
Start: 2022-01-14 | End: 2022-01-14

## 2022-01-14 RX ORDER — TESTOSTERONE CYPIONATE 200 MG/ML
200 INJECTION INTRAMUSCULAR
Qty: 12 ML | Refills: 0 | Status: SHIPPED | OUTPATIENT
Start: 2022-01-14 | End: 2022-09-13 | Stop reason: SDUPTHER

## 2022-01-14 RX ADMIN — TESTOSTERONE CYPIONATE 200 MG: 200 INJECTION INTRAMUSCULAR at 08:53

## 2022-01-14 NOTE — PROGRESS NOTES
200mg/ml Testosterone Cypionate administered with 22 G 1 inch needle. Patient has given me verbal consent to perform Testosterone Injection. Yes     Following Dr. Clarke Espinal plan of care  Testosterone 200mg/ml GIVEN I.M. Right UOQ hip- administered 1 ml of testosterone to patient  Lot Number: [id number]  [facility] #: [id number]  Exp-03/2024     Patient supplied his own medications  Yes     Patient to return in 2 weeks for next injection. Pt states he needs refill on testosterone. Sent refill to Dr Jay Jay Jorge. Also sent perfectserve letting him know it is in his patient calls.

## 2022-01-14 NOTE — TELEPHONE ENCOUNTER
Pt here today for testosterone injection. States he needs refill as he gave me his last vial today for his injection. Follow up with Dr Srikanth Delgadillo not until 2-8-22 and lab work due then. Please send refill of testosterone to pharmacy. Thanks!

## 2022-01-28 ENCOUNTER — NURSE ONLY (OUTPATIENT)
Dept: UROLOGY | Age: 62
End: 2022-01-28
Payer: COMMERCIAL

## 2022-01-28 DIAGNOSIS — E29.1 HYPOGONADISM IN MALE: Primary | ICD-10-CM

## 2022-01-28 PROCEDURE — 96372 THER/PROPH/DIAG INJ SC/IM: CPT | Performed by: NURSE PRACTITIONER

## 2022-01-28 RX ORDER — TESTOSTERONE CYPIONATE 200 MG/ML
200 INJECTION INTRAMUSCULAR ONCE
Status: COMPLETED | OUTPATIENT
Start: 2022-01-28 | End: 2022-01-28

## 2022-01-28 RX ADMIN — TESTOSTERONE CYPIONATE 200 MG: 200 INJECTION INTRAMUSCULAR at 08:57

## 2022-01-28 NOTE — PROGRESS NOTES
200mg/ml Testosterone Cypionate administered with 22 G 1 inch needle.      Patient has given me verbal consent to perform Testosterone Injection. Yes     Following Ibrahima Edgar CNP plan of care  Testosterone 200mg/ml GIVEN I. M. Left UOQ hip- administered 1 ml of testosterone to patient  Lot Number: [id number]  [address] #: [id number]  Exp-06/2024     Patient supplied his own medications  Yes     Patient to return in 2 weeks for next injection and ov with Dr. Isma Saez

## 2022-02-07 ENCOUNTER — NURSE ONLY (OUTPATIENT)
Dept: LAB | Age: 62
End: 2022-02-07

## 2022-02-07 DIAGNOSIS — E29.1 HYPOGONADISM IN MALE: ICD-10-CM

## 2022-02-07 LAB
ALBUMIN SERPL-MCNC: 4.9 G/DL (ref 3.5–5.1)
ALP BLD-CCNC: 55 U/L (ref 38–126)
ALT SERPL-CCNC: 33 U/L (ref 11–66)
AST SERPL-CCNC: 27 U/L (ref 5–40)
BILIRUB SERPL-MCNC: 0.6 MG/DL (ref 0.3–1.2)
BILIRUBIN DIRECT: < 0.2 MG/DL (ref 0–0.3)
ERYTHROCYTE [DISTWIDTH] IN BLOOD BY AUTOMATED COUNT: 13.8 % (ref 11.5–14.5)
ERYTHROCYTE [DISTWIDTH] IN BLOOD BY AUTOMATED COUNT: 45.1 FL (ref 35–45)
HCT VFR BLD CALC: 48 % (ref 42–52)
HEMOGLOBIN: 16 GM/DL (ref 14–18)
MCH RBC QN AUTO: 29.7 PG (ref 26–33)
MCHC RBC AUTO-ENTMCNC: 33.3 GM/DL (ref 32.2–35.5)
MCV RBC AUTO: 89.2 FL (ref 80–94)
PLATELET # BLD: 295 THOU/MM3 (ref 130–400)
PMV BLD AUTO: 9.8 FL (ref 9.4–12.4)
PROSTATE SPECIFIC ANTIGEN: 0.4 NG/ML (ref 0–1)
RBC # BLD: 5.38 MILL/MM3 (ref 4.7–6.1)
TOTAL PROTEIN: 7.2 G/DL (ref 6.1–8)
WBC # BLD: 5.9 THOU/MM3 (ref 4.8–10.8)

## 2022-02-08 ENCOUNTER — OFFICE VISIT (OUTPATIENT)
Dept: UROLOGY | Age: 62
End: 2022-02-08
Payer: COMMERCIAL

## 2022-02-08 VITALS
BODY MASS INDEX: 31.99 KG/M2 | HEIGHT: 69 IN | WEIGHT: 216 LBS | DIASTOLIC BLOOD PRESSURE: 78 MMHG | SYSTOLIC BLOOD PRESSURE: 142 MMHG

## 2022-02-08 DIAGNOSIS — E34.9 TESTOSTERONE DEFICIENCY: ICD-10-CM

## 2022-02-08 DIAGNOSIS — Z12.5 SCREENING PSA (PROSTATE SPECIFIC ANTIGEN): ICD-10-CM

## 2022-02-08 DIAGNOSIS — E29.1 HYPOGONADISM IN MALE: Primary | ICD-10-CM

## 2022-02-08 PROCEDURE — 3017F COLORECTAL CA SCREEN DOC REV: CPT | Performed by: UROLOGY

## 2022-02-08 PROCEDURE — 99214 OFFICE O/P EST MOD 30 MIN: CPT | Performed by: UROLOGY

## 2022-02-08 PROCEDURE — G8417 CALC BMI ABV UP PARAM F/U: HCPCS | Performed by: UROLOGY

## 2022-02-08 PROCEDURE — G8427 DOCREV CUR MEDS BY ELIG CLIN: HCPCS | Performed by: UROLOGY

## 2022-02-08 PROCEDURE — G8484 FLU IMMUNIZE NO ADMIN: HCPCS | Performed by: UROLOGY

## 2022-02-08 PROCEDURE — 1036F TOBACCO NON-USER: CPT | Performed by: UROLOGY

## 2022-02-08 RX ORDER — SILDENAFIL 100 MG/1
100 TABLET, FILM COATED ORAL DAILY PRN
Qty: 30 TABLET | Refills: 3 | Status: SHIPPED | OUTPATIENT
Start: 2022-02-08

## 2022-02-08 RX ORDER — TESTOSTERONE CYPIONATE 200 MG/ML
200 INJECTION INTRAMUSCULAR
Qty: 12 ML | Refills: 0 | Status: SHIPPED | OUTPATIENT
Start: 2022-02-08 | End: 2022-04-26 | Stop reason: SDUPTHER

## 2022-02-08 NOTE — PROGRESS NOTES
RIVKA Chun MD        [address] [facility] [address]  Dept: [phone]  Dept Fax: [phone]: [address] [address] Urology Office Note -     Patient:  Lydia Palomares  YOB: 1960    The patient is a 57 y.o. male who presents today for evaluation of the following problems:   Chief Complaint   Patient presents with    Hypogonadism     all labs prior, Testosterone still in progess        HISTORY OF PRESENT ILLNESS:     Hypogonadism  On trt injections every two weeks  Some new issues with libido    Rising PSA  stable    ED  New problem  Hard time maintaining    Summary of Previous Records:  69-year-old white male comes in to discuss some concerns. He is on TRT with excellent serum levels of testosterone in the high 800 NG/DL range. He was concerned about weight gain but frankly I think he has gained some significant lean body muscle mass. He has been placed on increased doses of BuSpar, clomipramine and is now on amlodipine. I told him in general antidepressants can cause a change in libido. Amlodipine is a 1-2% reported adverse effect of sexual dysfunction in men. Overall, he is somewhat satisfied with the TRT regimen. He is due for hemoglobin/hematocrit at the end of March. 200 mg testosterone cypionate IM today. I will call him with his lab results at the end of March.         Requested/reviewed records from Vivien Encarnacion MD office and/or outside physician/EMR    (Patient's old records have been requested, reviewed and pertinent findings summarized in today's note.)    Procedures Today: N/A    Last several PSA's:  Lab Results   Component Value Date    PSA 0.40 02/07/2022    PSA 0.97 07/29/2021    PSA 0.10 08/07/2020       Last total testosterone:  Lab Results   Component Value Date    TESTOSTERONE 894 (H) 02/18/2021       Urinalysis today:  No results found for this visit on 02/08/22. Last BUN and creatinine:  Lab Results   Component Value Date    BUN 15 05/28/2021     Lab Results   Component Value Date    CREATININE 1.0 05/28/2021       Imaging Reviewed during this Office Visit:   [place] MD independently reviewed the images and verified the radiology reports from:    No results found. PAST MEDICAL, FAMILY AND SOCIAL HISTORY:  Past Medical History:   Diagnosis Date    Depression     Genital herpes     GERD (gastroesophageal reflux disease)     Hematospermia     Hyperlipidemia     Hypertension     OCD (obsessive compulsive disorder)     UTI (urinary tract infection)      Past Surgical History:   Procedure Laterality Date    ENDOSCOPY, COLON, DIAGNOSTIC      SHOULDER SURGERY Left 11/18/2019    Dr. Blanca Thapa Right 12/2019     Family History   Problem Relation Age of Onset    Cancer Father         Lung    Heart Attack Paternal Grandfather      No outpatient medications have been marked as taking for the 2/8/22 encounter (Office Visit) with Theo Pacheco MD.       Patient has no known allergies. Social History     Tobacco Use   Smoking Status Never Smoker   Smokeless Tobacco Never Used      (If patient a smoker, smoking cessation counseling offered)   Social History     Substance and Sexual Activity   Alcohol Use No       REVIEW OF SYSTEMS:  Constitutional: negative  Eyes: negative  Respiratory: negative  Cardiovascular: negative  Gastrointestinal: negative  Genitourinary: see HPI  Musculoskeletal: negative  Skin: negative   Neurological: negative  Hematological/Lymphatic: negative  Psychological: negative      Physical Exam:    This a 57 y.o. male  Vitals:    02/08/22 1132   BP: (!) 142/78     Body mass index is 31.9 kg/m². Constitutional: Patient in no acute distress;       Assessment and Plan        1. Hypogonadism in male    2. Testosterone deficiency    3.  Screening PSA (prostate specific antigen)               Plan:       Worsening ED- start sildenafil PRN  Cbc, psa, lft, testosterone in six months  Will follow rising psa- new problem. Reassured patient  Discussed trt risks etc    Getting injections in office  Labs in six months for trt toxicity and ED check  Refilled trt        Prescriptions Ordered:  No orders of the defined types were placed in this encounter. Orders Placed:  No orders of the defined types were placed in this encounter.            Kylah Mckeon MD

## 2022-02-11 ENCOUNTER — NURSE ONLY (OUTPATIENT)
Dept: UROLOGY | Age: 62
End: 2022-02-11
Payer: COMMERCIAL

## 2022-02-11 DIAGNOSIS — E29.1 HYPOGONADISM IN MALE: Primary | ICD-10-CM

## 2022-02-11 LAB — TESTOSTERONE TOTAL: 670 NG/DL (ref 300–720)

## 2022-02-11 PROCEDURE — 96372 THER/PROPH/DIAG INJ SC/IM: CPT | Performed by: NURSE PRACTITIONER

## 2022-02-11 RX ORDER — TESTOSTERONE CYPIONATE 200 MG/ML
200 INJECTION INTRAMUSCULAR ONCE
Status: COMPLETED | OUTPATIENT
Start: 2022-02-11 | End: 2022-02-11

## 2022-02-11 RX ADMIN — TESTOSTERONE CYPIONATE 200 MG: 200 INJECTION INTRAMUSCULAR at 10:15

## 2022-02-11 NOTE — PROGRESS NOTES
200mg/ml Testosterone Cypionate administered with 22 G 1 inch needle.      Patient has given me verbal consent to perform Testosterone Injection.  Yes     Following [facility]  Testosterone 200mg/ml GIVEN I. M. Right UOQ hip- administered 1 ml of testosterone to patient  Lot Number: [id number]  [facility] #: [id number]  Exp-06/2024     Patient supplied his own medications  Yes

## 2022-02-25 ENCOUNTER — NURSE ONLY (OUTPATIENT)
Dept: UROLOGY | Age: 62
End: 2022-02-25
Payer: COMMERCIAL

## 2022-02-25 DIAGNOSIS — E29.1 HYPOGONADISM IN MALE: Primary | ICD-10-CM

## 2022-02-25 PROCEDURE — 96372 THER/PROPH/DIAG INJ SC/IM: CPT | Performed by: NURSE PRACTITIONER

## 2022-02-25 RX ORDER — TESTOSTERONE CYPIONATE 200 MG/ML
200 INJECTION INTRAMUSCULAR ONCE
Status: COMPLETED | OUTPATIENT
Start: 2022-02-25 | End: 2022-02-25

## 2022-02-25 RX ADMIN — TESTOSTERONE CYPIONATE 200 MG: 200 INJECTION INTRAMUSCULAR at 09:19

## 2022-03-11 ENCOUNTER — NURSE ONLY (OUTPATIENT)
Dept: UROLOGY | Age: 62
End: 2022-03-11
Payer: COMMERCIAL

## 2022-03-11 DIAGNOSIS — E29.1 HYPOGONADISM IN MALE: Primary | ICD-10-CM

## 2022-03-11 PROCEDURE — 96372 THER/PROPH/DIAG INJ SC/IM: CPT | Performed by: UROLOGY

## 2022-03-11 RX ORDER — TESTOSTERONE CYPIONATE 200 MG/ML
200 INJECTION INTRAMUSCULAR ONCE
Status: COMPLETED | OUTPATIENT
Start: 2022-03-11 | End: 2022-03-11

## 2022-03-11 RX ADMIN — TESTOSTERONE CYPIONATE 200 MG: 200 INJECTION INTRAMUSCULAR at 09:05

## 2022-03-11 NOTE — PROGRESS NOTES
200mg/ml Testosterone Cypionate administered with 22 G 1 inch needle.      Patient has given me verbal consent to perform Testosterone Injection.  Yes     Following Dr. Connelly plan of care  Testosterone 200mg/ml GIVEN I. M.RIGHT UOQ hip- administered 1 ml of testosterone to patient  Lot Number: [id number]  [address] #: [id number]  Exp-06/2024     Patient supplied his own medications  Yes

## 2022-03-28 ENCOUNTER — NURSE ONLY (OUTPATIENT)
Dept: UROLOGY | Age: 62
End: 2022-03-28
Payer: COMMERCIAL

## 2022-03-28 DIAGNOSIS — E29.1 HYPOGONADISM IN MALE: Primary | ICD-10-CM

## 2022-03-28 PROCEDURE — 96372 THER/PROPH/DIAG INJ SC/IM: CPT | Performed by: UROLOGY

## 2022-03-28 RX ORDER — TESTOSTERONE CYPIONATE 200 MG/ML
200 INJECTION INTRAMUSCULAR ONCE
Status: COMPLETED | OUTPATIENT
Start: 2022-03-28 | End: 2022-03-28

## 2022-03-28 RX ADMIN — TESTOSTERONE CYPIONATE 200 MG: 200 INJECTION INTRAMUSCULAR at 08:18

## 2022-03-28 NOTE — PROGRESS NOTES
I have personally verified, reviewed, released, signed, authenticated, authorized, confirmed,finalized, and approved the actions of the [facility]. 200mg/ml Testosterone Cypionate administered with 22 G 1 inch needle. Patient has given me verbal consent to perform Testosterone Injection. Yes     Following Dr. Katherine Euceda of care  Testosterone 200mg/ml GIVEN I.M. left UOQ hip- administered 1 ml of testosterone to patient  Lot Number: [id number]  [facility] #: [id number]  Exp-06/2024     Patient supplied his own medications  Yes     Patient to return in 2 weeks for next injection.

## 2022-04-13 ENCOUNTER — NURSE ONLY (OUTPATIENT)
Dept: UROLOGY | Age: 62
End: 2022-04-13
Payer: COMMERCIAL

## 2022-04-13 DIAGNOSIS — E29.1 HYPOGONADISM IN MALE: ICD-10-CM

## 2022-04-13 PROCEDURE — 96372 THER/PROPH/DIAG INJ SC/IM: CPT | Performed by: NURSE PRACTITIONER

## 2022-04-13 RX ORDER — TESTOSTERONE CYPIONATE 200 MG/ML
200 INJECTION INTRAMUSCULAR ONCE
Status: COMPLETED | OUTPATIENT
Start: 2022-04-13 | End: 2022-04-13

## 2022-04-13 RX ADMIN — TESTOSTERONE CYPIONATE 200 MG: 200 INJECTION INTRAMUSCULAR at 08:23

## 2022-04-13 NOTE — PROGRESS NOTES
200mg/ml Testosterone Cypionate administered with 22 G 1 inch needle. Patient has given me verbal consent to perform Testosterone Injection. Yes     Following Sydney Gamino CNP plan of care  Testosterone 200mg/ml GIVEN I.M. left UOQ hip- administered 1 ml of testosterone to patient  Lot Number: [id number]  [facility] #: [id number]  Exp-06/2004     Patient supplied his own medications  Yes     Patient to return in 2 weeks for next injection.

## 2022-04-26 ENCOUNTER — NURSE ONLY (OUTPATIENT)
Dept: UROLOGY | Age: 62
End: 2022-04-26
Payer: COMMERCIAL

## 2022-04-26 DIAGNOSIS — E29.1 HYPOGONADISM IN MALE: ICD-10-CM

## 2022-04-26 PROCEDURE — 96372 THER/PROPH/DIAG INJ SC/IM: CPT | Performed by: UROLOGY

## 2022-04-26 RX ORDER — TESTOSTERONE CYPIONATE 200 MG/ML
200 INJECTION INTRAMUSCULAR
Qty: 1 ML | Refills: 5 | Status: SHIPPED | OUTPATIENT
Start: 2022-04-26 | End: 2022-05-10

## 2022-04-26 RX ORDER — TESTOSTERONE CYPIONATE 200 MG/ML
200 INJECTION INTRAMUSCULAR ONCE
Status: COMPLETED | OUTPATIENT
Start: 2022-04-26 | End: 2022-04-26

## 2022-04-26 RX ORDER — TESTOSTERONE CYPIONATE 200 MG/ML
200 INJECTION INTRAMUSCULAR
Qty: 1 ML | Refills: 5 | Status: CANCELLED | OUTPATIENT
Start: 2022-04-26 | End: 2022-10-23

## 2022-04-26 RX ADMIN — TESTOSTERONE CYPIONATE 200 MG: 200 INJECTION INTRAMUSCULAR at 07:45

## 2022-04-26 NOTE — PROGRESS NOTES
200mg/ml Testosterone Cypionate administered with 22 G 1 inch needle. Patient has given me verbal consent to perform Testosterone Injection. Yes     Following Dr. Ethyl Sever plan of care  Testosterone 200mg/ml GIVEN I.M. Right UOQ hip- administered 1 ml of testosterone to patient  Lot Number: [id number]  [facility] #: [id number]  Exp-06/2024     Patient supplied his own medications  Yes     Patient to return in 2 weeks for next injection. Pt need refill on Testosterone. Pended script and Dr Kenny Potts will sign.

## 2022-04-27 ENCOUNTER — OFFICE VISIT (OUTPATIENT)
Dept: FAMILY MEDICINE CLINIC | Age: 62
End: 2022-04-27
Payer: COMMERCIAL

## 2022-04-27 VITALS
DIASTOLIC BLOOD PRESSURE: 80 MMHG | SYSTOLIC BLOOD PRESSURE: 124 MMHG | TEMPERATURE: 98.2 F | HEART RATE: 72 BPM | WEIGHT: 208.8 LBS | BODY MASS INDEX: 30.83 KG/M2 | RESPIRATION RATE: 16 BRPM

## 2022-04-27 DIAGNOSIS — R09.81 SINUS CONGESTION: ICD-10-CM

## 2022-04-27 DIAGNOSIS — R05.9 COUGH: Primary | ICD-10-CM

## 2022-04-27 LAB
Lab: NORMAL
QC PASS/FAIL: NORMAL
SARS-COV-2 RDRP RESP QL NAA+PROBE: NEGATIVE

## 2022-04-27 PROCEDURE — 1036F TOBACCO NON-USER: CPT | Performed by: NURSE PRACTITIONER

## 2022-04-27 PROCEDURE — 3017F COLORECTAL CA SCREEN DOC REV: CPT | Performed by: NURSE PRACTITIONER

## 2022-04-27 PROCEDURE — G8427 DOCREV CUR MEDS BY ELIG CLIN: HCPCS | Performed by: NURSE PRACTITIONER

## 2022-04-27 PROCEDURE — G8417 CALC BMI ABV UP PARAM F/U: HCPCS | Performed by: NURSE PRACTITIONER

## 2022-04-27 PROCEDURE — 99213 OFFICE O/P EST LOW 20 MIN: CPT | Performed by: NURSE PRACTITIONER

## 2022-04-27 PROCEDURE — 87635 SARS-COV-2 COVID-19 AMP PRB: CPT | Performed by: NURSE PRACTITIONER

## 2022-04-27 RX ORDER — CEFDINIR 300 MG/1
300 CAPSULE ORAL 2 TIMES DAILY
Qty: 20 CAPSULE | Refills: 0 | Status: SHIPPED | OUTPATIENT
Start: 2022-04-27 | End: 2022-05-07

## 2022-04-27 SDOH — ECONOMIC STABILITY: FOOD INSECURITY: WITHIN THE PAST 12 MONTHS, YOU WORRIED THAT YOUR FOOD WOULD RUN OUT BEFORE YOU GOT MONEY TO BUY MORE.: NEVER TRUE

## 2022-04-27 SDOH — ECONOMIC STABILITY: FOOD INSECURITY: WITHIN THE PAST 12 MONTHS, THE FOOD YOU BOUGHT JUST DIDN'T LAST AND YOU DIDN'T HAVE MONEY TO GET MORE.: NEVER TRUE

## 2022-04-27 ASSESSMENT — PATIENT HEALTH QUESTIONNAIRE - PHQ9
10. IF YOU CHECKED OFF ANY PROBLEMS, HOW DIFFICULT HAVE THESE PROBLEMS MADE IT FOR YOU TO DO YOUR WORK, TAKE CARE OF THINGS AT HOME, OR GET ALONG WITH OTHER PEOPLE: 0
SUM OF ALL RESPONSES TO PHQ QUESTIONS 1-9: 0
7. TROUBLE CONCENTRATING ON THINGS, SUCH AS READING THE NEWSPAPER OR WATCHING TELEVISION: 0
SUM OF ALL RESPONSES TO PHQ9 QUESTIONS 1 & 2: 0
6. FEELING BAD ABOUT YOURSELF - OR THAT YOU ARE A FAILURE OR HAVE LET YOURSELF OR YOUR FAMILY DOWN: 0
SUM OF ALL RESPONSES TO PHQ QUESTIONS 1-9: 0
2. FEELING DOWN, DEPRESSED OR HOPELESS: 0
3. TROUBLE FALLING OR STAYING ASLEEP: 0
1. LITTLE INTEREST OR PLEASURE IN DOING THINGS: 0
8. MOVING OR SPEAKING SO SLOWLY THAT OTHER PEOPLE COULD HAVE NOTICED. OR THE OPPOSITE, BEING SO FIGETY OR RESTLESS THAT YOU HAVE BEEN MOVING AROUND A LOT MORE THAN USUAL: 0
4. FEELING TIRED OR HAVING LITTLE ENERGY: 0
9. THOUGHTS THAT YOU WOULD BE BETTER OFF DEAD, OR OF HURTING YOURSELF: 0
5. POOR APPETITE OR OVEREATING: 0

## 2022-04-27 ASSESSMENT — ENCOUNTER SYMPTOMS
EYES NEGATIVE: 1
VOICE CHANGE: 1
SORE THROAT: 1
COUGH: 1
SHORTNESS OF BREATH: 0
ABDOMINAL PAIN: 0
CHEST TIGHTNESS: 0
BLOOD IN STOOL: 0

## 2022-04-27 ASSESSMENT — SOCIAL DETERMINANTS OF HEALTH (SDOH): HOW HARD IS IT FOR YOU TO PAY FOR THE VERY BASICS LIKE FOOD, HOUSING, MEDICAL CARE, AND HEATING?: NOT HARD AT ALL

## 2022-04-27 NOTE — PROGRESS NOTES
Left ear flush performed per orders of TS with warm water and a cerumen spoon. Moderate amount of wax removed. Tympanic membrane visualized as pearly white and intact. Pt tolerated well.

## 2022-04-27 NOTE — PATIENT INSTRUCTIONS
Patient Education        Upper Respiratory Infection (Cold): Care Instructions  Your Care Instructions     An upper respiratory infection, or URI, is an infection of the nose, sinuses, or throat. URIs are spread by coughs, sneezes, and direct contact. The common cold is the most frequent kind of URI. The flu and sinus infections are otherkinds of URIs. Almost all URIs are caused by viruses. Antibiotics won't cure them. But you can treat most infections with home care. This may include drinking lots of fluids and taking over-the-counter pain medicine. You will probably feel better in 4to 10 days. The doctor has checked you carefully, but problems can develop later. If you notice any problems or new symptoms, get medical treatment right away. Follow-up care is a key part of your treatment and safety. Be sure to make and go to all appointments, and call your doctor if you are having problems. It's also a good idea to know your test results and keep alist of the medicines you take. How can you care for yourself at home?  To prevent dehydration, drink plenty of fluids. Choose water and other clear liquids until you feel better. If you have kidney, heart, or liver disease and have to limit fluids, talk with your doctor before you increase the amount of fluids you drink.  Take an over-the-counter pain medicine, such as acetaminophen (Tylenol), ibuprofen (Advil, Motrin), or naproxen (Aleve). Read and follow all instructions on the label.  Before you use cough and cold medicines, check the label. These medicines may not be safe for young children or for people with certain health problems.  Be careful when taking over-the-counter cold or flu medicines and Tylenol at the same time. Many of these medicines have acetaminophen, which is Tylenol. Read the labels to make sure that you are not taking more than the recommended dose. Too much acetaminophen (Tylenol) can be harmful.    Get plenty of rest.   Do not smoke or allow others to smoke around you. If you need help quitting, talk to your doctor about stop-smoking programs and medicines. These can increase your chances of quitting for good. When should you call for help? Call 911 anytime you think you may need emergency care. For example, call if:     You have severe trouble breathing. Call your doctor now or seek immediate medical care if:     You seem to be getting much sicker.      You have new or worse trouble breathing.      You have a new or higher fever.      You have a new rash. Watch closely for changes in your health, and be sure to contact your doctor if:     You have a new symptom, such as a sore throat, an earache, or sinus pain.      You cough more deeply or more often, especially if you notice more mucus or a change in the color of your mucus.      You do not get better as expected. Where can you learn more? Go to https://[employer] Processpepiceweb.[employer]. org and sign in to your [employer] account. Enter B280 in the [employer] box to learn more about \"Upper Respiratory Infection (Cold): Care Instructions. \"     If you do not have an account, please click on the \"Sign Up Now\" link. Current as of: July 6, 2021               Content Version: 13.2  © 2006-2022 Healthwise, Incorporated. Care instructions adapted under license by [facility] ([facility]). If you have questions about a medical condition or this instruction, always ask your healthcare professional. [address] [place] [address] any warranty or liability for your use of this information.

## 2022-04-27 NOTE — PROGRESS NOTES
Chief Complaint   Patient presents with    Other     Pt c/o sinus problem going on. Pt has a cough and sore throat. Pt states he does not have any congestion just sinus pressure. SUBJECTIVE     Dina Britt is a 57 y.o.male      Pt complains of sore throat, cough, voice changes starting around 4 days ago. He is feeling worse as the days go on and having trouble sleeping. Denies body aches, fever, headache. He has not been taking anything otc currently. He did do a VV with telehealth provider and was recommended a face-to-face appt with PCP to get checked for covid. Review of Systems   Constitutional: Positive for fatigue. Negative for chills, fever and unexpected weight change. HENT: Positive for congestion, sore throat and voice change. Eyes: Negative. Respiratory: Positive for cough. Negative for chest tightness and shortness of breath. Cardiovascular: Negative for chest pain, palpitations and leg swelling. Gastrointestinal: Negative for abdominal pain and blood in stool. Genitourinary: Negative for dysuria. Musculoskeletal: Negative for joint swelling. Skin: Negative for rash. Neurological: Negative for dizziness. Psychiatric/Behavioral: Negative. All other systems reviewed and are negative. OBJECTIVE     /80   Pulse 72   Temp 98.2 °F (36.8 °C) (Oral)   Resp 16   Wt 208 lb 12.8 oz (94.7 kg)   BMI 30.83 kg/m²     Physical Exam  Vitals and nursing note reviewed. Constitutional:       Appearance: He is well-developed. He is ill-appearing. HENT:      Head: Normocephalic and atraumatic. Right Ear: External ear normal.      Left Ear: External ear normal. There is impacted cerumen. Nose: Rhinorrhea present. Mouth/Throat:      Pharynx: Posterior oropharyngeal erythema (mild) present. Eyes:      Conjunctiva/sclera: Conjunctivae normal.      Pupils: Pupils are equal, round, and reactive to light.    Cardiovascular:      Rate and Rhythm: Normal rate and regular rhythm. Pulmonary:      Effort: Pulmonary effort is normal.      Breath sounds: Normal breath sounds. Abdominal:      General: Bowel sounds are normal.      Palpations: Abdomen is soft. Musculoskeletal:         General: Normal range of motion. Cervical back: Normal range of motion and neck supple. Skin:     General: Skin is warm and dry. Neurological:      Mental Status: He is alert and oriented to person, place, and time. Deep Tendon Reflexes: Reflexes are normal and symmetric. Psychiatric:         Behavior: Behavior normal.         Thought Content: Thought content normal.         Judgment: Judgment normal.           No results found for this visit on 04/27/22. ASSESSMENT       Diagnosis Orders   1. Cough  POCT COVID-19 Rapid, NAAT   2. Sinus congestion  POCT COVID-19 Rapid, NAAT       PLAN     Requested Prescriptions     Signed Prescriptions Disp Refills    cefdinir (OMNICEF) 300 MG capsule 20 capsule 0     Sig: Take 1 capsule by mouth 2 times daily for 10 days         Orders Placed This Encounter   Procedures    POCT COVID-19 Rapid, NAAT     Order Specific Question:   Is this test for diagnosis or screening? Answer:   Diagnosis of ill patient     Order Specific Question:   Symptomatic for COVID-19 as defined by CDC? Answer:   Yes     Order Specific Question:   Date of Symptom Onset     Answer:   4/24/2022     Order Specific Question:   Hospitalized for COVID-19? Answer:   No     Order Specific Question:   Admitted to ICU for COVID-19? Answer:   No     Order Specific Question:   Employed in healthcare setting? Answer:   No     Order Specific Question:   Resident in a congregate (group) care setting? Answer:   No     Order Specific Question:   Pregnant: Answer:   No     Order Specific Question:   Previously tested for COVID-19? Answer:   No       Covid is negative  Left ear irrigated  Pt requests antibiotic at this time.  After discussion, omnicef sent to pharmacy  Follow up as needed          Electronically signed by JENNY Haney CNP on 4/27/2022 at 4:28 PM

## 2022-05-10 ENCOUNTER — NURSE ONLY (OUTPATIENT)
Dept: UROLOGY | Age: 62
End: 2022-05-10
Payer: COMMERCIAL

## 2022-05-10 ENCOUNTER — TELEPHONE (OUTPATIENT)
Dept: UROLOGY | Age: 62
End: 2022-05-10

## 2022-05-10 DIAGNOSIS — E29.1 HYPOGONADISM IN MALE: ICD-10-CM

## 2022-05-10 DIAGNOSIS — E29.1 HYPOGONADISM IN MALE: Primary | ICD-10-CM

## 2022-05-10 PROCEDURE — 96372 THER/PROPH/DIAG INJ SC/IM: CPT | Performed by: UROLOGY

## 2022-05-10 RX ORDER — TESTOSTERONE CYPIONATE 200 MG/ML
200 INJECTION INTRAMUSCULAR
Qty: 12 ML | Refills: 0 | Status: SHIPPED | OUTPATIENT
Start: 2022-05-10 | End: 2022-06-20 | Stop reason: SDUPTHER

## 2022-05-10 RX ORDER — TESTOSTERONE CYPIONATE 200 MG/ML
200 INJECTION INTRAMUSCULAR ONCE
Status: COMPLETED | OUTPATIENT
Start: 2022-05-10 | End: 2022-05-10

## 2022-05-10 RX ADMIN — TESTOSTERONE CYPIONATE 200 MG: 200 INJECTION INTRAMUSCULAR at 07:47

## 2022-05-10 NOTE — PROGRESS NOTES
200mg/ml Testosterone Cypionate administered with 22 G 1 inch needle.      Patient has given me verbal consent to perform Testosterone Injection. Yes     Following Dr. Nickie Johns of care  Testosterone 200mg/ml GIVEN I.M.  Left UOQ hip- administered 1 ml of testosterone to patient  Lot Number: [id number]  [address] #: [id number]  Exp-10/2024     Patient supplied his own medications  Yes     Patient to return in 2 weeks for next injection.

## 2022-05-25 ENCOUNTER — NURSE ONLY (OUTPATIENT)
Dept: UROLOGY | Age: 62
End: 2022-05-25
Payer: COMMERCIAL

## 2022-05-25 DIAGNOSIS — E29.1 HYPOGONADISM IN MALE: Primary | ICD-10-CM

## 2022-05-25 PROCEDURE — 96372 THER/PROPH/DIAG INJ SC/IM: CPT | Performed by: NURSE PRACTITIONER

## 2022-05-25 RX ORDER — TESTOSTERONE CYPIONATE 200 MG/ML
200 INJECTION INTRAMUSCULAR ONCE
Status: COMPLETED | OUTPATIENT
Start: 2022-05-25 | End: 2022-05-25

## 2022-05-25 RX ADMIN — TESTOSTERONE CYPIONATE 200 MG: 200 INJECTION INTRAMUSCULAR at 09:56

## 2022-05-25 NOTE — PROGRESS NOTES
200mg/ml Testosterone Cypionate administered with 22 G 1 inch needle.      Patient has given me verbal consent to perform Testosterone Injection.  Yes     Following [place] of care  Testosterone 200mg/ml GIVEN I.M.  Right UOQ hip- administered 1 ml of testosterone to patient  Lot Number: [id number]  [address] #: [id number]  Exp-09/2024     Patient supplied his own medications  Yes     Patient to return in 2 weeks for next injection.

## 2022-06-10 ENCOUNTER — NURSE ONLY (OUTPATIENT)
Dept: UROLOGY | Age: 62
End: 2022-06-10
Payer: COMMERCIAL

## 2022-06-10 DIAGNOSIS — E29.1 HYPOGONADISM IN MALE: Primary | ICD-10-CM

## 2022-06-10 PROCEDURE — 96372 THER/PROPH/DIAG INJ SC/IM: CPT | Performed by: UROLOGY

## 2022-06-10 RX ORDER — TESTOSTERONE CYPIONATE 200 MG/ML
200 INJECTION INTRAMUSCULAR ONCE
Status: COMPLETED | OUTPATIENT
Start: 2022-06-10 | End: 2022-06-10

## 2022-06-10 RX ADMIN — TESTOSTERONE CYPIONATE 200 MG: 200 INJECTION INTRAMUSCULAR at 08:57

## 2022-06-10 NOTE — PROGRESS NOTES
200mg/ml Testosterone Cypionate administered with 22 G 1 inch needle.      Patient has given me verbal consent to perform Testosterone Injection.  Yes     Following Dr. Fuentes Neither of care  Testosterone 200mg/ml GIVEN I.M. Left UOQ hip- administered 1 ml of testosterone to patient  Lot Number: [id number]  [facility] #: [id number]  Exp-09/2024     Patient supplied his own medications  Yes     Patient to return in 2 weeks for next injection.

## 2022-06-13 ENCOUNTER — TELEPHONE (OUTPATIENT)
Dept: FAMILY MEDICINE CLINIC | Age: 62
End: 2022-06-13

## 2022-06-13 DIAGNOSIS — E78.1 HYPERTRIGLYCERIDEMIA: ICD-10-CM

## 2022-06-13 DIAGNOSIS — I10 PRIMARY HYPERTENSION: Primary | ICD-10-CM

## 2022-06-13 DIAGNOSIS — R73.01 IFG (IMPAIRED FASTING GLUCOSE): ICD-10-CM

## 2022-06-13 NOTE — TELEPHONE ENCOUNTER
----- Message from Jose Alfredo Cerrato sent at 6/13/2022 10:09 AM EDT -----  Subject: Message to Provider    QUESTIONS  Information for Provider? Patient is scheduled to come in for his annual   work physical on Monday 6/20 but needs his orders printed out for him to    asap. Can we please get his lab orders in place and printed. Please contact the patient once this is completed and let him know that he   can come in to  the orders. ---------------------------------------------------------------------------  --------------  Joi Rivera INFO  What is the best way for the office to contact you? OK to leave message on   voicemail  Preferred Call Back Phone Number? [phone]  ---------------------------------------------------------------------------  --------------  SCRIPT ANSWERS  Relationship to Patient?  Self

## 2022-06-13 NOTE — TELEPHONE ENCOUNTER
Noted, order placed.  -WS    Orders Placed This Encounter   Procedures    Comprehensive Metabolic Panel     Standing Status:   Future     Standing Expiration Date:   6/13/2023    Lipid Panel     Standing Status:   Future     Standing Expiration Date:   6/13/2023     Order Specific Question:   Is Patient Fasting?/# of Hours     Answer:   12    Hemoglobin A1C     Standing Status:   Future     Standing Expiration Date:   6/13/2023

## 2022-06-16 ENCOUNTER — NURSE ONLY (OUTPATIENT)
Dept: LAB | Age: 62
End: 2022-06-16

## 2022-06-16 DIAGNOSIS — I10 PRIMARY HYPERTENSION: ICD-10-CM

## 2022-06-16 DIAGNOSIS — R73.01 IFG (IMPAIRED FASTING GLUCOSE): ICD-10-CM

## 2022-06-16 DIAGNOSIS — E78.1 HYPERTRIGLYCERIDEMIA: ICD-10-CM

## 2022-06-16 LAB
ALBUMIN SERPL-MCNC: 5 G/DL (ref 3.5–5.1)
ALP BLD-CCNC: 48 U/L (ref 38–126)
ALT SERPL-CCNC: 30 U/L (ref 11–66)
ANION GAP SERPL CALCULATED.3IONS-SCNC: 13 MEQ/L (ref 8–16)
AST SERPL-CCNC: 30 U/L (ref 5–40)
AVERAGE GLUCOSE: 120 MG/DL (ref 70–126)
BILIRUB SERPL-MCNC: 0.7 MG/DL (ref 0.3–1.2)
BUN BLDV-MCNC: 15 MG/DL (ref 7–22)
CALCIUM SERPL-MCNC: 10 MG/DL (ref 8.5–10.5)
CHLORIDE BLD-SCNC: 94 MEQ/L (ref 98–111)
CHOLESTEROL, TOTAL: 210 MG/DL (ref 100–199)
CO2: 25 MEQ/L (ref 23–33)
CREAT SERPL-MCNC: 1 MG/DL (ref 0.4–1.2)
GFR SERPL CREATININE-BSD FRML MDRD: 76 ML/MIN/1.73M2
GLUCOSE BLD-MCNC: 117 MG/DL (ref 70–108)
HBA1C MFR BLD: 6 % (ref 4.4–6.4)
HDLC SERPL-MCNC: 29 MG/DL
LDL CHOLESTEROL CALCULATED: 135 MG/DL
POTASSIUM SERPL-SCNC: 4.4 MEQ/L (ref 3.5–5.2)
SODIUM BLD-SCNC: 132 MEQ/L (ref 135–145)
TOTAL PROTEIN: 7 G/DL (ref 6.1–8)
TRIGL SERPL-MCNC: 231 MG/DL (ref 0–199)

## 2022-06-19 NOTE — PATIENT INSTRUCTIONS
Patient Education        Well Visit, Men 48 to 72: Care Instructions  Overview     Well visits can help you stay healthy. Your doctor has checked your overall health and may have suggested ways to take good care of yourself. Your doctor also may have recommended tests. At home, you can help prevent illness withhealthy eating, regular exercise, and other steps. Follow-up care is a key part of your treatment and safety. Be sure to make and go to all appointments, and call your doctor if you are having problems. It's also a good idea to know your test results and keep alist of the medicines you take. How can you care for yourself at home?  Get screening tests that you and your doctor decide on. Screening helps find diseases before any symptoms appear.  Eat healthy foods. Choose fruits, vegetables, whole grains, protein, and low-fat dairy foods. Limit fat, especially saturated fat. Reduce salt in your diet.  Limit alcohol. Have no more than 2 drinks a day or 14 drinks a week.  Get at least 30 minutes of exercise on most days of the week. Walking is a good choice. You also may want to do other activities, such as running, swimming, cycling, or playing tennis or team sports.  Reach and stay at a healthy weight. This will lower your risk for many problems, such as obesity, diabetes, heart disease, and high blood pressure.  Do not smoke. Smoking can make health problems worse. If you need help quitting, talk to your doctor about stop-smoking programs and medicines. These can increase your chances of quitting for good.  Care for your mental health. It is easy to get weighed down by worry and stress. Learn strategies to manage stress, like deep breathing and mindfulness, and stay connected with your family and community. If you find you often feel sad or hopeless, talk with your doctor. Treatment can help.    Talk to your doctor about whether you have any risk factors for sexually transmitted infections (STIs). You can help prevent STIs if you wait to have sex with a new partner (or partners) until you've each been tested for STIs. It also helps if you use condoms (male or female condoms) and if you limit your sex partners to one person who only has sex with you. Vaccines are available for some STIs.  If it's important to you to prevent pregnancy with your partner, talk with your doctor about birth control options that might be best for you.  If you think you may have a problem with alcohol or drug use, talk to your doctor. This includes prescription medicines (such as amphetamines and opioids) and illegal drugs (such as cocaine and methamphetamine). Your doctor can help you figure out what type of treatment is best for you.  Protect your skin from too much sun. When you're outdoors from 10 a.m. to 4 p.m., stay in the shade or cover up with clothing and a hat with a wide brim. Wear sunglasses that block UV rays. Even when it's cloudy, put broad-spectrum sunscreen (SPF 30 or higher) on any exposed skin.  See a dentist one or two times a year for checkups and to have your teeth cleaned.  Wear a seat belt in the car. When should you call for help? Watch closely for changes in your health, and be sure to contact your doctor if you have any problems or symptoms that concern you. Where can you learn more? Go to https://Trip4realjemimaeb.health-partners. org and sign in to your [employer] account. Enter L700 in the [facility] box to learn more about \"Well Visit, Men 48 to 72: Care Instructions. \"     If you do not have an account, please click on the \"Sign Up Now\" link. Current as of: October 6, 2021               Content Version: 13.2  © 2006-2022 Healthwise, Incorporated. Care instructions adapted under license by [facility] ([facility]).  If you have questions about a medical condition or this instruction, always ask your healthcare professional. Mandy Crow any warranty or liability for your use of this information.

## 2022-06-20 ENCOUNTER — OFFICE VISIT (OUTPATIENT)
Dept: FAMILY MEDICINE CLINIC | Age: 62
End: 2022-06-20
Payer: COMMERCIAL

## 2022-06-20 VITALS
SYSTOLIC BLOOD PRESSURE: 130 MMHG | TEMPERATURE: 98 F | HEIGHT: 69 IN | BODY MASS INDEX: 30.66 KG/M2 | RESPIRATION RATE: 16 BRPM | WEIGHT: 207 LBS | DIASTOLIC BLOOD PRESSURE: 84 MMHG | HEART RATE: 64 BPM

## 2022-06-20 DIAGNOSIS — K21.9 GASTROESOPHAGEAL REFLUX DISEASE, UNSPECIFIED WHETHER ESOPHAGITIS PRESENT: ICD-10-CM

## 2022-06-20 DIAGNOSIS — Z00.00 ANNUAL PHYSICAL EXAM: Primary | ICD-10-CM

## 2022-06-20 DIAGNOSIS — B00.9 HERPES INFECTION: ICD-10-CM

## 2022-06-20 DIAGNOSIS — I10 ESSENTIAL HYPERTENSION: ICD-10-CM

## 2022-06-20 PROCEDURE — 99396 PREV VISIT EST AGE 40-64: CPT | Performed by: FAMILY MEDICINE

## 2022-06-20 RX ORDER — VALACYCLOVIR HYDROCHLORIDE 500 MG/1
500 TABLET, FILM COATED ORAL DAILY
Qty: 90 TABLET | Refills: 3 | Status: SHIPPED | OUTPATIENT
Start: 2022-06-20

## 2022-06-20 RX ORDER — ESOMEPRAZOLE MAGNESIUM 40 MG/1
40 CAPSULE, DELAYED RELEASE ORAL
Qty: 90 CAPSULE | Refills: 3 | Status: SHIPPED | OUTPATIENT
Start: 2022-06-20

## 2022-06-20 RX ORDER — METOPROLOL SUCCINATE 100 MG/1
100 TABLET, EXTENDED RELEASE ORAL DAILY
Qty: 90 TABLET | Refills: 3 | Status: SHIPPED | OUTPATIENT
Start: 2022-06-20

## 2022-06-20 RX ORDER — AMLODIPINE BESYLATE 5 MG/1
5 TABLET ORAL DAILY
Qty: 90 TABLET | Refills: 3 | Status: SHIPPED | OUTPATIENT
Start: 2022-06-20

## 2022-06-20 RX ORDER — VALSARTAN AND HYDROCHLOROTHIAZIDE 320; 25 MG/1; MG/1
1 TABLET, FILM COATED ORAL DAILY
Qty: 90 TABLET | Refills: 3 | Status: SHIPPED | OUTPATIENT
Start: 2022-06-20

## 2022-06-20 ASSESSMENT — ENCOUNTER SYMPTOMS
BLOOD IN STOOL: 0
EYES NEGATIVE: 1
ABDOMINAL PAIN: 0
SHORTNESS OF BREATH: 0
CHEST TIGHTNESS: 0

## 2022-06-20 NOTE — PROGRESS NOTES
2022    Chief Complaint   Patient presents with    Annual Exam     Here today for annual exam and medication refills.  Dizziness     C/O lightheadedness when going from sitting to standing off/on. Yaima hSrestha (:  1960) is a 57 y.o. male, here for a preventive medicine evaluation. Patient Active Problem List   Diagnosis    HTN (hypertension)    Hypertriglyceridemia    GERD (gastroesophageal reflux disease)    Depression    Chronic prostatitis    Erectile dysfunction    Herpes infection    IFG (impaired fasting glucose)     Patient reports he is doing well in general.  He does note some transient dizziness with change of position, especially during hot weather. Symptoms only last a couple seconds and resolve. Blood pressures have been stable. He takes his prescribed medications as directed and denies side effects. No recent illnesses or hospitalizations. He continues to see urology on a consistent basis. He walks 2 miles per day for exercise. He brings wellness forms for his insurance for us to complete today. No changes in family history. Non-smoker. BMI 30.57. Review of Systems   Constitutional: Negative for chills, fatigue, fever and unexpected weight change. HENT: Negative. Eyes: Negative. Respiratory: Negative for chest tightness and shortness of breath. Cardiovascular: Negative for chest pain, palpitations and leg swelling. Gastrointestinal: Negative for abdominal pain and blood in stool. Genitourinary: Negative for difficulty urinating and dysuria. Musculoskeletal: Negative for joint swelling. Skin: Negative for rash. Neurological: Positive for light-headedness. Negative for dizziness and headaches. Psychiatric/Behavioral: Negative. All other systems reviewed and are negative. Prior to Visit Medications    Medication Sig Taking?  Authorizing Provider   metoprolol succinate (TOPROL XL) 100 MG extended release tablet Take 1 tablet by mouth daily Yes Polly Wan MD   esomeprazole (NEXIUM) 40 MG delayed release capsule Take 1 capsule by mouth every morning (before breakfast) Yes Polly Wan MD   valACYclovir (VALTREX) 500 MG tablet Take 1 tablet by mouth daily Yes Polly Wan MD   amLODIPine (NORVASC) 5 MG tablet Take 1 tablet by mouth daily Yes Polly Wan MD   valsartan-hydroCHLOROthiazide (DIOVAN HCT) 320-25 MG per tablet Take 1 tablet by mouth daily Yes Polly Wan MD   sildenafil (VIAGRA) 100 MG tablet Take 1 tablet by mouth daily as needed for Erectile Dysfunction Yes Familia Tinoco MD   testosterone cypionate (DEPOTESTOTERONE CYPIONATE) 200 MG/ML injection Inject 1 mL into the muscle every 14 days for 12 doses.  Yes Familia Tinoco MD   sertraline (ZOLOFT) 100 MG tablet Take 100 mg by mouth daily  Yes Historical Provider, MD   ARIPiprazole (ABILIFY) 2 MG tablet Take 2 mg by mouth daily  Yes Historical Provider, MD   ibuprofen (ADVIL;MOTRIN) 200 MG tablet Take 200 mg by mouth every 6 hours as needed for Pain Yes Historical Provider, MD   clomiPRAMINE (ANAFRANIL) 50 MG capsule 4 tabs at bedtime prn Yes Polly Wan MD   Multiple Vitamin (MULTIVITAMIN PO) Take by mouth daily  Yes Historical Provider, MD        No Known Allergies    Past Medical History:   Diagnosis Date    Depression     Genital herpes     GERD (gastroesophageal reflux disease)     Hematospermia     Hyperlipidemia     Hypertension     OCD (obsessive compulsive disorder)     UTI (urinary tract infection)        Past Surgical History:   Procedure Laterality Date    ENDOSCOPY, COLON, DIAGNOSTIC      SHOULDER SURGERY Left 11/18/2019    Dr. Franklyn Holder Right 12/2019       Social History     Socioeconomic History    Marital status:      Spouse name: Not on file    Number of children: Not on file    Years of education: Not on file    Highest education level: Not on file   Occupational History    Not on file   Tobacco Use    Smoking status: Never Smoker    Smokeless tobacco: Never Used   Substance and Sexual Activity    Alcohol use: No    Drug use: Not on file    Sexual activity: Not on file   Other Topics Concern    Not on file   Social History Narrative    Not on file     Social Determinants of Health     Financial Resource Strain: Low Risk     Difficulty of Paying Living Expenses: Not hard at all   Food Insecurity: No Food Insecurity    Worried About [address] in the Last Year: Never true    [address] [facility] [address] in the Last Year: Never true   Transportation Needs:     Lack of Transportation (Medical): Not on file    Lack of Transportation (Non-Medical):  Not on file   Physical Activity:     Days of Exercise per Week: Not on file    Minutes of Exercise per Session: Not on file   Stress:     Feeling of Stress : Not on file   Social Connections:     Frequency of Communication with Friends and Family: Not on file    Frequency of Social Gatherings with Friends and Family: Not on file    Attends Jew Services: Not on file    Active Member of [address] [place] [address] or Organizations: Not on file    Attends Club or Organization Meetings: Not on file    Marital Status: Not on file   Intimate Partner Violence:     Fear of Current or Ex-Partner: Not on file    Emotionally Abused: Not on file    Physically Abused: Not on file    Sexually Abused: Not on file   Housing Stability:     Unable to Pay for Housing in the Last Year: Not on file    Number of [place] in the Last Year: Not on file    Unstable Housing in the Last Year: Not on file        Family History   Problem Relation Age of Onset    Cancer Father         Lung    Heart Attack Paternal Grandfather        ADVANCE DIRECTIVE: N, <no information>    Vitals:    06/20/22 1431   BP: 130/84   Site: Left Upper Arm   Pulse: 64   Resp: 16   Temp: 98 °F (36.7 °C)   TempSrc: Oral   Weight: 207 lb (93.9 kg)   Height: 5' 9\" (1.753 m)     Estimated body mass index is 30.57 kg/m² as calculated from the following:    Height as of this encounter: 5' 9\" (1.753 m). Weight as of this encounter: 207 lb (93.9 kg). Physical Exam  Vitals reviewed. Constitutional:       General: He is not in acute distress. Appearance: He is well-developed. HENT:      Head: Normocephalic and atraumatic. Right Ear: Tympanic membrane normal.      Left Ear: Tympanic membrane normal.      Mouth/Throat:      Mouth: Mucous membranes are moist.      Pharynx: No posterior oropharyngeal erythema. Eyes:      Conjunctiva/sclera: Conjunctivae normal.   Neck:      Thyroid: No thyromegaly. Vascular: No carotid bruit. Cardiovascular:      Rate and Rhythm: Normal rate and regular rhythm. Heart sounds: No murmur heard. Pulmonary:      Effort: Pulmonary effort is normal.      Breath sounds: Normal breath sounds. No wheezing. Abdominal:      General: Bowel sounds are normal.      Palpations: Abdomen is soft. Tenderness: There is no abdominal tenderness. Musculoskeletal:      Cervical back: Neck supple. Right lower leg: No edema. Left lower leg: No edema. Lymphadenopathy:      Cervical: No cervical adenopathy. Skin:     General: Skin is warm and dry. Findings: No rash. Neurological:      Mental Status: He is alert and oriented to person, place, and time.    Psychiatric:         Behavior: Behavior normal.         Component      Latest Ref Rng & Units 6/16/2022   GLUCOSE, FASTING,GF      70 - 108 mg/dL 117 (H)   Creatinine      0.4 - 1.2 mg/dL 1.0   BUN,BUNPL      7 - 22 mg/dL 15   Sodium      135 - 145 meq/L 132 (L)   Potassium      3.5 - 5.2 meq/L 4.4   Chloride      98 - 111 meq/L 94 (L)   CO2      23 - 33 meq/L 25   CALCIUM, SERUM, 759618      8.5 - 10.5 mg/dL 10.0   AST      5 - 40 U/L 30   Alk Phos      38 - 126 U/L 48   Total Protein      6.1 - 8.0 g/dL 7.0   Albumin      3.5 - 5.1 g/dL 5.0   Bilirubin      0.3 - 1.2 mg/dL 0.7   ALT 11 - 66 U/L 30   CHOLESTEROL, TOTAL, 755132      100 - 199 mg/dL 210 (H)   Triglycerides      0 - 199 mg/dL 231 (H)   HDL Cholesterol      mg/dL 29   LDL Calculated      mg/dL 135   Hemoglobin A1C      4.4 - 6.4 % 6.0   AVERAGE GLUCOSE      70 - 126 mg/dL 120   Anion Gap      8.0 - 16.0 meq/L 13.0   Est, Glom Filt Rate      ml/min/1.73m2 76 (A)       Immunization History   Administered Date(s) Administered    COVID-19, Moderna, Primary or Immunocompromised, PF, 100mcg/0.5mL 03/09/2021, 04/06/2021, 10/25/2021    Influenza Virus Vaccine 10/01/2013, 11/09/2015, 11/15/2016, 11/03/2020, 10/24/2021    Influenza Whole 11/01/2011    Influenza, Quadv, IM, PF (6 mo and older Fluzone, Flulaval, Fluarix, and 3 yrs and older Afluria) 11/09/2018, 10/25/2019    Tdap (Boostrix, Adacel) 08/13/2013       Health Maintenance   Topic Date Due    Shingles vaccine (1 of 2) Never done    Prostate Specific Antigen (PSA) Screening or Monitoring  02/07/2023    Depression Monitoring  04/27/2023    A1C test (Diabetic or Prediabetic)  06/16/2023    DTaP/Tdap/Td vaccine (2 - Td or Tdap) 08/13/2023    Lipids  06/16/2027    Colorectal Cancer Screen  09/20/2029    Flu vaccine  Completed    COVID-19 Vaccine  Completed    Hepatitis A vaccine  Aged Out    Hepatitis B vaccine  Aged Out    Hib vaccine  Aged Out    Meningococcal (ACWY) vaccine  Aged Out    Pneumococcal 0-64 years Vaccine  Aged Out    Hepatitis C screen  Discontinued    HIV screen  Discontinued       Assessment & Plan     1. Annual physical exam  2. Essential hypertension  -     metoprolol succinate (TOPROL XL) 100 MG extended release tablet; Take 1 tablet by mouth daily, Disp-90 tablet, R-3Normal  -     amLODIPine (NORVASC) 5 MG tablet; Take 1 tablet by mouth daily, Disp-90 tablet, R-3**Patient requests 90 days supply**Normal  -     valsartan-hydroCHLOROthiazide (DIOVAN HCT) 320-25 MG per tablet; Take 1 tablet by mouth daily, Disp-90 tablet, R-3Normal  3. Gastroesophageal reflux disease, unspecified whether esophagitis present  -     esomeprazole (NEXIUM) 40 MG delayed release capsule; Take 1 capsule by mouth every morning (before breakfast), Disp-90 capsule, R-3Normal  4. Herpes infection  -     valACYclovir (VALTREX) 500 MG tablet; Take 1 tablet by mouth daily, Disp-90 tablet, R-3Normal    Patient will continue his current medications. His hypertension, GERD, and herpes simplex symptoms are controlled. Push fluids and ensure adequate hydration to prevent transient dizziness    Continue to work on a low-fat diet and increased exercise to reduce weight    Shingrix suggested    Follow-up with specialists as planned    He has a mild, chronic hyponatremia that has been stable over time. We will continue to monitor.     Wellness forms completed for his insurance today    Follow-up yearly and as needed         --Malgorzata Arechiga MD

## 2022-06-24 ENCOUNTER — NURSE ONLY (OUTPATIENT)
Dept: UROLOGY | Age: 62
End: 2022-06-24
Payer: COMMERCIAL

## 2022-06-24 DIAGNOSIS — E29.1 HYPOGONADISM IN MALE: Primary | ICD-10-CM

## 2022-06-24 PROCEDURE — 96372 THER/PROPH/DIAG INJ SC/IM: CPT | Performed by: UROLOGY

## 2022-06-24 RX ORDER — TESTOSTERONE CYPIONATE 200 MG/ML
200 INJECTION INTRAMUSCULAR ONCE
Status: COMPLETED | OUTPATIENT
Start: 2022-06-24 | End: 2022-06-24

## 2022-06-24 RX ADMIN — TESTOSTERONE CYPIONATE 200 MG: 200 INJECTION INTRAMUSCULAR at 07:48

## 2022-06-24 NOTE — PROGRESS NOTES
200mg/ml Testosterone Cypionate administered with 22 G 1 inch needle.      Patient has given me verbal consent to perform Testosterone Injection.  Yes     Scarlett Novel of care  Testosterone 200mg/ml GIVEN I.M. Left UOQ hip- administered 1 ml of testosterone to patient  Lot Number: [id number]  [address] #: [id number]  Exp-09/2024     Patient supplied his own medications  Yes     Patient to return in 2 weeks for next injection

## 2022-07-08 ENCOUNTER — NURSE ONLY (OUTPATIENT)
Dept: UROLOGY | Age: 62
End: 2022-07-08
Payer: COMMERCIAL

## 2022-07-08 DIAGNOSIS — E29.1 HYPOGONADISM IN MALE: Primary | ICD-10-CM

## 2022-07-08 PROCEDURE — 96372 THER/PROPH/DIAG INJ SC/IM: CPT | Performed by: UROLOGY

## 2022-07-08 RX ORDER — TESTOSTERONE CYPIONATE 200 MG/ML
200 INJECTION INTRAMUSCULAR ONCE
Status: COMPLETED | OUTPATIENT
Start: 2022-07-08 | End: 2022-07-08

## 2022-07-08 RX ADMIN — TESTOSTERONE CYPIONATE 200 MG: 200 INJECTION INTRAMUSCULAR at 09:07

## 2022-07-08 NOTE — PROGRESS NOTES
200mg/ml Testosterone Cypionate administered with 22 G 1 inch needle.      Patient has given me verbal consent to perform Testosterone Injection.  Yes     Toney Elicia of care  Testosterone 200mg/ml GIVEN I. M.Rt UOQ hip- administered 1 ml of testosterone to patient  Lot Number: [id number]  [address] #: [id number]  Exp-09/2024     Patient supplied his own medications  Yes     Patient to return in 2 weeks for next injection

## 2022-07-16 DIAGNOSIS — B00.9 HERPES INFECTION: ICD-10-CM

## 2022-07-18 RX ORDER — VALACYCLOVIR HYDROCHLORIDE 500 MG/1
500 TABLET, FILM COATED ORAL DAILY
Qty: 90 TABLET | Refills: 3 | OUTPATIENT
Start: 2022-07-18

## 2022-07-22 ENCOUNTER — NURSE ONLY (OUTPATIENT)
Dept: UROLOGY | Age: 62
End: 2022-07-22
Payer: COMMERCIAL

## 2022-07-22 DIAGNOSIS — E29.1 HYPOGONADISM IN MALE: Primary | ICD-10-CM

## 2022-07-22 DIAGNOSIS — E34.9 TESTOSTERONE DEFICIENCY: ICD-10-CM

## 2022-07-22 PROCEDURE — 96372 THER/PROPH/DIAG INJ SC/IM: CPT | Performed by: UROLOGY

## 2022-07-22 RX ORDER — TESTOSTERONE CYPIONATE 200 MG/ML
200 INJECTION INTRAMUSCULAR ONCE
Status: COMPLETED | OUTPATIENT
Start: 2022-07-22 | End: 2022-07-22

## 2022-07-22 RX ADMIN — TESTOSTERONE CYPIONATE 200 MG: 200 INJECTION INTRAMUSCULAR at 09:08

## 2022-07-22 NOTE — PROGRESS NOTES
200mg/ml Testosterone Cypionate administered with 22 G 1 inch needle. Patient has given me verbal consent to perform Testosterone Injection. Yes     Following Dr. Michael Macario plan of care  Testosterone 200mg/ml GIVEN I. M.Lt UOQ hip- administered 1 ml of testosterone to patient  Lot Number: [id number]  [facility] #: [id number]  Exp-09/2024     Patient supplied his own medications  Yes     Patient to return in 2 weeks for next injection with Dr. Erin Aguilar and office visit with labs.

## 2022-07-29 ENCOUNTER — NURSE ONLY (OUTPATIENT)
Dept: LAB | Age: 62
End: 2022-07-29

## 2022-07-29 DIAGNOSIS — E29.1 HYPOGONADISM IN MALE: ICD-10-CM

## 2022-07-29 LAB
ALBUMIN SERPL-MCNC: 4.8 G/DL (ref 3.5–5.1)
ALP BLD-CCNC: 52 U/L (ref 38–126)
ALT SERPL-CCNC: 37 U/L (ref 11–66)
AST SERPL-CCNC: 27 U/L (ref 5–40)
BILIRUB SERPL-MCNC: 0.5 MG/DL (ref 0.3–1.2)
BILIRUBIN DIRECT: < 0.2 MG/DL (ref 0–0.3)
HCT VFR BLD CALC: 47.1 % (ref 42–52)
HEMOGLOBIN: 15.7 GM/DL (ref 14–18)
PROSTATE SPECIFIC ANTIGEN: 0.4 NG/ML (ref 0–1)
TOTAL PROTEIN: 7 G/DL (ref 6.1–8)

## 2022-07-31 LAB — TESTOSTERONE TOTAL: 757 NG/DL (ref 300–720)

## 2022-08-09 ENCOUNTER — OFFICE VISIT (OUTPATIENT)
Dept: UROLOGY | Age: 62
End: 2022-08-09
Payer: COMMERCIAL

## 2022-08-09 VITALS
BODY MASS INDEX: 31.1 KG/M2 | HEIGHT: 69 IN | DIASTOLIC BLOOD PRESSURE: 78 MMHG | WEIGHT: 210 LBS | SYSTOLIC BLOOD PRESSURE: 138 MMHG

## 2022-08-09 DIAGNOSIS — E29.1 HYPOGONADISM IN MALE: Primary | ICD-10-CM

## 2022-08-09 PROCEDURE — 99214 OFFICE O/P EST MOD 30 MIN: CPT | Performed by: UROLOGY

## 2022-08-09 PROCEDURE — 96372 THER/PROPH/DIAG INJ SC/IM: CPT | Performed by: UROLOGY

## 2022-08-09 PROCEDURE — 1036F TOBACCO NON-USER: CPT | Performed by: UROLOGY

## 2022-08-09 PROCEDURE — G8417 CALC BMI ABV UP PARAM F/U: HCPCS | Performed by: UROLOGY

## 2022-08-09 PROCEDURE — G8427 DOCREV CUR MEDS BY ELIG CLIN: HCPCS | Performed by: UROLOGY

## 2022-08-09 PROCEDURE — 3017F COLORECTAL CA SCREEN DOC REV: CPT | Performed by: UROLOGY

## 2022-08-09 RX ORDER — TESTOSTERONE CYPIONATE 200 MG/ML
200 INJECTION INTRAMUSCULAR ONCE
Status: COMPLETED | OUTPATIENT
Start: 2022-08-09 | End: 2022-08-09

## 2022-08-09 RX ORDER — TESTOSTERONE CYPIONATE 200 MG/ML
200 INJECTION INTRAMUSCULAR
Qty: 12 ML | Refills: 0 | Status: SHIPPED | OUTPATIENT
Start: 2022-08-09 | End: 2023-02-05

## 2022-08-09 RX ADMIN — TESTOSTERONE CYPIONATE 200 MG: 200 INJECTION INTRAMUSCULAR at 09:31

## 2022-08-09 NOTE — PROGRESS NOTES
200mg/ml Testosterone Cypionate administered with 22 G 1 inch needle. Patient has given me verbal consent to perform Testosterone Injection. Yes     Following Dr. Marilu Nelson plan of care  Testosterone 200mg/ml GIVEN I.M.  Right UOQ hip- administered 1 ml of testosterone to patient  Lot Number: [id number]  [address] #: [id number]  Exp-09/2024     Patient supplied his own medications  Yes     Patient to return in 2 weeks for next injection

## 2022-08-09 NOTE — PROGRESS NOTES
RIVKA Weldon MD        [id number] [facility] [id number]  Dept: [phone]  Dept Fax: [phone]: [address] [address] Urology Office Note -     Patient:  Josh Palmer  YOB: 1960    The patient is a 57 y.o. male who presents today for evaluation of the following problems:   Chief Complaint   Patient presents with    Follow-up     Hypogonadism in male- labs prior         HISTORY OF PRESENT ILLNESS:     Hypogonadism  On trt injections every two weeks  No libido issues  Gets injectiosn in office    ED  Has not needed sildenafil   Doing well     Summary of Previous Records:  69-year-old white male comes in to discuss some concerns. He is on TRT with excellent serum levels of testosterone in the high 800 NG/DL range. He was concerned about weight gain but frankly I think he has gained some significant lean body muscle mass. He has been placed on increased doses of BuSpar, clomipramine and is now on amlodipine. I told him in general antidepressants can cause a change in libido. Amlodipine is a 1-2% reported adverse effect of sexual dysfunction in men. Overall, he is somewhat satisfied with the TRT regimen. He is due for hemoglobin/hematocrit at the end of March. 200 mg testosterone cypionate IM today. I will call him with his lab results at the end of March. Requested/reviewed records from Gina Cleary MD office and/or outside physician/EMR    (Patient's old records have been requested, reviewed and pertinent findings summarized in today's note.)    Procedures Today: N/A    Last several PSA's:  Lab Results   Component Value Date    PSA 0.40 07/29/2022    PSA 0.40 02/07/2022    PSA 0.97 07/29/2021       Last total testosterone:  Lab Results   Component Value Date    TESTOSTERONE 757 (H) 07/29/2022       Urinalysis today:  No results found for this visit on 08/09/22.     Last BUN and creatinine:  Lab Results   Component Value Date    BUN 15 06/16/2022     Lab Results   Component Value Date    CREATININE 1.0 06/16/2022       Imaging Reviewed during this Office Visit:   Galina Ellison MD independently reviewed the images and verified the radiology reports from:    No results found.     PAST MEDICAL, FAMILY AND SOCIAL HISTORY:  Past Medical History:   Diagnosis Date    Depression     Genital herpes     GERD (gastroesophageal reflux disease)     Hematospermia     Hyperlipidemia     Hypertension     OCD (obsessive compulsive disorder)     UTI (urinary tract infection)      Past Surgical History:   Procedure Laterality Date    ENDOSCOPY, COLON, DIAGNOSTIC      SHOULDER SURGERY Left 11/18/2019    Dr. Pascual Domingo Right 12/2019     Family History   Problem Relation Age of Onset    Cancer Father         Lung    Heart Attack Paternal Grandfather      Outpatient Medications Marked as Taking for the 8/9/22 encounter (Office Visit) with Bj Lamas MD   Medication Sig Dispense Refill    metoprolol succinate (TOPROL XL) 100 MG extended release tablet Take 1 tablet by mouth daily 90 tablet 3    esomeprazole (NEXIUM) 40 MG delayed release capsule Take 1 capsule by mouth every morning (before breakfast) 90 capsule 3    valACYclovir (VALTREX) 500 MG tablet Take 1 tablet by mouth daily 90 tablet 3    amLODIPine (NORVASC) 5 MG tablet Take 1 tablet by mouth daily 90 tablet 3    valsartan-hydroCHLOROthiazide (DIOVAN HCT) 320-25 MG per tablet Take 1 tablet by mouth daily 90 tablet 3    sildenafil (VIAGRA) 100 MG tablet Take 1 tablet by mouth daily as needed for Erectile Dysfunction 30 tablet 3    sertraline (ZOLOFT) 100 MG tablet Take 100 mg by mouth daily       ARIPiprazole (ABILIFY) 2 MG tablet Take 2 mg by mouth daily       ibuprofen (ADVIL;MOTRIN) 200 MG tablet Take 200 mg by mouth every 6 hours as needed for Pain      clomiPRAMINE (ANAFRANIL) 50 MG capsule 4 tabs at bedtime prn 120 capsule 5 Multiple Vitamin (MULTIVITAMIN PO) Take by mouth daily          Patient has no known allergies. Social History     Tobacco Use   Smoking Status Never   Smokeless Tobacco Never      (If patient a smoker, smoking cessation counseling offered)   Social History     Substance and Sexual Activity   Alcohol Use No       REVIEW OF SYSTEMS:  Constitutional: negative  Eyes: negative  Respiratory: negative  Cardiovascular: negative  Gastrointestinal: negative  Genitourinary: see HPI  Musculoskeletal: negative  Skin: negative   Neurological: negative  Hematological/Lymphatic: negative  Psychological: negative      Physical Exam:    This a 57 y.o. male  Vitals:    08/09/22 0840   BP: 138/78     Body mass index is 31.01 kg/m². Constitutional: Patient in no acute distress;       Assessment and Plan        1. Hypogonadism in male    2. Rising PSA level               Plan:       ED- has not needed sildenafil. Cbc, psa, lft, testosterone in six months  Discussed trt risks etc    Getting injections in office  Labs in six months for trt toxicity and ED check  Refilled trt        Prescriptions Ordered:  No orders of the defined types were placed in this encounter. Orders Placed:  No orders of the defined types were placed in this encounter.            Krystal Dobbins MD

## 2022-08-23 ENCOUNTER — NURSE ONLY (OUTPATIENT)
Dept: UROLOGY | Age: 62
End: 2022-08-23
Payer: COMMERCIAL

## 2022-08-23 DIAGNOSIS — E29.1 HYPOGONADISM IN MALE: Primary | ICD-10-CM

## 2022-08-23 PROCEDURE — 96372 THER/PROPH/DIAG INJ SC/IM: CPT | Performed by: UROLOGY

## 2022-08-23 RX ORDER — TESTOSTERONE CYPIONATE 200 MG/ML
200 INJECTION INTRAMUSCULAR ONCE
Status: COMPLETED | OUTPATIENT
Start: 2022-08-23 | End: 2022-08-23

## 2022-08-23 RX ADMIN — TESTOSTERONE CYPIONATE 200 MG: 200 INJECTION INTRAMUSCULAR at 07:38

## 2022-08-23 NOTE — PROGRESS NOTES
200mg/ml Testosterone Cypionate administered with 22 G 1 inch needle. Patient has given me verbal consent to perform Testosterone Injection. Yes     Following Dr. Marily Hartman plan of care  Testosterone 200mg/ml GIVEN I.M.  Left UOQ hip- administered 1 ml of testosterone to patient  Lot Number: [id number]  [address] #: [id number]  Exp-10/2024     Patient supplied his own medications  Yes     Patient to return in 2 weeks for next injection

## 2022-09-07 ENCOUNTER — NURSE ONLY (OUTPATIENT)
Dept: UROLOGY | Age: 62
End: 2022-09-07
Payer: COMMERCIAL

## 2022-09-07 DIAGNOSIS — E29.1 HYPOGONADISM IN MALE: Primary | ICD-10-CM

## 2022-09-07 DIAGNOSIS — E34.9 TESTOSTERONE DEFICIENCY: ICD-10-CM

## 2022-09-07 PROCEDURE — 96372 THER/PROPH/DIAG INJ SC/IM: CPT | Performed by: NURSE PRACTITIONER

## 2022-09-07 RX ORDER — TESTOSTERONE CYPIONATE 200 MG/ML
200 INJECTION INTRAMUSCULAR ONCE
Status: COMPLETED | OUTPATIENT
Start: 2022-09-07 | End: 2022-09-07

## 2022-09-07 RX ADMIN — TESTOSTERONE CYPIONATE 200 MG: 200 INJECTION INTRAMUSCULAR at 07:44

## 2022-09-07 NOTE — PROGRESS NOTES
200mg/ml Testosterone Cypionate administered with 22 G 1 inch needle. Patient has given me verbal consent to perform Testosterone Injection. Yes     Following Sherman [facility] CNP plan of care  Testosterone 200mg/ml GIVEN I.M.  Right UOQ hip- administered 1 ml of testosterone to patient  Lot Number: [id number]  [address] #: [id number]  Exp-09/2024     Patient supplied his own medications  Yes     Patient to return in 2 weeks for next injection

## 2022-09-13 ENCOUNTER — OFFICE VISIT (OUTPATIENT)
Dept: FAMILY MEDICINE CLINIC | Age: 62
End: 2022-09-13
Payer: COMMERCIAL

## 2022-09-13 VITALS
BODY MASS INDEX: 31.9 KG/M2 | RESPIRATION RATE: 16 BRPM | TEMPERATURE: 98.1 F | DIASTOLIC BLOOD PRESSURE: 70 MMHG | SYSTOLIC BLOOD PRESSURE: 110 MMHG | HEART RATE: 72 BPM | WEIGHT: 216 LBS

## 2022-09-13 DIAGNOSIS — J20.9 ACUTE BRONCHITIS WITH BRONCHOSPASM: Primary | ICD-10-CM

## 2022-09-13 DIAGNOSIS — R05.9 COUGH: ICD-10-CM

## 2022-09-13 DIAGNOSIS — R52 BODY ACHES: ICD-10-CM

## 2022-09-13 LAB
INFLUENZA A ANTIBODY: NORMAL
INFLUENZA B ANTIBODY: NORMAL
Lab: NORMAL
QC PASS/FAIL: NORMAL
SARS-COV-2 RDRP RESP QL NAA+PROBE: NEGATIVE

## 2022-09-13 PROCEDURE — 1036F TOBACCO NON-USER: CPT | Performed by: FAMILY MEDICINE

## 2022-09-13 PROCEDURE — G8417 CALC BMI ABV UP PARAM F/U: HCPCS | Performed by: FAMILY MEDICINE

## 2022-09-13 PROCEDURE — G8427 DOCREV CUR MEDS BY ELIG CLIN: HCPCS | Performed by: FAMILY MEDICINE

## 2022-09-13 PROCEDURE — 87804 INFLUENZA ASSAY W/OPTIC: CPT | Performed by: FAMILY MEDICINE

## 2022-09-13 PROCEDURE — 87635 SARS-COV-2 COVID-19 AMP PRB: CPT | Performed by: FAMILY MEDICINE

## 2022-09-13 PROCEDURE — 3017F COLORECTAL CA SCREEN DOC REV: CPT | Performed by: FAMILY MEDICINE

## 2022-09-13 PROCEDURE — 99213 OFFICE O/P EST LOW 20 MIN: CPT | Performed by: FAMILY MEDICINE

## 2022-09-13 RX ORDER — PREDNISONE 20 MG/1
20 TABLET ORAL 2 TIMES DAILY
Qty: 10 TABLET | Refills: 0 | Status: SHIPPED | OUTPATIENT
Start: 2022-09-13 | End: 2022-09-18

## 2022-09-13 RX ORDER — DOXYCYCLINE HYCLATE 100 MG
100 TABLET ORAL 2 TIMES DAILY
Qty: 20 TABLET | Refills: 0 | Status: SHIPPED | OUTPATIENT
Start: 2022-09-13 | End: 2022-09-23

## 2022-09-13 ASSESSMENT — ENCOUNTER SYMPTOMS
WHEEZING: 1
GASTROINTESTINAL NEGATIVE: 1
SHORTNESS OF BREATH: 0
SORE THROAT: 0
COUGH: 1
CHEST TIGHTNESS: 1

## 2022-09-13 NOTE — PROGRESS NOTES
2022    Judy Salmeron (:  1960) is a 57 y.o. male, Established patient, here for evaluation of the following chief complaint(s):  Cough (C/O deep cough since  with chest congestion, nasal congestion, HA. Had body aches yesterday. /)      ASSESSMENT/PLAN:    1. Acute bronchitis with bronchospasm  -     doxycycline hyclate (VIBRA-TABS) 100 MG tablet; Take 1 tablet by mouth 2 times daily for 10 days, Disp-20 tablet, R-0Normal  -     predniSONE (DELTASONE) 20 MG tablet; Take 1 tablet by mouth 2 times daily for 5 days, Disp-10 tablet, R-0Normal  2. Cough  -     POCT COVID-19 Rapid, NAAT  -     POCT Influenza A/B  3. Body aches  -     POCT COVID-19 Rapid, NAAT  -     POCT Influenza A/B      Treat with doxycycline and prednisone as above    Rest, fluids, OTC meds prn    Follow up if not better      SUBJECTIVE/OBJECTIVE:    HPI    Patient here with a 2-3 day h/o worsening cough, congestion, wheezing, body aches. No fever. Cough is deep and sometimes productive. No shortness of breath. Feels terrible. No nausea, vomiting, diarrhea. No loss of taste or smell. Was exposed to a grandson who had a URI recently. Nonsmoker. Body mass index is 31.9 kg/m². Review of Systems   Constitutional:  Negative for fever. HENT:  Positive for congestion. Negative for ear pain and sore throat. Respiratory:  Positive for cough, chest tightness and wheezing. Negative for shortness of breath. Cardiovascular: Negative. Gastrointestinal: Negative. Genitourinary: Negative. Musculoskeletal:  Positive for arthralgias and myalgias. Neurological:  Negative for dizziness. All other systems reviewed and are negative.         Vitals:    22 0955   BP: 110/70   Site: Right Upper Arm   Cuff Size: Large Adult   Pulse: 72   Resp: 16   Temp: 98.1 °F (36.7 °C)   TempSrc: Oral   Weight: 216 lb (98 kg)       Wt Readings from Last 3 Encounters:   22 216 lb (98 kg)   22 210 lb (95.3 kg) 06/20/22 207 lb (93.9 kg)       BP Readings from Last 3 Encounters:   09/13/22 110/70   08/09/22 138/78   06/20/22 130/84       Physical Exam  Constitutional:       General: He is not in acute distress. Appearance: He is well-developed. HENT:      Head: Normocephalic and atraumatic. Right Ear: Tympanic membrane normal.      Left Ear: Tympanic membrane normal.      Mouth/Throat:      Mouth: Mucous membranes are moist.      Pharynx: No posterior oropharyngeal erythema. Eyes:      Conjunctiva/sclera: Conjunctivae normal.   Cardiovascular:      Rate and Rhythm: Normal rate and regular rhythm. Heart sounds: No murmur heard. Pulmonary:      Breath sounds: Wheezing present. Musculoskeletal:      Right lower leg: No edema. Left lower leg: No edema. Lymphadenopathy:      Cervical: No cervical adenopathy. Neurological:      Mental Status: He is alert. Results for POC orders placed in visit on 09/13/22   POCT Influenza A/B   Result Value Ref Range    Influenza A Ab neg     Influenza B Ab neg        Component      Latest Ref Rng & Units 9/13/2022          10:30 AM   SARS-COV-2, RdRp gene      Negative Negative   Lot Number       [id number]   QC Pass/Fail       pass         An electronic signature was used to authenticate this note.         Electronically signed by Clemencia Bennett MD on 9/13/2022 at 10:59 AM

## 2022-09-22 ENCOUNTER — NURSE ONLY (OUTPATIENT)
Dept: UROLOGY | Age: 62
End: 2022-09-22
Payer: COMMERCIAL

## 2022-09-22 DIAGNOSIS — E29.1 HYPOGONADISM IN MALE: ICD-10-CM

## 2022-09-22 DIAGNOSIS — E34.9 TESTOSTERONE DEFICIENCY: Primary | ICD-10-CM

## 2022-09-22 PROCEDURE — 96372 THER/PROPH/DIAG INJ SC/IM: CPT | Performed by: NURSE PRACTITIONER

## 2022-09-22 RX ORDER — TESTOSTERONE CYPIONATE 200 MG/ML
200 INJECTION INTRAMUSCULAR ONCE
Status: COMPLETED | OUTPATIENT
Start: 2022-09-22 | End: 2022-09-22

## 2022-09-22 RX ADMIN — TESTOSTERONE CYPIONATE 200 MG: 200 INJECTION INTRAMUSCULAR at 07:35

## 2022-10-05 ENCOUNTER — NURSE ONLY (OUTPATIENT)
Dept: UROLOGY | Age: 62
End: 2022-10-05
Payer: COMMERCIAL

## 2022-10-05 DIAGNOSIS — E29.1 HYPOGONADISM IN MALE: Primary | ICD-10-CM

## 2022-10-05 PROCEDURE — 96372 THER/PROPH/DIAG INJ SC/IM: CPT | Performed by: UROLOGY

## 2022-10-05 RX ORDER — TESTOSTERONE CYPIONATE 200 MG/ML
200 INJECTION INTRAMUSCULAR ONCE
Status: COMPLETED | OUTPATIENT
Start: 2022-10-05 | End: 2022-10-05

## 2022-10-05 RX ADMIN — TESTOSTERONE CYPIONATE 200 MG: 200 INJECTION INTRAMUSCULAR at 08:24

## 2022-10-05 NOTE — PROGRESS NOTES
200mg/ml Testosterone Cypionate administered with 22 G 1 inch needle. Patient has given me verbal consent to perform Testosterone Injection. Yes     Following Dr. Alfredo Thornton plan of care  Testosterone 200mg/ml GIVEN I.M. Right UOQ hip- administered 1 ml of testosterone to patient  Lot Number: [id number]  [address] #: [id number]  Exp-09/2024     Patient supplied his own medications  Yes     Patient to return in 2 weeks for next injection.

## 2022-10-19 ENCOUNTER — NURSE ONLY (OUTPATIENT)
Dept: UROLOGY | Age: 62
End: 2022-10-19
Payer: COMMERCIAL

## 2022-10-19 VITALS — BODY MASS INDEX: 31.99 KG/M2 | HEIGHT: 69 IN | WEIGHT: 216 LBS

## 2022-10-19 DIAGNOSIS — E29.1 HYPOGONADISM MALE: Primary | ICD-10-CM

## 2022-10-19 PROCEDURE — 96372 THER/PROPH/DIAG INJ SC/IM: CPT | Performed by: UROLOGY

## 2022-10-19 RX ORDER — TESTOSTERONE CYPIONATE 200 MG/ML
200 INJECTION INTRAMUSCULAR ONCE
Status: COMPLETED | OUTPATIENT
Start: 2022-10-19 | End: 2022-10-19

## 2022-10-19 RX ADMIN — TESTOSTERONE CYPIONATE 200 MG: 200 INJECTION INTRAMUSCULAR at 08:00

## 2022-10-19 NOTE — PROGRESS NOTES
200mg/ml Testosterone Cypionate administered with 22 G 1 inch needle. Patient has given me verbal consent to perform Testosterone Injection. Yes     Following Dr. Gail Velasco plan of care  Testosterone 200mg/ml GIVEN I.M. Left UOQ hip- administered 1 ml of testosterone to patient  Lot Number: [id number]  [address] #: [id number]  Exp-09/2024     Patient supplied his own medications  Yes     Patient to return in 2 weeks for next injection.

## 2022-11-02 ENCOUNTER — NURSE ONLY (OUTPATIENT)
Dept: UROLOGY | Age: 62
End: 2022-11-02
Payer: COMMERCIAL

## 2022-11-02 DIAGNOSIS — E34.9 TESTOSTERONE DEFICIENCY: Primary | ICD-10-CM

## 2022-11-02 PROCEDURE — 96372 THER/PROPH/DIAG INJ SC/IM: CPT | Performed by: UROLOGY

## 2022-11-02 RX ORDER — TESTOSTERONE CYPIONATE 200 MG/ML
200 INJECTION INTRAMUSCULAR ONCE
Status: COMPLETED | OUTPATIENT
Start: 2022-11-02 | End: 2022-11-02

## 2022-11-02 RX ADMIN — TESTOSTERONE CYPIONATE 200 MG: 200 INJECTION INTRAMUSCULAR at 08:00

## 2022-11-02 NOTE — PROGRESS NOTES
200mg/ml Testosterone Cypionate administered with 22 G 1 inch needle. Patient has given me verbal consent to perform Testosterone Injection. Yes     Following Dr. Zoraida Banks plan of care  Testosterone 200mg/ml GIVEN I.M. left UOQ hip- administered 1 ml of testosterone to patient  Lot Number: [id number]  [facility] #: [id number]  Exp-06/2024     Patient supplied his own medications  Yes     Patient to return in 2 weeks for next injection.

## 2022-11-16 ENCOUNTER — NURSE ONLY (OUTPATIENT)
Dept: UROLOGY | Age: 62
End: 2022-11-16
Payer: COMMERCIAL

## 2022-11-16 DIAGNOSIS — E29.1 HYPOGONADISM MALE: Primary | ICD-10-CM

## 2022-11-16 PROCEDURE — 96372 THER/PROPH/DIAG INJ SC/IM: CPT | Performed by: UROLOGY

## 2022-11-16 RX ORDER — TESTOSTERONE CYPIONATE 200 MG/ML
200 INJECTION INTRAMUSCULAR ONCE
Status: COMPLETED | OUTPATIENT
Start: 2022-11-16 | End: 2022-11-16

## 2022-11-16 RX ADMIN — TESTOSTERONE CYPIONATE 200 MG: 200 INJECTION INTRAMUSCULAR at 07:44

## 2022-11-16 NOTE — PROGRESS NOTES
200mg/ml Testosterone Cypionate administered with 22 G 1 inch needle. Patient has given me verbal consent to perform Testosterone Injection. Yes     Following Dr. Lisa Stein of care  Testosterone 200mg/ml GIVEN I.M. Right UOQ hip- administered 1 ml of testosterone to patient  Lot Number: [id number]  [address] #: [id number]  Exp-10/2024     Patient supplied his own medications  Yes     Patient to return in 2 weeks for next injection.

## 2022-11-30 ENCOUNTER — NURSE ONLY (OUTPATIENT)
Dept: UROLOGY | Age: 62
End: 2022-11-30

## 2022-11-30 DIAGNOSIS — R79.89 LOW TESTOSTERONE: ICD-10-CM

## 2022-11-30 DIAGNOSIS — E29.1 HYPOGONADISM IN MALE: Primary | ICD-10-CM

## 2022-11-30 RX ORDER — TESTOSTERONE CYPIONATE 200 MG/ML
200 INJECTION INTRAMUSCULAR ONCE
Status: COMPLETED | OUTPATIENT
Start: 2022-11-30 | End: 2022-11-30

## 2022-11-30 RX ADMIN — TESTOSTERONE CYPIONATE 200 MG: 200 INJECTION INTRAMUSCULAR at 07:46

## 2022-11-30 NOTE — PROGRESS NOTES
200mg/ml Testosterone Cypionate administered with 22 G 1 inch needle. Patient has given me verbal consent to perform Testosterone Injection. Yes     Following Dr. Vora Fruits of care  Testosterone 200mg/ml GIVEN I.M. Left UOQ hip- administered 1 ml of testosterone to patient  Lot Number: [id number]  [address] #: [id number]  Exp-10/2024     Patient supplied his own medications  Yes     Patient to return in 2 weeks for next injection.

## 2022-12-14 ENCOUNTER — NURSE ONLY (OUTPATIENT)
Dept: UROLOGY | Age: 62
End: 2022-12-14
Payer: COMMERCIAL

## 2022-12-14 DIAGNOSIS — E29.1 HYPOGONADISM IN MALE: Primary | ICD-10-CM

## 2022-12-14 PROCEDURE — 96372 THER/PROPH/DIAG INJ SC/IM: CPT | Performed by: NURSE PRACTITIONER

## 2022-12-14 RX ORDER — TESTOSTERONE CYPIONATE 200 MG/ML
200 INJECTION INTRAMUSCULAR ONCE
Status: COMPLETED | OUTPATIENT
Start: 2022-12-14 | End: 2022-12-14

## 2022-12-14 RX ADMIN — TESTOSTERONE CYPIONATE 200 MG: 200 INJECTION INTRAMUSCULAR at 07:51

## 2022-12-14 NOTE — PROGRESS NOTES
200mg/ml Testosterone Cypionate administered with 22 G 1 inch needle. Patient has given me verbal consent to perform Testosterone Injection. Yes     Following JENNY Doshi plan of care  Testosterone 200mg/ml GIVEN I.M. right UOQ hip- administered 1 ml of testosterone to patient  Lot Number: [id number]  [address] #: [id number]  Exp-06/2024     Patient supplied his own medications  Yes     Patient to return in 2 weeks for next injection.

## 2022-12-29 ENCOUNTER — NURSE ONLY (OUTPATIENT)
Dept: UROLOGY | Age: 62
End: 2022-12-29
Payer: COMMERCIAL

## 2022-12-29 DIAGNOSIS — E29.1 HYPOGONADISM IN MALE: Primary | ICD-10-CM

## 2022-12-29 PROCEDURE — 96372 THER/PROPH/DIAG INJ SC/IM: CPT | Performed by: UROLOGY

## 2022-12-29 RX ORDER — TESTOSTERONE CYPIONATE 200 MG/ML
200 INJECTION INTRAMUSCULAR ONCE
Status: COMPLETED | OUTPATIENT
Start: 2022-12-29 | End: 2022-12-29

## 2022-12-29 RX ADMIN — TESTOSTERONE CYPIONATE 200 MG: 200 INJECTION INTRAMUSCULAR at 08:19

## 2022-12-29 NOTE — PROGRESS NOTES
200mg/ml Testosterone Cypionate administered with 22 G 1 inch needle. Patient has given me verbal consent to perform Testosterone Injection. Yes     Following Dr. Veronica Christensen plan of care  Testosterone 200mg/ml GIVEN I.M. Left UOQ hip- administered 1 ml of testosterone to patient  Lot Number: [id number]  [address] #: [id number]  Exp-02/2025     Patient supplied his own medications  Yes     Patient to return in 2 weeks for next injection.

## 2023-01-12 ENCOUNTER — NURSE ONLY (OUTPATIENT)
Dept: UROLOGY | Age: 63
End: 2023-01-12
Payer: COMMERCIAL

## 2023-01-12 PROCEDURE — 96372 THER/PROPH/DIAG INJ SC/IM: CPT | Performed by: UROLOGY

## 2023-01-12 PROCEDURE — 99999 PR OFFICE/OUTPT VISIT,PROCEDURE ONLY: CPT | Performed by: UROLOGY

## 2023-01-12 RX ORDER — TESTOSTERONE CYPIONATE 200 MG/ML
200 INJECTION INTRAMUSCULAR ONCE
Status: COMPLETED | OUTPATIENT
Start: 2023-01-12 | End: 2023-01-12

## 2023-01-12 RX ADMIN — TESTOSTERONE CYPIONATE 200 MG: 200 INJECTION INTRAMUSCULAR at 07:47

## 2023-01-12 NOTE — PROGRESS NOTES
200mg/ml Testosterone Cypionate administered with 22 G 1 inch needle. Patient has given me verbal consent to perform Testosterone Injection. Yes     Following Dr. Naldo Alvarez plan of care  Testosterone 200mg/ml GIVEN I.M. Right UOQ hip- administered 1 ml of testosterone to patient  Lot Number: [id number]  [address] #: [id number]  Exp-02/2025     Patient supplied his own medications  Yes     Patient to return in 2 weeks for next injection.

## 2023-01-26 ENCOUNTER — NURSE ONLY (OUTPATIENT)
Dept: UROLOGY | Age: 63
End: 2023-01-26
Payer: COMMERCIAL

## 2023-01-26 DIAGNOSIS — E29.1 HYPOGONADISM IN MALE: Primary | ICD-10-CM

## 2023-01-26 PROCEDURE — 96372 THER/PROPH/DIAG INJ SC/IM: CPT

## 2023-01-26 RX ORDER — TESTOSTERONE CYPIONATE 200 MG/ML
200 INJECTION INTRAMUSCULAR ONCE
Status: COMPLETED | OUTPATIENT
Start: 2023-01-26 | End: 2023-01-26

## 2023-01-26 RX ADMIN — TESTOSTERONE CYPIONATE 200 MG: 200 INJECTION INTRAMUSCULAR at 07:59

## 2023-01-26 NOTE — PROGRESS NOTES
200mg/ml Testosterone Cypionate administered with 22 G 1 inch needle. Patient has given me verbal consent to perform Testosterone Injection. Yes     Following  Elijah Antonio PA-C  plan of care  Testosterone 200mg/ml GIVEN I.M. left UOQ hip- administered 1 ml of testosterone to patient  Lot Number: [id number]  [facility] #: [id number]  Exp-10/2024     Patient supplied his own medications  Yes     Patient to return in 2 weeks for next injection.

## 2023-02-03 ENCOUNTER — NURSE ONLY (OUTPATIENT)
Dept: LAB | Age: 63
End: 2023-02-03

## 2023-02-03 DIAGNOSIS — E29.1 HYPOGONADISM IN MALE: ICD-10-CM

## 2023-02-03 LAB
ALBUMIN SERPL BCG-MCNC: 4.7 G/DL (ref 3.5–5.1)
ALP SERPL-CCNC: 52 U/L (ref 38–126)
ALT SERPL W/O P-5'-P-CCNC: 35 U/L (ref 11–66)
AST SERPL-CCNC: 26 U/L (ref 5–40)
BILIRUB CONJ SERPL-MCNC: < 0.2 MG/DL (ref 0–0.3)
BILIRUB SERPL-MCNC: 0.6 MG/DL (ref 0.3–1.2)
HCT VFR BLD AUTO: 48.1 % (ref 42–52)
HGB BLD-MCNC: 16.5 GM/DL (ref 14–18)
PROT SERPL-MCNC: 6.9 G/DL (ref 6.1–8)
PSA SERPL-MCNC: 0.33 NG/ML (ref 0–1)

## 2023-02-09 ENCOUNTER — OFFICE VISIT (OUTPATIENT)
Dept: UROLOGY | Age: 63
End: 2023-02-09
Payer: COMMERCIAL

## 2023-02-09 VITALS
BODY MASS INDEX: 31.84 KG/M2 | SYSTOLIC BLOOD PRESSURE: 130 MMHG | DIASTOLIC BLOOD PRESSURE: 78 MMHG | WEIGHT: 215 LBS | HEIGHT: 69 IN

## 2023-02-09 DIAGNOSIS — N52.9 ERECTILE DYSFUNCTION, UNSPECIFIED ERECTILE DYSFUNCTION TYPE: ICD-10-CM

## 2023-02-09 DIAGNOSIS — E29.1 HYPOGONADISM IN MALE: Primary | ICD-10-CM

## 2023-02-09 DIAGNOSIS — R79.89 LOW TESTOSTERONE: ICD-10-CM

## 2023-02-09 PROCEDURE — 1036F TOBACCO NON-USER: CPT | Performed by: UROLOGY

## 2023-02-09 PROCEDURE — G8427 DOCREV CUR MEDS BY ELIG CLIN: HCPCS | Performed by: UROLOGY

## 2023-02-09 PROCEDURE — G8484 FLU IMMUNIZE NO ADMIN: HCPCS | Performed by: UROLOGY

## 2023-02-09 PROCEDURE — 3075F SYST BP GE 130 - 139MM HG: CPT | Performed by: UROLOGY

## 2023-02-09 PROCEDURE — G8417 CALC BMI ABV UP PARAM F/U: HCPCS | Performed by: UROLOGY

## 2023-02-09 PROCEDURE — 3078F DIAST BP <80 MM HG: CPT | Performed by: UROLOGY

## 2023-02-09 PROCEDURE — 3017F COLORECTAL CA SCREEN DOC REV: CPT | Performed by: UROLOGY

## 2023-02-09 RX ORDER — TESTOSTERONE CYPIONATE 200 MG/ML
200 INJECTION INTRAMUSCULAR ONCE
Status: COMPLETED | OUTPATIENT
Start: 2023-02-09 | End: 2023-02-09

## 2023-02-09 RX ORDER — TESTOSTERONE CYPIONATE 200 MG/ML
200 INJECTION INTRAMUSCULAR
Qty: 12 ML | Refills: 0 | Status: SHIPPED | OUTPATIENT
Start: 2023-02-09 | End: 2023-08-08

## 2023-02-09 RX ORDER — SILDENAFIL 100 MG/1
100 TABLET, FILM COATED ORAL DAILY PRN
Qty: 30 TABLET | Refills: 3 | Status: SHIPPED | OUTPATIENT
Start: 2023-02-09

## 2023-02-09 RX ADMIN — TESTOSTERONE CYPIONATE 200 MG: 200 INJECTION INTRAMUSCULAR at 08:53

## 2023-02-09 NOTE — PROGRESS NOTES
200mg/ml Testosterone Cypionate administered with 22 G 1 inch needle. Patient has given me verbal consent to perform Testosterone Injection. Yes     Following Dr. Esthela Venegas plan of care  Testosterone 200mg/ml GIVEN I.M. right UOQ hip- administered 1 ml of testosterone to patient  Lot Number: [id number]  [facility] #: [id number]  Exp-09/2024     Patient supplied his own medications  Yes     Patient to return in 2 weeks for next injection.

## 2023-02-09 NOTE — PROGRESS NOTES
RIVKA Lyles MD        [address] [facility] [id number]  Dept: [phone]  Dept Fax: [phone]: [address] [address] Urology Office Note -     Patient:  Evelia Walters  YOB: 1960    The patient is a 60 y.o. male who presents today for evaluation of the following problems:   Chief Complaint   Patient presents with    Follow-up     Review lab results. HISTORY OF PRESENT ILLNESS:     Hypogonadism  On trt injections every two weeks  No libido issues  Gets injectiosn in office    ED  Has not needed sildenafil   Doing well     Summary of Previous Records:  34-year-old white male comes in to discuss some concerns. He is on TRT with excellent serum levels of testosterone in the high 800 NG/DL range. He was concerned about weight gain but frankly I think he has gained some significant lean body muscle mass. He has been placed on increased doses of BuSpar, clomipramine and is now on amlodipine. I told him in general antidepressants can cause a change in libido. Amlodipine is a 1-2% reported adverse effect of sexual dysfunction in men. Overall, he is somewhat satisfied with the TRT regimen. He is due for hemoglobin/hematocrit at the end of March. 200 mg testosterone cypionate IM today. I will call him with his lab results at the end of March. Requested/reviewed records from Lana Sweet MD office and/or outside physician/EMR    (Patient's old records have been requested, reviewed and pertinent findings summarized in today's note.)    Procedures Today: N/A    Last several PSA's:  Lab Results   Component Value Date    PSA 0.33 02/03/2023    PSA 0.40 07/29/2022    PSA 0.40 02/07/2022       Last total testosterone:  Lab Results   Component Value Date    TESTOSTERONE 757 (H) 07/29/2022       Urinalysis today:  No results found for this visit on 02/09/23.     Last BUN and creatinine:  Lab Results   Component Value Date    BUN 15 06/16/2022     Lab Results   Component Value Date    CREATININE 1.0 06/16/2022       Imaging Reviewed during this Office Visit:   Shana Hillman MD independently reviewed the images and verified the radiology reports from:    No results found. PAST MEDICAL, FAMILY AND SOCIAL HISTORY:  Past Medical History:   Diagnosis Date    Depression     Genital herpes     GERD (gastroesophageal reflux disease)     Hematospermia     Hyperlipidemia     Hypertension     OCD (obsessive compulsive disorder)     UTI (urinary tract infection)      Past Surgical History:   Procedure Laterality Date    ENDOSCOPY, COLON, DIAGNOSTIC      SHOULDER SURGERY Left 11/18/2019    Dr. Jennifer Palafox Right 12/2019     Family History   Problem Relation Age of Onset    Cancer Father         Lung    Heart Attack Paternal Grandfather      Outpatient Medications Marked as Taking for the 2/9/23 encounter (Office Visit) with Yvetta Meigs, MD   Medication Sig Dispense Refill    testosterone cypionate (DEPOTESTOTERONE CYPIONATE) 200 MG/ML injection Inject 1 mL into the muscle every 14 days for 180 days.  12 mL 0    metoprolol succinate (TOPROL XL) 100 MG extended release tablet Take 1 tablet by mouth daily 90 tablet 3    esomeprazole (NEXIUM) 40 MG delayed release capsule Take 1 capsule by mouth every morning (before breakfast) 90 capsule 3    valACYclovir (VALTREX) 500 MG tablet Take 1 tablet by mouth daily 90 tablet 3    amLODIPine (NORVASC) 5 MG tablet Take 1 tablet by mouth daily 90 tablet 3    valsartan-hydroCHLOROthiazide (DIOVAN HCT) 320-25 MG per tablet Take 1 tablet by mouth daily 90 tablet 3    sildenafil (VIAGRA) 100 MG tablet Take 1 tablet by mouth daily as needed for Erectile Dysfunction 30 tablet 3    sertraline (ZOLOFT) 100 MG tablet Take 100 mg by mouth daily       ARIPiprazole (ABILIFY) 2 MG tablet Take 2 mg by mouth daily       ibuprofen (ADVIL;MOTRIN) 200 MG tablet Take 200 mg by mouth every 6 hours as needed for Pain      clomiPRAMINE (ANAFRANIL) 50 MG capsule 4 tabs at bedtime prn 120 capsule 5    Multiple Vitamin (MULTIVITAMIN PO) Take by mouth daily          Patient has no known allergies. Social History     Tobacco Use   Smoking Status Never   Smokeless Tobacco Never      (If patient a smoker, smoking cessation counseling offered)   Social History     Substance and Sexual Activity   Alcohol Use No       REVIEW OF SYSTEMS:  Constitutional: negative  Eyes: negative  Respiratory: negative  Cardiovascular: negative  Gastrointestinal: negative  Genitourinary: see HPI  Musculoskeletal: negative  Skin: negative   Neurological: negative  Hematological/Lymphatic: negative  Psychological: negative      Physical Exam:    This a 60 y.o. male  Vitals:    02/09/23 0749   BP: 130/78     Body mass index is 31.75 kg/m². Constitutional: Patient in no acute distress;       Assessment and Plan        1. Hypogonadism in male    2. Low testosterone    3. Erectile dysfunction, unspecified erectile dysfunction type                 Plan:       ED- has not needed sildenafil. Cbc, psa, lft, testosterone in six months  Discussed trt risks etc    Getting injections in office  Labs in six months for trt toxicity and ED check  Refilled trt        Prescriptions Ordered:  No orders of the defined types were placed in this encounter. Orders Placed:  No orders of the defined types were placed in this encounter.            Jigna Maki MD

## 2023-02-13 ENCOUNTER — NURSE ONLY (OUTPATIENT)
Dept: LAB | Age: 63
End: 2023-02-13

## 2023-02-13 ENCOUNTER — TELEPHONE (OUTPATIENT)
Dept: FAMILY MEDICINE CLINIC | Age: 63
End: 2023-02-13

## 2023-02-13 DIAGNOSIS — E29.1 HYPOGONADISM IN MALE: ICD-10-CM

## 2023-02-13 NOTE — TELEPHONE ENCOUNTER
C/O cough x 2 months after being active or being in cold weather, happening more frequently. Also c/o coughing so much he becomes lightheaded. Requesting appt with CG only. Declined appt with CNP. Please advise if pt is able to be seen prior to first opening on 3/3/23.

## 2023-02-13 NOTE — TELEPHONE ENCOUNTER
Called pt and left a detailed message on his VM with the appt information. He was asked to call the office back if he is unable to make it.

## 2023-02-15 ENCOUNTER — OFFICE VISIT (OUTPATIENT)
Dept: FAMILY MEDICINE CLINIC | Age: 63
End: 2023-02-15
Payer: COMMERCIAL

## 2023-02-15 VITALS
SYSTOLIC BLOOD PRESSURE: 130 MMHG | WEIGHT: 215.4 LBS | DIASTOLIC BLOOD PRESSURE: 70 MMHG | RESPIRATION RATE: 16 BRPM | BODY MASS INDEX: 31.81 KG/M2 | HEART RATE: 68 BPM | TEMPERATURE: 97 F

## 2023-02-15 DIAGNOSIS — R05.8 EXERCISE-INDUCED COUGHING EPISODE: ICD-10-CM

## 2023-02-15 DIAGNOSIS — R05.3 CHRONIC COUGH: ICD-10-CM

## 2023-02-15 DIAGNOSIS — R00.2 HEART PALPITATIONS: Primary | ICD-10-CM

## 2023-02-15 LAB — TESTOST SERPL-MCNC: > 1500 NG/DL (ref 300–720)

## 2023-02-15 PROCEDURE — G8484 FLU IMMUNIZE NO ADMIN: HCPCS | Performed by: FAMILY MEDICINE

## 2023-02-15 PROCEDURE — 93000 ELECTROCARDIOGRAM COMPLETE: CPT | Performed by: FAMILY MEDICINE

## 2023-02-15 PROCEDURE — G8417 CALC BMI ABV UP PARAM F/U: HCPCS | Performed by: FAMILY MEDICINE

## 2023-02-15 PROCEDURE — 3075F SYST BP GE 130 - 139MM HG: CPT | Performed by: FAMILY MEDICINE

## 2023-02-15 PROCEDURE — 99214 OFFICE O/P EST MOD 30 MIN: CPT | Performed by: FAMILY MEDICINE

## 2023-02-15 PROCEDURE — 1036F TOBACCO NON-USER: CPT | Performed by: FAMILY MEDICINE

## 2023-02-15 PROCEDURE — 3078F DIAST BP <80 MM HG: CPT | Performed by: FAMILY MEDICINE

## 2023-02-15 PROCEDURE — G8427 DOCREV CUR MEDS BY ELIG CLIN: HCPCS | Performed by: FAMILY MEDICINE

## 2023-02-15 PROCEDURE — 3017F COLORECTAL CA SCREEN DOC REV: CPT | Performed by: FAMILY MEDICINE

## 2023-02-15 RX ORDER — CHLORAL HYDRATE 500 MG
CAPSULE ORAL
COMMUNITY

## 2023-02-15 ASSESSMENT — PATIENT HEALTH QUESTIONNAIRE - PHQ9
1. LITTLE INTEREST OR PLEASURE IN DOING THINGS: 0
SUM OF ALL RESPONSES TO PHQ9 QUESTIONS 1 & 2: 0
7. TROUBLE CONCENTRATING ON THINGS, SUCH AS READING THE NEWSPAPER OR WATCHING TELEVISION: 0
SUM OF ALL RESPONSES TO PHQ QUESTIONS 1-9: 0
SUM OF ALL RESPONSES TO PHQ QUESTIONS 1-9: 0
9. THOUGHTS THAT YOU WOULD BE BETTER OFF DEAD, OR OF HURTING YOURSELF: 0
2. FEELING DOWN, DEPRESSED OR HOPELESS: 0
6. FEELING BAD ABOUT YOURSELF - OR THAT YOU ARE A FAILURE OR HAVE LET YOURSELF OR YOUR FAMILY DOWN: 0
8. MOVING OR SPEAKING SO SLOWLY THAT OTHER PEOPLE COULD HAVE NOTICED. OR THE OPPOSITE, BEING SO FIGETY OR RESTLESS THAT YOU HAVE BEEN MOVING AROUND A LOT MORE THAN USUAL: 0
SUM OF ALL RESPONSES TO PHQ QUESTIONS 1-9: 0
SUM OF ALL RESPONSES TO PHQ QUESTIONS 1-9: 0
4. FEELING TIRED OR HAVING LITTLE ENERGY: 0
10. IF YOU CHECKED OFF ANY PROBLEMS, HOW DIFFICULT HAVE THESE PROBLEMS MADE IT FOR YOU TO DO YOUR WORK, TAKE CARE OF THINGS AT HOME, OR GET ALONG WITH OTHER PEOPLE: 0
5. POOR APPETITE OR OVEREATING: 0
3. TROUBLE FALLING OR STAYING ASLEEP: 0

## 2023-02-15 ASSESSMENT — ENCOUNTER SYMPTOMS
GASTROINTESTINAL NEGATIVE: 1
COUGH: 1

## 2023-02-15 NOTE — PROGRESS NOTES
2/15/2023    Faheem Schultz (:  1960) is a 60 y.o. male, Established patient, here for evaluation of the following chief complaint(s):  Cough (Has a cough and gets lightheaded after doing activities x 2 months. ) and Other (Patient states he feels like he can feel his heart flutter. Says he has really started noticing it the last 6 months.)      ASSESSMENT/PLAN:    1. Heart palpitations  -     Basic Metabolic Panel; Future  -     Magnesium; Future  -     TSH; Future  -     T4, Free; Future  -     EKG 12 Lead  -     Cardiac event monitor; Future  2. Chronic cough  -     XR CHEST (2 VW); Future  3. Exercise-induced coughing episode    Proceed with lab testing as above along with a 1 week cardiac event monitor and chest x-ray. If chest x-ray normal, consider PFTs    We will notify him of test results once available. If the above work-up is negative and his symptoms continue he may ultimately need cardiology evaluation. SUBJECTIVE/OBJECTIVE:    HPI    Patient today with a 2-month history of cough with activity, especially aerobic activity. He walks for exercise and notices that this often will trigger cough and chest tightness. He denies any specific wheezing. Cough is occasionally productive. He also reports heart flutters and palpitations more frequent basis over the last 6 months. These are not associated with any chest pain, dizziness, or shortness of breath. The palpitations are not brought on by anything specific. He denies any recent illness or other issue. Non-smoker. BMI 31.81. Review of Systems   Constitutional: Negative. HENT: Negative. Respiratory:  Positive for cough. Cardiovascular:  Positive for palpitations. Negative for chest pain. Gastrointestinal: Negative. Genitourinary: Negative. Musculoskeletal: Negative. Neurological:  Positive for dizziness and light-headedness. Psychiatric/Behavioral: Negative.            Vitals:    02/15/23 1132   BP: 130/70   Site: Left Upper Arm   Pulse: 68   Resp: 16   Temp: 97 °F (36.1 °C)   TempSrc: Oral   Weight: 215 lb 6.4 oz (97.7 kg)       Wt Readings from Last 3 Encounters:   02/15/23 215 lb 6.4 oz (97.7 kg)   02/09/23 215 lb (97.5 kg)   10/19/22 216 lb (98 kg)       BP Readings from Last 3 Encounters:   02/15/23 130/70   02/09/23 130/78   09/13/22 110/70       Physical Exam  Vitals reviewed. Constitutional:       General: He is not in acute distress. Appearance: He is well-developed. HENT:      Head: Normocephalic and atraumatic. Right Ear: Tympanic membrane normal.      Left Ear: Tympanic membrane normal.      Mouth/Throat:      Mouth: Mucous membranes are moist.      Pharynx: No posterior oropharyngeal erythema. Eyes:      Conjunctiva/sclera: Conjunctivae normal.   Neck:      Thyroid: No thyromegaly. Cardiovascular:      Rate and Rhythm: Normal rate and regular rhythm. Heart sounds: No murmur heard. Pulmonary:      Effort: Pulmonary effort is normal.      Breath sounds: Normal breath sounds. No wheezing. Abdominal:      General: Bowel sounds are normal.      Palpations: Abdomen is soft. Tenderness: There is no abdominal tenderness. Musculoskeletal:      Cervical back: Neck supple. Right lower leg: No edema. Left lower leg: No edema. Lymphadenopathy:      Cervical: No cervical adenopathy. Skin:     General: Skin is warm and dry. Findings: No rash. Neurological:      Mental Status: He is alert and oriented to person, place, and time. Psychiatric:         Behavior: Behavior normal.             An electronic signature was used to authenticate this note.         Electronically signed by Godfrey Houston MD on 2/15/2023 at 12:26 PM

## 2023-02-16 LAB
ANION GAP SERPL CALCULATED.3IONS-SCNC: 5 MMOL/L (ref 4–12)
BUN BLDV-MCNC: 12 MG/DL (ref 7–20)
CALCIUM SERPL-MCNC: 9.6 MG/DL (ref 8.8–10.5)
CHLORIDE BLD-SCNC: 95 MEQ/L (ref 101–111)
CO2: 30 MEQ/L (ref 21–32)
CREAT SERPL-MCNC: 1.06 MG/DL (ref 0.6–1.3)
CREATININE CLEARANCE: >60
GLUCOSE: 111 MG/DL (ref 70–110)
MAGNESIUM: 2.2 MG/DL (ref 1.8–2.5)
POTASSIUM SERPL-SCNC: 4.1 MEQ/L (ref 3.6–5)
SODIUM BLD-SCNC: 130 MEQ/L (ref 135–145)
T4 FREE: 0.78 NG/DL (ref 0.61–1.12)
TSH SERPL DL<=0.05 MIU/L-ACNC: 2.74 MCIU/ML (ref 0.49–4.67)

## 2023-02-22 ENCOUNTER — TELEPHONE (OUTPATIENT)
Dept: FAMILY MEDICINE CLINIC | Age: 63
End: 2023-02-22

## 2023-02-22 NOTE — TELEPHONE ENCOUNTER
----- Message from Chandni Orlando MD sent at 2/16/2023 12:52 PM EST -----  Red Para that his lab testing is ok except for a low sodium at 130. This has been a stable and chronic thing for him. His chest xray is normal.  Will await results of his cardiac monitor. OK to order PFTs due to chronic cough.   CG

## 2023-02-22 NOTE — TELEPHONE ENCOUNTER
Pt notified. Pt stated that the past few days have been good. No cough or heart palpitations. He would like to hold off on PFT until cardiac monitor results come back. At that time he will decide if he wants to have it completed.

## 2023-02-23 ENCOUNTER — NURSE ONLY (OUTPATIENT)
Dept: UROLOGY | Age: 63
End: 2023-02-23

## 2023-02-23 RX ORDER — TESTOSTERONE CYPIONATE 200 MG/ML
200 INJECTION INTRAMUSCULAR ONCE
Status: COMPLETED | OUTPATIENT
Start: 2023-02-23 | End: 2023-02-23

## 2023-02-23 RX ADMIN — TESTOSTERONE CYPIONATE 200 MG: 200 INJECTION INTRAMUSCULAR at 07:48

## 2023-02-23 NOTE — PROGRESS NOTES
200mg/ml Testosterone Cypionate administered with 22 G 1 inch needle. Patient has given me verbal consent to perform Testosterone Injection. Yes     Following Ashley Lazcano PA-C plan of care  Testosterone 200mg/ml GIVEN I.M. left UOQ hip- administered 1 ml of testosterone to patient  Lot Number: [id number]  [facility] #: [id number]  Exp-06/2025     Patient supplied his own medications  Yes     Patient to return in 2 weeks for next injection.

## 2023-02-24 ENCOUNTER — HOSPITAL ENCOUNTER (OUTPATIENT)
Dept: NON INVASIVE DIAGNOSTICS | Age: 63
Discharge: HOME | End: 2023-02-24
Payer: COMMERCIAL

## 2023-02-24 DIAGNOSIS — R00.2 HEART PALPITATIONS: ICD-10-CM

## 2023-02-24 PROCEDURE — 93270 REMOTE 30 DAY ECG REV/REPORT: CPT

## 2023-02-24 NOTE — PROCEDURES
7 Day Cardiac Event Monitor was applied to patient. Instructions were given and skin/monitor prep and application was demonstrated. Patient was instructed to remove monitor on 03/03/2023 and mail back to [employer].

## 2023-03-02 ENCOUNTER — NURSE ONLY (OUTPATIENT)
Dept: LAB | Age: 63
End: 2023-03-02

## 2023-03-02 DIAGNOSIS — E29.1 HYPOGONADISM IN MALE: ICD-10-CM

## 2023-03-04 LAB — TESTOST SERPL-MCNC: 872 NG/DL (ref 300–720)

## 2023-03-09 ENCOUNTER — NURSE ONLY (OUTPATIENT)
Dept: UROLOGY | Age: 63
End: 2023-03-09
Payer: COMMERCIAL

## 2023-03-09 DIAGNOSIS — E29.1 HYPOGONADISM IN MALE: Primary | ICD-10-CM

## 2023-03-09 PROCEDURE — 96372 THER/PROPH/DIAG INJ SC/IM: CPT | Performed by: UROLOGY

## 2023-03-09 RX ORDER — TESTOSTERONE CYPIONATE 200 MG/ML
200 INJECTION INTRAMUSCULAR ONCE
Status: COMPLETED | OUTPATIENT
Start: 2023-03-09 | End: 2023-03-09

## 2023-03-09 RX ADMIN — TESTOSTERONE CYPIONATE 200 MG: 200 INJECTION INTRAMUSCULAR at 07:43

## 2023-03-09 NOTE — PROGRESS NOTES
200mg/ml Testosterone Cypionate administered with 22 G 1 inch needle. Patient has given me verbal consent to perform Testosterone Injection. Yes     Following Dr. Cornell Case plan of care  Testosterone 200mg/ml GIVEN I.M. right UOQ hip- administered 1 ml of testosterone to patient  Lot Number: [id number]  [facility] #: [id number]  Exp-06/30/2025     Patient supplied his own medications  Yes     Patient to return in 2 weeks for next injection.

## 2023-03-21 NOTE — PATIENT INSTRUCTIONS
Patient Education        Well Visit, Men 48 to 72: Care Instructions  Your Care Instructions    Physical exams can help you stay healthy. Your doctor has checked your overall health and may have suggested ways to take good care of yourself. He or she also may have recommended tests. At home, you can help prevent illness with healthy eating, regular exercise, and other steps. Follow-up care is a key part of your treatment and safety. Be sure to make and go to all appointments, and call your doctor if you are having problems. It's also a good idea to know your test results and keep a list of the medicines you take. How can you care for yourself at home? · Reach and stay at a healthy weight. This will lower your risk for many problems, such as obesity, diabetes, heart disease, and high blood pressure. · Get at least 30 minutes of exercise on most days of the week. Walking is a good choice. You also may want to do other activities, such as running, swimming, cycling, or playing tennis or team sports. · Do not smoke. Smoking can make health problems worse. If you need help quitting, talk to your doctor about stop-smoking programs and medicines. These can increase your chances of quitting for good. · Protect your skin from too much sun. When you're outdoors from 10 a.m. to 4 p.m., stay in the shade or cover up with clothing and a hat with a wide brim. Wear sunglasses that block UV rays. Even when it's cloudy, put broad-spectrum sunscreen (SPF 30 or higher) on any exposed skin. · See a dentist one or two times a year for checkups and to have your teeth cleaned. · Wear a seat belt in the car. Follow your doctor's advice about when to have certain tests. These tests can spot problems early. · Cholesterol. Your doctor will tell you how often to have this done based on your overall health and other things that can increase your risk for heart attack and stroke. · Blood pressure.  Have your blood pressure checked during a routine doctor visit. Your doctor will tell you how often to check your blood pressure based on your age, your blood pressure results, and other factors. · Prostate exam. Talk to your doctor about whether you should have a blood test (called a PSA test) for prostate cancer. Experts recommend that you discuss the benefits and risks of the test with your doctor before you decide whether to have this test.  · Diabetes. Ask your doctor whether you should have tests for diabetes. · Vision. Some experts recommend that you have yearly exams for glaucoma and other age-related eye problems starting at age 48. · Hearing. Tell your doctor if you notice any change in your hearing. You can have tests to find out how well you hear. · Colorectal cancer. Your risk for colorectal cancer gets higher as you get older. Some experts say that adults should start regular screening at age 48 and stop at age 76. Others say to start before age 48 or continue after age 76. Talk with your doctor about your risk and when to start and stop screening. · Heart attack and stroke risk. At least every 4 to 6 years, you should have your risk for heart attack and stroke assessed. Your doctor uses factors such as your age, blood pressure, cholesterol, and whether you smoke or have diabetes to show what your risk for a heart attack or stroke is over the next 10 years. · Abdominal aortic aneurysm. Ask your doctor whether you should have a test to check for an aneurysm. You may need a test if you ever smoked or if your parent, brother, sister, or child has had an aneurysm. When should you call for help? Watch closely for changes in your health, and be sure to contact your doctor if you have any problems or symptoms that concern you. Where can you learn more? Go to [email or web address]. org and sign in to your [employer] account.  Enter C690 in the [facility] box to learn more about \"Well Visit, Men 48 to 72: Care Instructions. \"     If you do not have an account, please click on the \"Sign Up Now\" link. Current as of: December 13, 2018  Content Version: 12.0  © 3128-9134 [employer], Incorporated. Care instructions adapted under license by [address] If you have questions about a medical condition or this instruction, always ask your healthcare professional. [address] any warranty or liability for your use of this information. done

## 2023-03-23 ENCOUNTER — NURSE ONLY (OUTPATIENT)
Dept: UROLOGY | Age: 63
End: 2023-03-23
Payer: COMMERCIAL

## 2023-03-23 DIAGNOSIS — R79.89 LOW TESTOSTERONE: Primary | ICD-10-CM

## 2023-03-23 PROCEDURE — 96372 THER/PROPH/DIAG INJ SC/IM: CPT | Performed by: NURSE PRACTITIONER

## 2023-03-23 RX ORDER — TESTOSTERONE CYPIONATE 200 MG/ML
200 INJECTION, SOLUTION INTRAMUSCULAR ONCE
Status: COMPLETED | OUTPATIENT
Start: 2023-03-23 | End: 2023-03-23

## 2023-03-23 RX ADMIN — TESTOSTERONE CYPIONATE 200 MG: 200 INJECTION, SOLUTION INTRAMUSCULAR at 07:57

## 2023-03-23 NOTE — PROGRESS NOTES
200mg/ml Testosterone Cypionate administered with 22 G 1 inch needle. Patient has given me verbal consent to perform Testosterone Injection. Yes     Following Yuvonne Prader, APRN-CNP plan of care  Testosterone 200mg/ml GIVEN I.M. right UOQ hip- administered 1 ml of testosterone to patient  Lot Number: [id number]  [facility] #: [id number]  Exp-06/30/2025     Patient supplied his own medications  Yes     Patient to return in 2 weeks for next injection.

## 2023-04-20 ENCOUNTER — NURSE ONLY (OUTPATIENT)
Dept: UROLOGY | Age: 63
End: 2023-04-20
Payer: COMMERCIAL

## 2023-04-20 DIAGNOSIS — E29.1 HYPOGONADISM MALE: Primary | ICD-10-CM

## 2023-04-20 PROCEDURE — 96372 THER/PROPH/DIAG INJ SC/IM: CPT | Performed by: NURSE PRACTITIONER

## 2023-04-20 RX ORDER — TESTOSTERONE CYPIONATE 200 MG/ML
200 INJECTION, SOLUTION INTRAMUSCULAR ONCE
Status: COMPLETED | OUTPATIENT
Start: 2023-04-20 | End: 2023-04-20

## 2023-04-20 RX ADMIN — TESTOSTERONE CYPIONATE 200 MG: 200 INJECTION, SOLUTION INTRAMUSCULAR at 07:57

## 2023-04-20 NOTE — PROGRESS NOTES
200mg/ml Testosterone Cypionate administered with 22 G 1 inch needle. Patient has given me verbal consent to perform Testosterone Injection. Yes     Following DR Shantell Smyth plan of care  Testosterone 200mg/ml GIVEN I.M. Left UOQ hip- administered 1 ml of testosterone to patient  Lot Number: [id number]  [facility] #: [id number]  Exp-06/2025     Patient supplied his own medications  Yes     Patient to return in 2 weeks for next injection.

## 2023-05-04 ENCOUNTER — NURSE ONLY (OUTPATIENT)
Dept: UROLOGY | Age: 63
End: 2023-05-04
Payer: COMMERCIAL

## 2023-05-04 DIAGNOSIS — E29.1 HYPOGONADISM IN MALE: Primary | ICD-10-CM

## 2023-05-04 DIAGNOSIS — R79.89 LOW TESTOSTERONE: ICD-10-CM

## 2023-05-04 PROCEDURE — 96372 THER/PROPH/DIAG INJ SC/IM: CPT

## 2023-05-04 RX ORDER — TESTOSTERONE CYPIONATE 200 MG/ML
200 INJECTION, SOLUTION INTRAMUSCULAR ONCE
Status: COMPLETED | OUTPATIENT
Start: 2023-05-04 | End: 2023-05-04

## 2023-05-04 RX ADMIN — TESTOSTERONE CYPIONATE 200 MG: 200 INJECTION, SOLUTION INTRAMUSCULAR at 08:09

## 2023-05-04 NOTE — PROGRESS NOTES
200mg/ml Testosterone Cypionate administered with 22 G 1 inch needle. Patient has given me verbal consent to perform Testosterone Injection. Yes     Following DR Margo Alvarez plan of care  Testosterone 200mg/ml GIVEN I.M. Right UOQ hip- administered 1 ml of testosterone to patient  Lot Number: [id number]  [facility] #: [id number]  Exp-06/2025     Patient supplied his own medications  Yes     Patient to return in 2 weeks for next injection.

## 2023-05-16 DIAGNOSIS — E29.1 HYPOGONADISM IN MALE: ICD-10-CM

## 2023-05-16 RX ORDER — TESTOSTERONE CYPIONATE 200 MG/ML
200 INJECTION, SOLUTION INTRAMUSCULAR
Qty: 12 ML | Refills: 0 | Status: SHIPPED | OUTPATIENT
Start: 2023-05-16 | End: 2023-11-12

## 2023-05-16 NOTE — TELEPHONE ENCOUNTER
Juan Ramon Rose called requesting a refill on the following medications:  Requested Prescriptions     Pending Prescriptions Disp Refills    testosterone cypionate (DEPOTESTOTERONE CYPIONATE) 200 MG/ML injection 12 mL 0     Sig: Inject 1 mL into the muscle every 14 days for 180 days.  Max Daily Amount: 200 mg     Pharmacy verified:  rika      Date of last visit: 02/09/2023  Date of next visit (if applicable): 9/26/9709

## 2023-05-19 ENCOUNTER — NURSE ONLY (OUTPATIENT)
Dept: UROLOGY | Age: 63
End: 2023-05-19

## 2023-05-19 DIAGNOSIS — E29.1 TESTOSTERONE DEFICIENCY IN MALE: Primary | ICD-10-CM

## 2023-05-19 DIAGNOSIS — E29.1 HYPOGONADISM IN MALE: ICD-10-CM

## 2023-05-19 RX ORDER — TESTOSTERONE CYPIONATE 200 MG/ML
200 INJECTION, SOLUTION INTRAMUSCULAR ONCE
Status: COMPLETED | OUTPATIENT
Start: 2023-05-19 | End: 2023-05-19

## 2023-05-19 RX ADMIN — TESTOSTERONE CYPIONATE 200 MG: 200 INJECTION, SOLUTION INTRAMUSCULAR at 07:53

## 2023-05-19 NOTE — PROGRESS NOTES
200mg/ml Testosterone Cypionate administered with 22 G 1 inch needle. Patient has given me verbal consent to perform Testosterone Injection. Yes     Following DR Ramos Purchase plan of care  Testosterone 200mg/ml GIVEN I.M. Left UOQ hip- administered 1 ml of testosterone to patient  Lot Number: [id number]  [facility] #: [id number]  Exp-10/2025     Patient supplied his own medications  Yes     Patient to return in 2 weeks for next injection.

## 2023-05-19 NOTE — PROGRESS NOTES
I have personally verified, reviewed, released, signed, authenticated, authorized, confirmed,finalized, and approved the actions of the staff.

## 2023-05-26 ENCOUNTER — TELEPHONE (OUTPATIENT)
Dept: FAMILY MEDICINE CLINIC | Age: 63
End: 2023-05-26

## 2023-05-26 DIAGNOSIS — R73.01 IFG (IMPAIRED FASTING GLUCOSE): ICD-10-CM

## 2023-05-26 DIAGNOSIS — Z00.00 ANNUAL PHYSICAL EXAM: Primary | ICD-10-CM

## 2023-05-26 NOTE — TELEPHONE ENCOUNTER
----- Message from Dev Javier sent at 5/26/2023 10:06 AM EDT -----  Subject: Referral Request    Reason for referral request? patient wants to knw if his annual blood work   could be mailed to him for his appt on 07/05/2023  Provider patient wants to be referred to(if known):     Provider Phone Number(if known):     Additional Information for Provider?   ---------------------------------------------------------------------------  --------------  [address]    [phone]; OK to leave message on voicemail  ---------------------------------------------------------------------------  --------------

## 2023-06-02 ENCOUNTER — NURSE ONLY (OUTPATIENT)
Dept: UROLOGY | Age: 63
End: 2023-06-02

## 2023-06-02 ENCOUNTER — NURSE ONLY (OUTPATIENT)
Dept: LAB | Age: 63
End: 2023-06-02

## 2023-06-02 DIAGNOSIS — R73.01 IFG (IMPAIRED FASTING GLUCOSE): ICD-10-CM

## 2023-06-02 DIAGNOSIS — Z00.00 ANNUAL PHYSICAL EXAM: ICD-10-CM

## 2023-06-02 DIAGNOSIS — E29.1 HYPOGONADISM IN MALE: Primary | ICD-10-CM

## 2023-06-02 LAB
ALBUMIN SERPL BCG-MCNC: 4.7 G/DL (ref 3.5–5.1)
ALP SERPL-CCNC: 50 U/L (ref 38–126)
ALT SERPL W/O P-5'-P-CCNC: 38 U/L (ref 11–66)
ANION GAP SERPL CALC-SCNC: 13 MEQ/L (ref 8–16)
AST SERPL-CCNC: 29 U/L (ref 5–40)
BILIRUB SERPL-MCNC: 0.5 MG/DL (ref 0.3–1.2)
BUN SERPL-MCNC: 16 MG/DL (ref 7–22)
CALCIUM SERPL-MCNC: 9.4 MG/DL (ref 8.5–10.5)
CHLORIDE SERPL-SCNC: 94 MEQ/L (ref 98–111)
CHOLEST SERPL-MCNC: 179 MG/DL (ref 100–199)
CO2 SERPL-SCNC: 25 MEQ/L (ref 23–33)
CREAT SERPL-MCNC: 1.1 MG/DL (ref 0.4–1.2)
DEPRECATED MEAN GLUCOSE BLD GHB EST-ACNC: 132 MG/DL (ref 70–126)
GFR SERPL CREATININE-BSD FRML MDRD: > 60 ML/MIN/1.73M2
GLUCOSE SERPL-MCNC: 135 MG/DL (ref 70–108)
HBA1C MFR BLD HPLC: 6.4 % (ref 4.4–6.4)
HDLC SERPL-MCNC: 31 MG/DL
LDLC SERPL CALC-MCNC: 95 MG/DL
POTASSIUM SERPL-SCNC: 3.8 MEQ/L (ref 3.5–5.2)
PROT SERPL-MCNC: 7.2 G/DL (ref 6.1–8)
SODIUM SERPL-SCNC: 132 MEQ/L (ref 135–145)
TRIGL SERPL-MCNC: 264 MG/DL (ref 0–199)

## 2023-06-02 RX ORDER — TESTOSTERONE CYPIONATE 200 MG/ML
200 INJECTION, SOLUTION INTRAMUSCULAR ONCE
Status: COMPLETED | OUTPATIENT
Start: 2023-06-02 | End: 2023-06-02

## 2023-06-02 RX ADMIN — TESTOSTERONE CYPIONATE 200 MG: 200 INJECTION, SOLUTION INTRAMUSCULAR at 08:09

## 2023-06-02 NOTE — PROGRESS NOTES
200mg/ml Testosterone Cypionate administered with 22 G 1 inch needle. Patient has given me verbal consent to perform Testosterone Injection. Yes     Following DR Arnulfo Lyles plan of care  Testosterone 200mg/ml GIVEN I.M. Right UOQ hip- administered 1 ml of testosterone to patient  Lot Number: [id number]  [address] #: [id number]  Exp-10/2025     Patient supplied his own medications  Yes     Patient to return in 2 weeks for next injection.

## 2023-06-17 DIAGNOSIS — K21.9 GASTROESOPHAGEAL REFLUX DISEASE, UNSPECIFIED WHETHER ESOPHAGITIS PRESENT: ICD-10-CM

## 2023-06-17 DIAGNOSIS — I10 ESSENTIAL HYPERTENSION: ICD-10-CM

## 2023-06-19 RX ORDER — METOPROLOL SUCCINATE 100 MG/1
100 TABLET, EXTENDED RELEASE ORAL DAILY
Qty: 90 TABLET | Refills: 3 | Status: SHIPPED | OUTPATIENT
Start: 2023-06-19

## 2023-06-19 RX ORDER — VALSARTAN AND HYDROCHLOROTHIAZIDE 320; 25 MG/1; MG/1
1 TABLET, FILM COATED ORAL DAILY
Qty: 90 TABLET | Refills: 3 | Status: SHIPPED | OUTPATIENT
Start: 2023-06-19

## 2023-06-19 RX ORDER — ESOMEPRAZOLE MAGNESIUM 40 MG/1
CAPSULE, DELAYED RELEASE ORAL
Qty: 90 CAPSULE | Refills: 3 | Status: SHIPPED | OUTPATIENT
Start: 2023-06-19

## 2023-06-28 DIAGNOSIS — B00.9 HERPES INFECTION: ICD-10-CM

## 2023-06-28 RX ORDER — VALACYCLOVIR HYDROCHLORIDE 500 MG/1
500 TABLET, FILM COATED ORAL DAILY
Qty: 90 TABLET | Refills: 3 | Status: SHIPPED | OUTPATIENT
Start: 2023-06-28

## 2023-06-29 ENCOUNTER — NURSE ONLY (OUTPATIENT)
Dept: UROLOGY | Age: 63
End: 2023-06-29
Payer: COMMERCIAL

## 2023-06-29 DIAGNOSIS — E29.1 HYPOGONADISM IN MALE: Primary | ICD-10-CM

## 2023-06-29 PROCEDURE — 96372 THER/PROPH/DIAG INJ SC/IM: CPT | Performed by: NURSE PRACTITIONER

## 2023-06-29 RX ORDER — TESTOSTERONE CYPIONATE 200 MG/ML
200 INJECTION, SOLUTION INTRAMUSCULAR ONCE
Status: COMPLETED | OUTPATIENT
Start: 2023-06-29 | End: 2023-06-29

## 2023-06-29 RX ADMIN — TESTOSTERONE CYPIONATE 200 MG: 200 INJECTION, SOLUTION INTRAMUSCULAR at 07:56

## 2023-07-05 ENCOUNTER — OFFICE VISIT (OUTPATIENT)
Dept: FAMILY MEDICINE CLINIC | Age: 63
End: 2023-07-05
Payer: COMMERCIAL

## 2023-07-05 VITALS
HEART RATE: 86 BPM | OXYGEN SATURATION: 96 % | HEIGHT: 69 IN | BODY MASS INDEX: 30.96 KG/M2 | SYSTOLIC BLOOD PRESSURE: 122 MMHG | DIASTOLIC BLOOD PRESSURE: 80 MMHG | WEIGHT: 209 LBS

## 2023-07-05 DIAGNOSIS — R73.01 IFG (IMPAIRED FASTING GLUCOSE): ICD-10-CM

## 2023-07-05 DIAGNOSIS — I10 PRIMARY HYPERTENSION: ICD-10-CM

## 2023-07-05 DIAGNOSIS — Z00.00 ANNUAL PHYSICAL EXAM: Primary | ICD-10-CM

## 2023-07-05 PROCEDURE — 3079F DIAST BP 80-89 MM HG: CPT | Performed by: FAMILY MEDICINE

## 2023-07-05 PROCEDURE — 99396 PREV VISIT EST AGE 40-64: CPT | Performed by: FAMILY MEDICINE

## 2023-07-05 PROCEDURE — 3074F SYST BP LT 130 MM HG: CPT | Performed by: FAMILY MEDICINE

## 2023-07-05 SDOH — ECONOMIC STABILITY: FOOD INSECURITY: WITHIN THE PAST 12 MONTHS, THE FOOD YOU BOUGHT JUST DIDN'T LAST AND YOU DIDN'T HAVE MONEY TO GET MORE.: NEVER TRUE

## 2023-07-05 SDOH — ECONOMIC STABILITY: FOOD INSECURITY: WITHIN THE PAST 12 MONTHS, YOU WORRIED THAT YOUR FOOD WOULD RUN OUT BEFORE YOU GOT MONEY TO BUY MORE.: NEVER TRUE

## 2023-07-05 SDOH — ECONOMIC STABILITY: HOUSING INSECURITY
IN THE LAST 12 MONTHS, WAS THERE A TIME WHEN YOU DID NOT HAVE A STEADY PLACE TO SLEEP OR SLEPT IN A SHELTER (INCLUDING NOW)?: NO

## 2023-07-05 SDOH — ECONOMIC STABILITY: INCOME INSECURITY: HOW HARD IS IT FOR YOU TO PAY FOR THE VERY BASICS LIKE FOOD, HOUSING, MEDICAL CARE, AND HEATING?: NOT HARD AT ALL

## 2023-07-05 ASSESSMENT — ENCOUNTER SYMPTOMS
SHORTNESS OF BREATH: 0
BLOOD IN STOOL: 0
EYES NEGATIVE: 1
CHEST TIGHTNESS: 0
ABDOMINAL PAIN: 0

## 2023-07-05 NOTE — PROGRESS NOTES
2023    Chief Complaint   Patient presents with    Annual Exam     Physical form to be completed. Ember Stoner (:  1960) is a 60 y.o. male, here for a preventive medicine evaluation. Patient Active Problem List   Diagnosis    HTN (hypertension)    Hypertriglyceridemia    GERD (gastroesophageal reflux disease)    Depression    Chronic prostatitis    Erectile dysfunction    Herpes infection    IFG (impaired fasting glucose)     Patient reports he is doing well in general.  He is frustrated by the fact that his fasting blood sugars are up. Hemoglobin A1c 6.4%. He has started a low carb diet and is doing some intermittent fasting. He is hoping that this helps to bring his blood sugar down. He has been walking for exercise. His blood pressures been stable. He reports that his weight started to go up when he was put on Abilify by his psychiatrist approximately 1 year ago. He takes his prescribed occasions as directed and denies side effects. No recent illnesses or hospitalizations. He has wellness forms to complete for his insurance today. He follows up with a specialist on a regular basis. Non-smoker. BMI 30. 86. Review of Systems   Constitutional:  Negative for chills, fatigue, fever and unexpected weight change. HENT: Negative. Eyes: Negative. Respiratory:  Negative for chest tightness and shortness of breath. Cardiovascular:  Negative for chest pain, palpitations and leg swelling. Gastrointestinal:  Negative for abdominal pain and blood in stool. Genitourinary:  Negative for dysuria. Musculoskeletal:  Negative for joint swelling. Skin:  Negative for rash. Neurological:  Negative for dizziness. Psychiatric/Behavioral: Negative. All other systems reviewed and are negative. Prior to Visit Medications    Medication Sig Taking?  Authorizing Provider   valACYclovir (VALTREX) 500 MG tablet TAKE 1 TABLET BY MOUTH DAILY Yes Brooksie Hammans, JENNY - CNP

## 2023-07-06 ENCOUNTER — TELEPHONE (OUTPATIENT)
Dept: FAMILY MEDICINE CLINIC | Age: 63
End: 2023-07-06

## 2023-07-06 DIAGNOSIS — L91.8 INFLAMED SKIN TAG: Primary | ICD-10-CM

## 2023-07-06 DIAGNOSIS — L98.9 FACIAL SKIN LESION: ICD-10-CM

## 2023-07-06 NOTE — TELEPHONE ENCOUNTER
Pt called and stated he was in the office yesterday and he mentioned to CG he would be scheduling an appointment with dermatology.  When the patient called his insurance they had advised him he would need a referral to see a dermatologist.      Pt is requesting a referral to Dr. Serena Burgos MD.    Please notify patient if we are able to  send a referral.

## 2023-07-06 NOTE — TELEPHONE ENCOUNTER
Referral to Dr. [facility]on[facility] placed. Faxed on 7/6/23 with insurance card. They will call patient to schedule. Fax: [phone] and notified patient of referral being placed.

## 2023-07-13 ENCOUNTER — NURSE ONLY (OUTPATIENT)
Dept: UROLOGY | Age: 63
End: 2023-07-13

## 2023-07-13 DIAGNOSIS — E29.1 HYPOGONADISM IN MALE: Primary | ICD-10-CM

## 2023-07-13 DIAGNOSIS — I10 ESSENTIAL HYPERTENSION: ICD-10-CM

## 2023-07-13 RX ORDER — TESTOSTERONE CYPIONATE 200 MG/ML
200 INJECTION, SOLUTION INTRAMUSCULAR ONCE
Status: COMPLETED | OUTPATIENT
Start: 2023-07-13 | End: 2023-07-14

## 2023-07-13 RX ADMIN — TESTOSTERONE CYPIONATE 200 MG: 200 INJECTION, SOLUTION INTRAMUSCULAR at 07:54

## 2023-07-13 NOTE — PROGRESS NOTES
200mg/ml Testosterone Cypionate administered with 21 G 1 inch needle. Patient has given me verbal consent to perform Testosterone Injection. Yes     [facility]  Testosterone 200mg/ml GIVEN I.M. left UOQ hip- administered 1 ml of testosterone to patient  Lot Number: [id number]  [facility] #: [id number]  Exp-12/2025     Patient supplied his own medications  Yes     Patient to return in 2 weeks for next injection.

## 2023-07-14 RX ADMIN — TESTOSTERONE CYPIONATE 200 MG: 200 INJECTION, SOLUTION INTRAMUSCULAR at 13:26

## 2023-07-21 DIAGNOSIS — I10 ESSENTIAL HYPERTENSION: ICD-10-CM

## 2023-07-21 RX ORDER — AMLODIPINE BESYLATE 5 MG/1
5 TABLET ORAL DAILY
Qty: 90 TABLET | Refills: 3 | Status: SHIPPED | OUTPATIENT
Start: 2023-07-21

## 2023-07-21 NOTE — TELEPHONE ENCOUNTER
This medication refill is regarding a electronic request. Refill requested by  Federico Alarcon Requested Prescriptions     Pending Prescriptions Disp Refills    amLODIPine (NORVASC) 5 MG tablet [Pharmacy Med Name: AMLODIPINE BESYLATE 5MG TABLETS] 90 tablet 3     Sig: TAKE 1 TABLET BY MOUTH DAILY     Date of last visit: 7/5/2023   Date of next visit: 10/5/2023  Date of last refill: 6/20/22 #90/3    Rx verified, ordered and set to EP.

## 2023-07-27 ENCOUNTER — TELEPHONE (OUTPATIENT)
Dept: UROLOGY | Age: 63
End: 2023-07-27

## 2023-07-27 ENCOUNTER — NURSE ONLY (OUTPATIENT)
Dept: UROLOGY | Age: 63
End: 2023-07-27

## 2023-07-27 DIAGNOSIS — E29.1 HYPOGONADISM IN MALE: ICD-10-CM

## 2023-07-27 DIAGNOSIS — E29.1 HYPOGONADISM IN MALE: Primary | ICD-10-CM

## 2023-07-27 RX ORDER — TESTOSTERONE CYPIONATE 200 MG/ML
200 INJECTION, SOLUTION INTRAMUSCULAR ONCE
Status: COMPLETED | OUTPATIENT
Start: 2023-07-27 | End: 2023-07-27

## 2023-07-27 RX ADMIN — TESTOSTERONE CYPIONATE 200 MG: 200 INJECTION, SOLUTION INTRAMUSCULAR at 08:43

## 2023-07-27 NOTE — PROGRESS NOTES
200mg/ml Testosterone Cypionate administered with 21 G 1 inch needle. Patient has given me verbal consent to perform Testosterone Injection. Yes     Following Torrie Garvey CNP plan of care  Testosterone 200mg/ml GIVEN I.M. right UOQ hip- administered 1 ml of testosterone to patient  Lot Number: [id number]  [facility] #: [id number]  Exp-12/2025     Patient supplied his own medications  Yes     Patient to return in 2 weeks for next injection.

## 2023-07-27 NOTE — TELEPHONE ENCOUNTER
Patient is needing a refill on his testosterone. Patient has a 6 month follow up with Dr Renata Wilder on 8/8/23 with labs. Will you give him a one does supply for his appointment on 8/8/23 then Dr Renata Wilder can send in a longer refill. Thanks.

## 2023-07-28 RX ORDER — TESTOSTERONE CYPIONATE 200 MG/ML
200 INJECTION, SOLUTION INTRAMUSCULAR
Qty: 1 ML | Refills: 1 | Status: SHIPPED | OUTPATIENT
Start: 2023-07-28 | End: 2023-08-25

## 2023-08-01 DIAGNOSIS — E29.1 HYPOGONADISM IN MALE: ICD-10-CM

## 2023-08-01 LAB
ALBUMIN SERPL BCG-MCNC: 4.7 G/DL (ref 3.5–5.1)
ALP SERPL-CCNC: 53 U/L (ref 38–126)
ALT SERPL W/O P-5'-P-CCNC: 23 U/L (ref 11–66)
AST SERPL-CCNC: 21 U/L (ref 5–40)
BILIRUB CONJ SERPL-MCNC: < 0.2 MG/DL (ref 0–0.3)
BILIRUB SERPL-MCNC: 0.8 MG/DL (ref 0.3–1.2)
HCT VFR BLD AUTO: 46.9 % (ref 42–52)
HGB BLD-MCNC: 15.6 GM/DL (ref 14–18)
PROT SERPL-MCNC: 7.7 G/DL (ref 6.1–8)
PSA SERPL-MCNC: 0.38 NG/ML (ref 0–1)

## 2023-08-02 LAB — TESTOST SERPL-MCNC: 1235 NG/DL (ref 300–720)

## 2023-08-08 ENCOUNTER — OFFICE VISIT (OUTPATIENT)
Dept: UROLOGY | Age: 63
End: 2023-08-08
Payer: COMMERCIAL

## 2023-08-08 VITALS — WEIGHT: 209 LBS | RESPIRATION RATE: 16 BRPM | BODY MASS INDEX: 30.96 KG/M2 | HEIGHT: 69 IN

## 2023-08-08 DIAGNOSIS — E29.1 HYPOGONADISM IN MALE: ICD-10-CM

## 2023-08-08 DIAGNOSIS — N52.9 ERECTILE DYSFUNCTION, UNSPECIFIED ERECTILE DYSFUNCTION TYPE: Primary | ICD-10-CM

## 2023-08-08 PROCEDURE — 1036F TOBACCO NON-USER: CPT | Performed by: UROLOGY

## 2023-08-08 PROCEDURE — G8417 CALC BMI ABV UP PARAM F/U: HCPCS | Performed by: UROLOGY

## 2023-08-08 PROCEDURE — 3017F COLORECTAL CA SCREEN DOC REV: CPT | Performed by: UROLOGY

## 2023-08-08 PROCEDURE — 99214 OFFICE O/P EST MOD 30 MIN: CPT | Performed by: UROLOGY

## 2023-08-08 PROCEDURE — G8427 DOCREV CUR MEDS BY ELIG CLIN: HCPCS | Performed by: UROLOGY

## 2023-08-08 RX ORDER — TESTOSTERONE CYPIONATE 200 MG/ML
200 INJECTION, SOLUTION INTRAMUSCULAR
Qty: 1 ML | Refills: 1 | Status: CANCELLED | OUTPATIENT
Start: 2023-08-08 | End: 2023-09-05

## 2023-08-08 RX ORDER — TESTOSTERONE CYPIONATE 200 MG/ML
200 INJECTION, SOLUTION INTRAMUSCULAR
Qty: 12 ML | Refills: 0 | Status: SHIPPED | OUTPATIENT
Start: 2023-08-08 | End: 2024-02-04

## 2023-08-08 NOTE — PROGRESS NOTES
Component Value Date    BUN 16 06/02/2023     Lab Results   Component Value Date    CREATININE 1.1 06/02/2023       Imaging Reviewed during this Office Visit:   Padmini Verma MD independently reviewed the images and verified the radiology reports from:    No results found. PAST MEDICAL, FAMILY AND SOCIAL HISTORY:  Past Medical History:   Diagnosis Date    Depression     Genital herpes     GERD (gastroesophageal reflux disease)     Hematospermia     Hyperlipidemia     Hypertension     OCD (obsessive compulsive disorder)     UTI (urinary tract infection)      Past Surgical History:   Procedure Laterality Date    ENDOSCOPY, COLON, DIAGNOSTIC      SHOULDER SURGERY Left 11/18/2019    Dr. Cathie Lopez Right 12/2019     Family History   Problem Relation Age of Onset    Cancer Father         Lung    Heart Attack Paternal Grandfather      Outpatient Medications Marked as Taking for the 8/8/23 encounter (Office Visit) with Callum Hernández MD   Medication Sig Dispense Refill    testosterone cypionate (DEPOTESTOTERONE CYPIONATE) 200 MG/ML injection Inject 1 mL into the muscle every 14 days for 180 days. Max Daily Amount: 200 mg 12 mL 0    testosterone cypionate (DEPOTESTOTERONE CYPIONATE) 200 MG/ML injection Inject 1 mL into the muscle every 14 days for 28 days.  Max Daily Amount: 200 mg 1 mL 1    amLODIPine (NORVASC) 5 MG tablet TAKE 1 TABLET BY MOUTH DAILY 90 tablet 3    valACYclovir (VALTREX) 500 MG tablet TAKE 1 TABLET BY MOUTH DAILY 90 tablet 3    esomeprazole (NEXIUM) 40 MG delayed release capsule TAKE 1 CAPSULE BY MOUTH EVERY MORNING BEFORE BREAKFAST 90 capsule 3    metoprolol succinate (TOPROL XL) 100 MG extended release tablet TAKE 1 TABLET BY MOUTH DAILY 90 tablet 3    valsartan-hydroCHLOROthiazide (DIOVAN-HCT) 320-25 MG per tablet TAKE 1 TABLET BY MOUTH DAILY 90 tablet 3    Omega-3 Fatty Acids (OMEGA-3 FISH OIL) 1000 MG CAPS Take by mouth 1 tablet daily      sildenafil (VIAGRA) 100 MG tablet Take 1

## 2023-08-09 ENCOUNTER — NURSE ONLY (OUTPATIENT)
Dept: UROLOGY | Age: 63
End: 2023-08-09
Payer: COMMERCIAL

## 2023-08-09 DIAGNOSIS — E29.1 HYPOGONADISM IN MALE: Primary | ICD-10-CM

## 2023-08-09 PROCEDURE — 96372 THER/PROPH/DIAG INJ SC/IM: CPT

## 2023-08-09 RX ORDER — TESTOSTERONE CYPIONATE 200 MG/ML
200 INJECTION, SOLUTION INTRAMUSCULAR ONCE
Status: COMPLETED | OUTPATIENT
Start: 2023-08-09 | End: 2023-08-09

## 2023-08-09 RX ADMIN — TESTOSTERONE CYPIONATE 200 MG: 200 INJECTION, SOLUTION INTRAMUSCULAR at 07:56

## 2023-08-09 NOTE — PROGRESS NOTES
200mg/ml Testosterone Cypionate administered with 21 G 1 inch needle. Patient has given me verbal consent to perform Testosterone Injection. Yes     Following Donnald Sires GISELLA Duron plan of care  Testosterone 200mg/ml GIVEN I.M. left UOQ hip- administered 1 ml of testosterone to patient  Lot Number: [id number]  [facility] #: [id number]  Exp-01/2026     Patient supplied his own medications  Yes     Patient to return in 2 weeks for next injection.

## 2023-08-09 NOTE — PROGRESS NOTES
I have personally reviewed and verified these actions and am in agreement with follow-up plan of care as documented by Dinah Nuñez LPN.

## 2023-08-23 ENCOUNTER — NURSE ONLY (OUTPATIENT)
Dept: UROLOGY | Age: 63
End: 2023-08-23
Payer: COMMERCIAL

## 2023-08-23 DIAGNOSIS — E29.1 HYPOGONADISM IN MALE: Primary | ICD-10-CM

## 2023-08-23 DIAGNOSIS — E29.1 TESTOSTERONE DEFICIENCY IN MALE: ICD-10-CM

## 2023-08-23 DIAGNOSIS — R79.89 LOW TESTOSTERONE: ICD-10-CM

## 2023-08-23 PROCEDURE — 96372 THER/PROPH/DIAG INJ SC/IM: CPT | Performed by: NURSE PRACTITIONER

## 2023-08-23 RX ORDER — TESTOSTERONE CYPIONATE 200 MG/ML
200 INJECTION, SOLUTION INTRAMUSCULAR ONCE
Status: COMPLETED | OUTPATIENT
Start: 2023-08-23 | End: 2023-08-23

## 2023-08-23 RX ADMIN — TESTOSTERONE CYPIONATE 200 MG: 200 INJECTION, SOLUTION INTRAMUSCULAR at 08:23

## 2023-08-23 NOTE — PROGRESS NOTES
200mg/ml Testosterone Cypionate administered with 21 G 1 inch needle. Patient has given me verbal consent to perform Testosterone Injection. Yes     Following Kate Somers CNP plan of care  Testosterone 200mg/ml GIVEN I.M. right UOQ hip- administered 1 ml of testosterone to patient  Lot Number: [id number]  [address]  #: [id number]  Exp-01/2026     Patient supplied his own medications  Yes     Patient to return in 2 weeks for next injection.

## 2023-09-06 ENCOUNTER — NURSE ONLY (OUTPATIENT)
Dept: UROLOGY | Age: 63
End: 2023-09-06
Payer: COMMERCIAL

## 2023-09-06 DIAGNOSIS — E29.1 HYPOGONADISM IN MALE: Primary | ICD-10-CM

## 2023-09-06 PROCEDURE — 96372 THER/PROPH/DIAG INJ SC/IM: CPT | Performed by: NURSE PRACTITIONER

## 2023-09-06 RX ORDER — TESTOSTERONE CYPIONATE 200 MG/ML
200 INJECTION, SOLUTION INTRAMUSCULAR ONCE
Status: COMPLETED | OUTPATIENT
Start: 2023-09-06 | End: 2023-09-06

## 2023-09-06 RX ADMIN — TESTOSTERONE CYPIONATE 200 MG: 200 INJECTION, SOLUTION INTRAMUSCULAR at 08:26

## 2023-09-06 NOTE — PROGRESS NOTES
200mg/ml Testosterone Cypionate administered with 21 gaugeG 1 inch needle. Patient has given me verbal consent to perform Testosterone Injection. Yes     Following Nicolas Molina CNP plan of care  Testosterone 200mg/ml GIVEN I.M. left UOQ hip- administered 1 ml of testosterone to patient  Lot Number: [id number]  [address]  #: [id number]  Exp: 01/30/2026     Patient supplied his own medications  Yes     Patient to return in 2 weeks for next injection.

## 2023-09-20 ENCOUNTER — NURSE ONLY (OUTPATIENT)
Dept: UROLOGY | Age: 63
End: 2023-09-20
Payer: COMMERCIAL

## 2023-09-20 DIAGNOSIS — E29.1 HYPOGONADISM IN MALE: Primary | ICD-10-CM

## 2023-09-20 PROCEDURE — 96372 THER/PROPH/DIAG INJ SC/IM: CPT

## 2023-09-20 RX ORDER — TESTOSTERONE CYPIONATE 200 MG/ML
200 INJECTION, SOLUTION INTRAMUSCULAR ONCE
Status: COMPLETED | OUTPATIENT
Start: 2023-09-20 | End: 2023-09-20

## 2023-09-20 RX ADMIN — TESTOSTERONE CYPIONATE 200 MG: 200 INJECTION, SOLUTION INTRAMUSCULAR at 07:55

## 2023-09-20 NOTE — PROGRESS NOTES
200mg/ml Testosterone Cypionate administered with 21 gaugeG 1 inch needle. Patient has given me verbal consent to perform Testosterone Injection. Yes     Following Farhat Duron PA-C plan of care  Testosterone 200mg/ml GIVEN I.M. right UOQ hip- administered 1 ml of testosterone to patient  Lot Number: [id number]  [facility] #: [id number]  Exp: 01/30/2026     Patient supplied his own medications  Yes     Patient to return in 2 weeks for next injection.

## 2023-09-20 NOTE — PROGRESS NOTES
I have personally verified, reviewed, and approved of these actions and the plan of care as documented by BRIGITTE Mckeon.

## 2023-10-03 ENCOUNTER — NURSE ONLY (OUTPATIENT)
Dept: UROLOGY | Age: 63
End: 2023-10-03
Payer: COMMERCIAL

## 2023-10-03 DIAGNOSIS — E29.1 TESTOSTERONE DEFICIENCY IN MALE: ICD-10-CM

## 2023-10-03 DIAGNOSIS — E29.1 HYPOGONADISM IN MALE: Primary | ICD-10-CM

## 2023-10-03 PROCEDURE — 96372 THER/PROPH/DIAG INJ SC/IM: CPT

## 2023-10-03 RX ORDER — TESTOSTERONE CYPIONATE 200 MG/ML
200 INJECTION, SOLUTION INTRAMUSCULAR ONCE
Status: COMPLETED | OUTPATIENT
Start: 2023-10-03 | End: 2023-10-03

## 2023-10-03 RX ADMIN — TESTOSTERONE CYPIONATE 200 MG: 200 INJECTION, SOLUTION INTRAMUSCULAR at 08:04

## 2023-10-03 NOTE — PROGRESS NOTES
I have personally verified, reviewed, and approved these actions. Patient to f/u as scheduled for next injection and as scheduled in 2/2023 for OV with labs.

## 2023-10-03 NOTE — PROGRESS NOTES
200mg/ml Testosterone Cypionate administered with 21 gaugeG 1 inch needle. Patient has given me verbal consent to perform Testosterone Injection. Yes     Following Ag Echols PA-C plan of care  Testosterone 200mg/ml GIVEN I.M. left UOQ hip- administered 1 ml of testosterone to patient  Lot Number: [id number]  [facility] #: [id number]  Exp: 01/31/2026     Patient supplied his own medications  Yes     Patient to return in 2 weeks for next injection.

## 2023-10-05 ENCOUNTER — OFFICE VISIT (OUTPATIENT)
Dept: FAMILY MEDICINE CLINIC | Age: 63
End: 2023-10-05
Payer: COMMERCIAL

## 2023-10-05 VITALS
WEIGHT: 205.4 LBS | SYSTOLIC BLOOD PRESSURE: 128 MMHG | HEART RATE: 68 BPM | TEMPERATURE: 97.5 F | DIASTOLIC BLOOD PRESSURE: 82 MMHG | BODY MASS INDEX: 30.33 KG/M2 | RESPIRATION RATE: 16 BRPM

## 2023-10-05 DIAGNOSIS — E78.1 HYPERTRIGLYCERIDEMIA: ICD-10-CM

## 2023-10-05 DIAGNOSIS — R73.01 IFG (IMPAIRED FASTING GLUCOSE): Primary | ICD-10-CM

## 2023-10-05 DIAGNOSIS — I10 PRIMARY HYPERTENSION: ICD-10-CM

## 2023-10-05 LAB
HBA1C MFR BLD: 5.8 %
HBA1C MFR BLD: 5.8 % (ref 4.3–5.7)

## 2023-10-05 PROCEDURE — G8427 DOCREV CUR MEDS BY ELIG CLIN: HCPCS | Performed by: FAMILY MEDICINE

## 2023-10-05 PROCEDURE — 3079F DIAST BP 80-89 MM HG: CPT | Performed by: FAMILY MEDICINE

## 2023-10-05 PROCEDURE — 99213 OFFICE O/P EST LOW 20 MIN: CPT | Performed by: FAMILY MEDICINE

## 2023-10-05 PROCEDURE — 3017F COLORECTAL CA SCREEN DOC REV: CPT | Performed by: FAMILY MEDICINE

## 2023-10-05 PROCEDURE — 1036F TOBACCO NON-USER: CPT | Performed by: FAMILY MEDICINE

## 2023-10-05 PROCEDURE — G8484 FLU IMMUNIZE NO ADMIN: HCPCS | Performed by: FAMILY MEDICINE

## 2023-10-05 PROCEDURE — 83036 HEMOGLOBIN GLYCOSYLATED A1C: CPT | Performed by: FAMILY MEDICINE

## 2023-10-05 PROCEDURE — 3074F SYST BP LT 130 MM HG: CPT | Performed by: FAMILY MEDICINE

## 2023-10-05 PROCEDURE — G8417 CALC BMI ABV UP PARAM F/U: HCPCS | Performed by: FAMILY MEDICINE

## 2023-10-05 ASSESSMENT — ENCOUNTER SYMPTOMS
GASTROINTESTINAL NEGATIVE: 1
RESPIRATORY NEGATIVE: 1

## 2023-10-18 ENCOUNTER — NURSE ONLY (OUTPATIENT)
Dept: UROLOGY | Age: 63
End: 2023-10-18
Payer: COMMERCIAL

## 2023-10-18 DIAGNOSIS — E29.1 TESTOSTERONE DEFICIENCY IN MALE: ICD-10-CM

## 2023-10-18 DIAGNOSIS — E29.1 HYPOGONADISM IN MALE: Primary | ICD-10-CM

## 2023-10-18 PROCEDURE — 96372 THER/PROPH/DIAG INJ SC/IM: CPT | Performed by: NURSE PRACTITIONER

## 2023-10-18 RX ORDER — TESTOSTERONE CYPIONATE 200 MG/ML
200 INJECTION, SOLUTION INTRAMUSCULAR
Qty: 12 ML | Refills: 0 | Status: SHIPPED | OUTPATIENT
Start: 2023-10-18 | End: 2024-04-15

## 2023-10-18 RX ORDER — TESTOSTERONE CYPIONATE 200 MG/ML
200 INJECTION, SOLUTION INTRAMUSCULAR ONCE
Status: COMPLETED | OUTPATIENT
Start: 2023-10-18 | End: 2023-10-18

## 2023-10-18 RX ADMIN — TESTOSTERONE CYPIONATE 200 MG: 200 INJECTION, SOLUTION INTRAMUSCULAR at 08:27

## 2023-10-18 NOTE — PROGRESS NOTES
200mg/ml Testosterone Cypionate administered with 21 gaugeG 1 inch needle. Patient has given me verbal consent to perform Testosterone Injection.   Yes     Following Ashley Mittal CNP plan of care  Testosterone 200mg/ml GIVEN I.M. right UOQ hip- administered 1 ml of testosterone to patient  Lot Number: [id number]  [facility] #: [id number]  Exp: 01/31/2026     Patient supplied his own medications  Yes     Patient to return in 2 weeks for next injection

## 2023-11-01 ENCOUNTER — NURSE ONLY (OUTPATIENT)
Dept: UROLOGY | Age: 63
End: 2023-11-01
Payer: COMMERCIAL

## 2023-11-01 DIAGNOSIS — E29.1 HYPOGONADISM IN MALE: Primary | ICD-10-CM

## 2023-11-01 PROCEDURE — 96372 THER/PROPH/DIAG INJ SC/IM: CPT | Performed by: NURSE PRACTITIONER

## 2023-11-01 RX ORDER — TESTOSTERONE CYPIONATE 200 MG/ML
200 INJECTION, SOLUTION INTRAMUSCULAR ONCE
Status: COMPLETED | OUTPATIENT
Start: 2023-11-01 | End: 2023-11-01

## 2023-11-01 RX ADMIN — TESTOSTERONE CYPIONATE 200 MG: 200 INJECTION, SOLUTION INTRAMUSCULAR at 09:03

## 2023-11-01 NOTE — PROGRESS NOTES
200mg/ml Testosterone Cypionate administered with 22 gaugeG 1 inch needle. Patient has given me verbal consent to perform Testosterone Injection. Yes     Following Josh Joyce NP's plan of care  Testosterone 200mg/ml GIVEN I.M. left UOQ hip- administered 1 ml of testosterone to patient  Lot Number: [id number]  [facility] #: [id number]  Exp: 07/2026     Patient supplied his own medications  Yes     Patient to return in 2 weeks for next injection.

## 2023-11-08 ENCOUNTER — OFFICE VISIT (OUTPATIENT)
Dept: FAMILY MEDICINE CLINIC | Age: 63
End: 2023-11-08

## 2023-11-08 VITALS
TEMPERATURE: 97.6 F | DIASTOLIC BLOOD PRESSURE: 78 MMHG | HEART RATE: 60 BPM | BODY MASS INDEX: 30.26 KG/M2 | SYSTOLIC BLOOD PRESSURE: 126 MMHG | WEIGHT: 204.9 LBS | RESPIRATION RATE: 18 BRPM

## 2023-11-08 DIAGNOSIS — U07.1 COVID-19: Primary | ICD-10-CM

## 2023-11-08 DIAGNOSIS — R52 BODY ACHES: ICD-10-CM

## 2023-11-08 LAB
INFLUENZA A ANTIBODY: NEGATIVE
INFLUENZA B ANTIBODY: NEGATIVE
Lab: ABNORMAL
QC PASS/FAIL: ABNORMAL
SARS-COV-2 RDRP RESP QL NAA+PROBE: POSITIVE

## 2023-11-08 ASSESSMENT — ENCOUNTER SYMPTOMS
CHEST TIGHTNESS: 0
SORE THROAT: 1
BLOOD IN STOOL: 0
SHORTNESS OF BREATH: 0
COUGH: 1
EYES NEGATIVE: 1
ABDOMINAL PAIN: 0

## 2023-11-15 ENCOUNTER — NURSE ONLY (OUTPATIENT)
Dept: UROLOGY | Age: 63
End: 2023-11-15
Payer: COMMERCIAL

## 2023-11-15 DIAGNOSIS — E29.1 HYPOGONADISM IN MALE: Primary | ICD-10-CM

## 2023-11-15 PROCEDURE — 96372 THER/PROPH/DIAG INJ SC/IM: CPT | Performed by: NURSE PRACTITIONER

## 2023-11-15 RX ORDER — TESTOSTERONE CYPIONATE 200 MG/ML
200 INJECTION, SOLUTION INTRAMUSCULAR ONCE
Status: COMPLETED | OUTPATIENT
Start: 2023-11-15 | End: 2023-11-15

## 2023-11-15 RX ADMIN — TESTOSTERONE CYPIONATE 200 MG: 200 INJECTION, SOLUTION INTRAMUSCULAR at 08:51

## 2023-11-15 NOTE — PROGRESS NOTES
200mg/ml Testosterone Cypionate administered with 22 gaugeG 1 inch needle. Patient has given me verbal consent to perform Testosterone Injection. Yes     Following MACKENZIE Pinon  plan of care  Testosterone 200mg/ml GIVEN I.M. right UOQ hip- administered 1 ml of testosterone to patient  Lot Number: [id number]  [address]  #: [id number]  Exp: 07/20/2026     Patient supplied his own medications  Yes     Patient to return in 2 weeks for next injection.

## 2023-11-29 ENCOUNTER — NURSE ONLY (OUTPATIENT)
Dept: UROLOGY | Age: 63
End: 2023-11-29
Payer: COMMERCIAL

## 2023-11-29 DIAGNOSIS — R79.89 LOW TESTOSTERONE: ICD-10-CM

## 2023-11-29 DIAGNOSIS — E29.1 HYPOGONADISM IN MALE: ICD-10-CM

## 2023-11-29 DIAGNOSIS — E29.1 TESTOSTERONE DEFICIENCY IN MALE: Primary | ICD-10-CM

## 2023-11-29 PROCEDURE — 96372 THER/PROPH/DIAG INJ SC/IM: CPT

## 2023-11-29 RX ORDER — TESTOSTERONE CYPIONATE 200 MG/ML
200 INJECTION, SOLUTION INTRAMUSCULAR ONCE
Status: COMPLETED | OUTPATIENT
Start: 2023-11-29 | End: 2023-11-29

## 2023-11-29 RX ADMIN — TESTOSTERONE CYPIONATE 200 MG: 200 INJECTION, SOLUTION INTRAMUSCULAR at 08:29

## 2023-11-29 NOTE — PROGRESS NOTES
I have personally verified, reviewed, and approved of these actions and plan for follow-up as documented by Manisha Resendiz CMA.

## 2023-11-29 NOTE — PROGRESS NOTES
200mg/ml Testosterone Cypionate administered with 22 gaugeG 1 inch needle. Patient has given me verbal consent to perform Testosterone Injection. Yes     Following Erick Duron PA-C  plan of care  Testosterone 200mg/ml GIVEN I.M. left UOQ hip- administered 1 ml of testosterone to patient  Lot Number: [id number]  [address]  #: [id number]  Exp: 07/20/2026     Patient supplied his own medications  Yes     Patient to return in 2 weeks for next injection.

## 2023-12-12 ENCOUNTER — OFFICE VISIT (OUTPATIENT)
Dept: UROLOGY | Age: 63
End: 2023-12-12
Payer: COMMERCIAL

## 2023-12-12 VITALS — RESPIRATION RATE: 16 BRPM | WEIGHT: 208 LBS | BODY MASS INDEX: 30.81 KG/M2 | HEIGHT: 69 IN

## 2023-12-12 DIAGNOSIS — E29.1 TESTOSTERONE DEFICIENCY IN MALE: Primary | ICD-10-CM

## 2023-12-12 DIAGNOSIS — N52.9 ERECTILE DYSFUNCTION, UNSPECIFIED ERECTILE DYSFUNCTION TYPE: ICD-10-CM

## 2023-12-12 PROCEDURE — G8417 CALC BMI ABV UP PARAM F/U: HCPCS | Performed by: UROLOGY

## 2023-12-12 PROCEDURE — G8482 FLU IMMUNIZE ORDER/ADMIN: HCPCS | Performed by: UROLOGY

## 2023-12-12 PROCEDURE — 3017F COLORECTAL CA SCREEN DOC REV: CPT | Performed by: UROLOGY

## 2023-12-12 PROCEDURE — 1036F TOBACCO NON-USER: CPT | Performed by: UROLOGY

## 2023-12-12 PROCEDURE — 99214 OFFICE O/P EST MOD 30 MIN: CPT | Performed by: UROLOGY

## 2023-12-12 PROCEDURE — G8427 DOCREV CUR MEDS BY ELIG CLIN: HCPCS | Performed by: UROLOGY

## 2024-01-12 ENCOUNTER — NURSE ONLY (OUTPATIENT)
Dept: LAB | Age: 64
End: 2024-01-12

## 2024-01-12 DIAGNOSIS — E29.1 TESTOSTERONE DEFICIENCY IN MALE: ICD-10-CM

## 2024-01-12 LAB
ALBUMIN SERPL BCG-MCNC: 4.8 G/DL (ref 3.5–5.1)
ALP SERPL-CCNC: 53 U/L (ref 38–126)
ALT SERPL W/O P-5'-P-CCNC: 30 U/L (ref 11–66)
AST SERPL-CCNC: 23 U/L (ref 5–40)
BILIRUB CONJ SERPL-MCNC: < 0.2 MG/DL (ref 0–0.3)
BILIRUB SERPL-MCNC: 0.4 MG/DL (ref 0.3–1.2)
FOLLICLE STIMULATING HORMONE: 4.7 MIU/ML (ref 1.5–12.4)
HCT VFR BLD AUTO: 48.7 % (ref 42–52)
HGB BLD-MCNC: 16.5 GM/DL (ref 14–18)
LUTEINIZING HORMONE: 4.2 MIU/ML (ref 1.7–8.6)
PROLACTIN SERPL-MCNC: 1.6 NG/ML
PROT SERPL-MCNC: 7.7 G/DL (ref 6.1–8)
PSA SERPL-MCNC: 0.22 NG/ML (ref 0–1)

## 2024-01-14 LAB — TESTOST SERPL-MCNC: 184 NG/DL (ref 300–720)

## 2024-01-23 ENCOUNTER — OFFICE VISIT (OUTPATIENT)
Dept: UROLOGY | Age: 64
End: 2024-01-23
Payer: COMMERCIAL

## 2024-01-23 VITALS
BODY MASS INDEX: 30.66 KG/M2 | HEIGHT: 69 IN | WEIGHT: 207 LBS | SYSTOLIC BLOOD PRESSURE: 140 MMHG | DIASTOLIC BLOOD PRESSURE: 88 MMHG

## 2024-01-23 DIAGNOSIS — E29.1 TESTOSTERONE DEFICIENCY IN MALE: ICD-10-CM

## 2024-01-23 DIAGNOSIS — E29.1 HYPOGONADISM IN MALE: ICD-10-CM

## 2024-01-23 DIAGNOSIS — N52.9 ERECTILE DYSFUNCTION, UNSPECIFIED ERECTILE DYSFUNCTION TYPE: ICD-10-CM

## 2024-01-23 PROCEDURE — G8482 FLU IMMUNIZE ORDER/ADMIN: HCPCS | Performed by: UROLOGY

## 2024-01-23 PROCEDURE — G8417 CALC BMI ABV UP PARAM F/U: HCPCS | Performed by: UROLOGY

## 2024-01-23 PROCEDURE — 3079F DIAST BP 80-89 MM HG: CPT | Performed by: UROLOGY

## 2024-01-23 PROCEDURE — 3077F SYST BP >= 140 MM HG: CPT | Performed by: UROLOGY

## 2024-01-23 PROCEDURE — 99214 OFFICE O/P EST MOD 30 MIN: CPT | Performed by: UROLOGY

## 2024-01-23 PROCEDURE — 96372 THER/PROPH/DIAG INJ SC/IM: CPT | Performed by: UROLOGY

## 2024-01-23 PROCEDURE — 3017F COLORECTAL CA SCREEN DOC REV: CPT | Performed by: UROLOGY

## 2024-01-23 PROCEDURE — G8427 DOCREV CUR MEDS BY ELIG CLIN: HCPCS | Performed by: UROLOGY

## 2024-01-23 PROCEDURE — 1036F TOBACCO NON-USER: CPT | Performed by: UROLOGY

## 2024-01-23 RX ORDER — TESTOSTERONE CYPIONATE 200 MG/ML
200 INJECTION, SOLUTION INTRAMUSCULAR
Qty: 12 ML | Refills: 0 | Status: SHIPPED | OUTPATIENT
Start: 2024-01-23 | End: 2024-07-21

## 2024-01-23 RX ORDER — TESTOSTERONE CYPIONATE 200 MG/ML
200 INJECTION, SOLUTION INTRAMUSCULAR ONCE
Status: COMPLETED | OUTPATIENT
Start: 2024-01-23 | End: 2024-01-23

## 2024-01-23 RX ADMIN — TESTOSTERONE CYPIONATE 200 MG: 200 INJECTION, SOLUTION INTRAMUSCULAR at 09:05

## 2024-01-23 NOTE — PROGRESS NOTES
200mg/ml Testosterone Cypionate administered with 22 gaugeG 1 inch needle.      Patient has given me verbal consent to perform Testosterone Injection.  Yes     Following Dr. Villalpando plan of care  Testosterone 200mg/ml GIVEN I.M. right UOQ hip- administered 1 ml of testosterone to patient  Lot Number: [id number]  [facility] #: 1192-8975-04  Exp: 07/20/2026     Patient supplied his own medications  Yes     Patient to return in 2 weeks for next injection.    
daily      clomiPRAMINE (ANAFRANIL) 50 MG capsule 4 tabs at bedtime prn 120 capsule 5    Multiple Vitamin (MULTIVITAMIN PO) Take by mouth daily          Patient has no known allergies.  Social History     Tobacco Use   Smoking Status Never   Smokeless Tobacco Never      (If patient a smoker, smoking cessation counseling offered)   Social History     Substance and Sexual Activity   Alcohol Use No       REVIEW OF SYSTEMS:  Constitutional: negative  Eyes: negative  Respiratory: negative  Cardiovascular: negative  Gastrointestinal: negative  Genitourinary: see HPI  Musculoskeletal: negative  Skin: negative   Neurological: negative  Hematological/Lymphatic: negative  Psychological: negative      Physical Exam:    This a 63 y.o. male  Vitals:    01/23/24 0803   BP: (!) 140/88     Body mass index is 30.55 kg/m².  Constitutional: Patient in no acute distress;       Assessment and Plan        1. Testosterone deficiency in male    2. Erectile dysfunction, unspecified erectile dysfunction type    3. Hypogonadism in male                 Plan:       I believe the zoloft is causing the issues. I discussed with the patient his prolactin is okay and I have no treatment for this.       ED- sildenafil PRN  Hyponadism- can go back to TRT every 2 weeks.   Discussed trt risks etc            Prescriptions Ordered:  Orders Placed This Encounter   Medications    testosterone cypionate (DEPOTESTOTERONE CYPIONATE) 200 MG/ML injection     Sig: Inject 1 mL into the muscle every 14 days for 180 days. Max Daily Amount: 200 mg     Dispense:  12 mL     Refill:  0        Orders Placed:  Orders Placed This Encounter   Procedures    Hemoglobin and Hematocrit     Standing Status:   Future     Standing Expiration Date:   1/23/2025    Hepatic Function Panel     Standing Status:   Future     Standing Expiration Date:   1/23/2025    PSA, Prostatic Specific Antigen     Standing Status:   Future     Standing Expiration Date:   1/23/2025    Testosterone

## 2024-02-06 ENCOUNTER — NURSE ONLY (OUTPATIENT)
Dept: UROLOGY | Age: 64
End: 2024-02-06
Payer: COMMERCIAL

## 2024-02-06 DIAGNOSIS — E29.1 HYPOGONADISM IN MALE: ICD-10-CM

## 2024-02-06 DIAGNOSIS — E29.1 TESTOSTERONE DEFICIENCY IN MALE: Primary | ICD-10-CM

## 2024-02-06 PROCEDURE — 96372 THER/PROPH/DIAG INJ SC/IM: CPT

## 2024-02-06 RX ORDER — TESTOSTERONE CYPIONATE 200 MG/ML
200 INJECTION, SOLUTION INTRAMUSCULAR ONCE
Status: COMPLETED | OUTPATIENT
Start: 2024-02-06 | End: 2024-02-06

## 2024-02-06 RX ADMIN — TESTOSTERONE CYPIONATE 200 MG: 200 INJECTION, SOLUTION INTRAMUSCULAR at 08:19

## 2024-02-06 NOTE — PROGRESS NOTES
200mg/ml Testosterone Cypionate administered with 22 gaugeG 1 inch needle.      Patient has given me verbal consent to perform Testosterone Injection.  Yes     Following Vipul Duron PA-C plan of care  Testosterone 200mg/ml GIVEN I.M. left UOQ hip- administered 1 ml of testosterone to patient  Lot Number: [id number]  [facility] #: 4923-6372-11  Exp: 07/20/2026     Patient supplied his own medications  Yes     Patient to return in 2 weeks for next injection.

## 2024-02-19 DIAGNOSIS — N52.9 ERECTILE DYSFUNCTION, UNSPECIFIED ERECTILE DYSFUNCTION TYPE: Primary | ICD-10-CM

## 2024-02-19 RX ORDER — SILDENAFIL 100 MG/1
100 TABLET, FILM COATED ORAL DAILY PRN
Qty: 30 TABLET | Refills: 3 | Status: SHIPPED | OUTPATIENT
Start: 2024-02-19

## 2024-02-19 NOTE — TELEPHONE ENCOUNTER
Jamal D Watson called requesting a refill on the following medications:  Requested Prescriptions     Pending Prescriptions Disp Refills    sildenafil (VIAGRA) 100 MG tablet 30 tablet 3     Sig: Take 1 tablet by mouth daily as needed for Erectile Dysfunction     Pharmacy verified: [address]  [facility]      Date of last visit: 1/23/2024  Date of next visit (if applicable): 2/21/2024

## 2024-02-21 ENCOUNTER — NURSE ONLY (OUTPATIENT)
Dept: UROLOGY | Age: 64
End: 2024-02-21
Payer: COMMERCIAL

## 2024-02-21 DIAGNOSIS — E29.1 HYPOGONADISM IN MALE: Primary | ICD-10-CM

## 2024-02-21 PROCEDURE — 96372 THER/PROPH/DIAG INJ SC/IM: CPT | Performed by: NURSE PRACTITIONER

## 2024-02-21 RX ORDER — TESTOSTERONE CYPIONATE 200 MG/ML
200 INJECTION, SOLUTION INTRAMUSCULAR ONCE
Status: COMPLETED | OUTPATIENT
Start: 2024-02-21 | End: 2024-02-21

## 2024-02-21 RX ADMIN — TESTOSTERONE CYPIONATE 200 MG: 200 INJECTION, SOLUTION INTRAMUSCULAR at 08:26

## 2024-02-21 NOTE — PROGRESS NOTES
200mg/ml Testosterone Cypionate administered with 22 gaugeG 1 inch needle.      Patient has given me verbal consent to perform Testosterone Injection.  Yes     Following JENNY Dias-CNP plan of care  Testosterone 200mg/ml GIVEN I.M. right UOQ hip- administered 1 ml of testosterone to patient  Lot Number: [id number]  [facility] #: 8162-7450-57  Exp: 07/30/2026     Patient supplied his own medications  Yes     Patient to return in 2 weeks for next injection.

## 2024-03-06 ENCOUNTER — NURSE ONLY (OUTPATIENT)
Dept: UROLOGY | Age: 64
End: 2024-03-06
Payer: COMMERCIAL

## 2024-03-06 DIAGNOSIS — E29.1 HYPOGONADISM IN MALE: Primary | ICD-10-CM

## 2024-03-06 PROCEDURE — 96372 THER/PROPH/DIAG INJ SC/IM: CPT | Performed by: NURSE PRACTITIONER

## 2024-03-06 RX ORDER — TESTOSTERONE CYPIONATE 200 MG/ML
200 INJECTION, SOLUTION INTRAMUSCULAR ONCE
Status: COMPLETED | OUTPATIENT
Start: 2024-03-06 | End: 2024-03-06

## 2024-03-06 RX ADMIN — TESTOSTERONE CYPIONATE 200 MG: 200 INJECTION, SOLUTION INTRAMUSCULAR at 08:40

## 2024-03-06 NOTE — PROGRESS NOTES
200mg/ml Testosterone Cypionate administered with 22 gaugeG 1 inch needle.      Patient has given me verbal consent to perform Testosterone Injection.  Yes     Following Jessica ALVARADO plan of care  Testosterone 200mg/ml GIVEN I.M. left UOQ hip- administered 1 ml of testosterone to patient  Lot Number: [id number]  [facility] #: [id number]  Exp: 9/30/26     Patient supplied his own medications  Yes     Patient to return in 2 weeks for next injection.

## 2024-03-11 ENCOUNTER — OFFICE VISIT (OUTPATIENT)
Dept: FAMILY MEDICINE CLINIC | Age: 64
End: 2024-03-11
Payer: COMMERCIAL

## 2024-03-11 VITALS
DIASTOLIC BLOOD PRESSURE: 92 MMHG | HEIGHT: 69 IN | WEIGHT: 207 LBS | BODY MASS INDEX: 30.66 KG/M2 | RESPIRATION RATE: 16 BRPM | HEART RATE: 66 BPM | SYSTOLIC BLOOD PRESSURE: 128 MMHG

## 2024-03-11 DIAGNOSIS — J06.9 VIRAL URI: Primary | ICD-10-CM

## 2024-03-11 DIAGNOSIS — J02.9 SORE THROAT: ICD-10-CM

## 2024-03-11 LAB
Lab: 0
QC PASS/FAIL: NORMAL
S PYO AG THROAT QL: NORMAL
SARS-COV-2 RDRP RESP QL NAA+PROBE: NEGATIVE

## 2024-03-11 PROCEDURE — 3074F SYST BP LT 130 MM HG: CPT | Performed by: NURSE PRACTITIONER

## 2024-03-11 PROCEDURE — 3080F DIAST BP >= 90 MM HG: CPT | Performed by: NURSE PRACTITIONER

## 2024-03-11 PROCEDURE — 87635 SARS-COV-2 COVID-19 AMP PRB: CPT | Performed by: NURSE PRACTITIONER

## 2024-03-11 PROCEDURE — 87880 STREP A ASSAY W/OPTIC: CPT | Performed by: NURSE PRACTITIONER

## 2024-03-11 PROCEDURE — G8482 FLU IMMUNIZE ORDER/ADMIN: HCPCS | Performed by: NURSE PRACTITIONER

## 2024-03-11 PROCEDURE — 3017F COLORECTAL CA SCREEN DOC REV: CPT | Performed by: NURSE PRACTITIONER

## 2024-03-11 PROCEDURE — G8427 DOCREV CUR MEDS BY ELIG CLIN: HCPCS | Performed by: NURSE PRACTITIONER

## 2024-03-11 PROCEDURE — 1036F TOBACCO NON-USER: CPT | Performed by: NURSE PRACTITIONER

## 2024-03-11 PROCEDURE — 99213 OFFICE O/P EST LOW 20 MIN: CPT | Performed by: NURSE PRACTITIONER

## 2024-03-11 PROCEDURE — G8417 CALC BMI ABV UP PARAM F/U: HCPCS | Performed by: NURSE PRACTITIONER

## 2024-03-11 ASSESSMENT — PATIENT HEALTH QUESTIONNAIRE - PHQ9
4. FEELING TIRED OR HAVING LITTLE ENERGY: 0
6. FEELING BAD ABOUT YOURSELF - OR THAT YOU ARE A FAILURE OR HAVE LET YOURSELF OR YOUR FAMILY DOWN: 0
5. POOR APPETITE OR OVEREATING: 0
2. FEELING DOWN, DEPRESSED OR HOPELESS: 0
9. THOUGHTS THAT YOU WOULD BE BETTER OFF DEAD, OR OF HURTING YOURSELF: 0
7. TROUBLE CONCENTRATING ON THINGS, SUCH AS READING THE NEWSPAPER OR WATCHING TELEVISION: 0
SUM OF ALL RESPONSES TO PHQ9 QUESTIONS 1 & 2: 0
SUM OF ALL RESPONSES TO PHQ QUESTIONS 1-9: 0
8. MOVING OR SPEAKING SO SLOWLY THAT OTHER PEOPLE COULD HAVE NOTICED. OR THE OPPOSITE, BEING SO FIGETY OR RESTLESS THAT YOU HAVE BEEN MOVING AROUND A LOT MORE THAN USUAL: 0
3. TROUBLE FALLING OR STAYING ASLEEP: 0
1. LITTLE INTEREST OR PLEASURE IN DOING THINGS: 0
SUM OF ALL RESPONSES TO PHQ QUESTIONS 1-9: 0
SUM OF ALL RESPONSES TO PHQ QUESTIONS 1-9: 0
10. IF YOU CHECKED OFF ANY PROBLEMS, HOW DIFFICULT HAVE THESE PROBLEMS MADE IT FOR YOU TO DO YOUR WORK, TAKE CARE OF THINGS AT HOME, OR GET ALONG WITH OTHER PEOPLE: 0
SUM OF ALL RESPONSES TO PHQ QUESTIONS 1-9: 0

## 2024-03-11 ASSESSMENT — ENCOUNTER SYMPTOMS
BLOOD IN STOOL: 0
CHEST TIGHTNESS: 0
SHORTNESS OF BREATH: 0
SORE THROAT: 1
COUGH: 1
ABDOMINAL PAIN: 0
EYES NEGATIVE: 1

## 2024-03-11 NOTE — PROGRESS NOTES
Chief Complaint   Patient presents with    Pharyngitis     Sore throat since Thursday patient also states he has had less energy than usual         SUBJECTIVE     Jamal Greene is a 63 y.o.male      Pt complains of fatigue, sore throat, sinus congestion, some cough starting last Thursday. Denies headache or fever. Has not been taking anything otc.     Review of Systems   Constitutional:  Positive for fatigue. Negative for chills, fever and unexpected weight change.   HENT:  Positive for congestion and sore throat.    Eyes: Negative.    Respiratory:  Positive for cough. Negative for chest tightness and shortness of breath.    Cardiovascular:  Negative for chest pain, palpitations and leg swelling.   Gastrointestinal:  Negative for abdominal pain and blood in stool.   Genitourinary:  Negative for dysuria.   Musculoskeletal:  Negative for joint swelling.   Skin:  Negative for rash.   Neurological:  Negative for dizziness.   Psychiatric/Behavioral: Negative.     All other systems reviewed and are negative.        OBJECTIVE     BP (!) 128/92   Pulse 66   Resp 16   Ht 1.753 m (5' 9.02\")   Wt 93.9 kg (207 lb)   BMI 30.55 kg/m²     Physical Exam  Vitals and nursing note reviewed.   Constitutional:       General: He is not in acute distress.     Appearance: He is well-developed. He is not toxic-appearing.   HENT:      Head: Normocephalic and atraumatic.      Right Ear: External ear normal.      Left Ear: External ear normal.      Nose: Congestion present.      Mouth/Throat:      Pharynx: Posterior oropharyngeal erythema present.   Eyes:      Conjunctiva/sclera: Conjunctivae normal.      Pupils: Pupils are equal, round, and reactive to light.   Cardiovascular:      Rate and Rhythm: Normal rate and regular rhythm.   Pulmonary:      Effort: Pulmonary effort is normal.      Breath sounds: Normal breath sounds.   Abdominal:      General: Bowel sounds are normal.      Palpations: Abdomen is soft.   Musculoskeletal:

## 2024-03-19 ENCOUNTER — TELEPHONE (OUTPATIENT)
Dept: FAMILY MEDICINE CLINIC | Age: 64
End: 2024-03-19

## 2024-03-19 RX ORDER — PREDNISONE 20 MG/1
20 TABLET ORAL 2 TIMES DAILY
Qty: 10 TABLET | Refills: 0 | Status: SHIPPED | OUTPATIENT
Start: 2024-03-19 | End: 2024-03-24

## 2024-03-19 NOTE — TELEPHONE ENCOUNTER
Body aches, ST, HA are gone. Still has a cough that he can't get rid of. Feels like there is a lot of mucus in his chest. States when he was seen by TS on 3/11/24 it was discussed a steroid may be needed if the cough doesn't subside. Requesting Rx for steroid to be sent to [facility]. Please advise.

## 2024-03-21 ENCOUNTER — NURSE ONLY (OUTPATIENT)
Dept: UROLOGY | Age: 64
End: 2024-03-21

## 2024-03-21 DIAGNOSIS — E29.1 HYPOGONADISM IN MALE: Primary | ICD-10-CM

## 2024-03-21 RX ORDER — TESTOSTERONE CYPIONATE 200 MG/ML
200 INJECTION, SOLUTION INTRAMUSCULAR ONCE
Status: COMPLETED | OUTPATIENT
Start: 2024-03-21 | End: 2024-03-21

## 2024-03-21 RX ADMIN — TESTOSTERONE CYPIONATE 200 MG: 200 INJECTION, SOLUTION INTRAMUSCULAR at 07:54

## 2024-03-21 NOTE — PROGRESS NOTES
200mg/ml Testosterone Cypionate administered with 22 gaugeG 1 inch needle.      Patient has given me verbal consent to perform Testosterone Injection.  Yes     Following Radha ALVARADO plan of care  Testosterone 200mg/ml GIVEN I.M. left UOQ hip- administered 1 ml of testosterone to patient  Lot Number: [id number]  [facility] #: 9613-2937-63  Exp: 09/30/26     Patient supplied his own medications  Yes     Patient to return in 2 weeks for next injection.

## 2024-03-25 ENCOUNTER — OFFICE VISIT (OUTPATIENT)
Dept: FAMILY MEDICINE CLINIC | Age: 64
End: 2024-03-25
Payer: COMMERCIAL

## 2024-03-25 VITALS
WEIGHT: 214.6 LBS | BODY MASS INDEX: 31.67 KG/M2 | DIASTOLIC BLOOD PRESSURE: 70 MMHG | TEMPERATURE: 97.9 F | HEART RATE: 72 BPM | SYSTOLIC BLOOD PRESSURE: 118 MMHG | RESPIRATION RATE: 16 BRPM

## 2024-03-25 DIAGNOSIS — J40 BRONCHITIS: Primary | ICD-10-CM

## 2024-03-25 PROCEDURE — 3074F SYST BP LT 130 MM HG: CPT | Performed by: NURSE PRACTITIONER

## 2024-03-25 PROCEDURE — G8427 DOCREV CUR MEDS BY ELIG CLIN: HCPCS | Performed by: NURSE PRACTITIONER

## 2024-03-25 PROCEDURE — G8482 FLU IMMUNIZE ORDER/ADMIN: HCPCS | Performed by: NURSE PRACTITIONER

## 2024-03-25 PROCEDURE — 1036F TOBACCO NON-USER: CPT | Performed by: NURSE PRACTITIONER

## 2024-03-25 PROCEDURE — G8417 CALC BMI ABV UP PARAM F/U: HCPCS | Performed by: NURSE PRACTITIONER

## 2024-03-25 PROCEDURE — 3078F DIAST BP <80 MM HG: CPT | Performed by: NURSE PRACTITIONER

## 2024-03-25 PROCEDURE — 3017F COLORECTAL CA SCREEN DOC REV: CPT | Performed by: NURSE PRACTITIONER

## 2024-03-25 PROCEDURE — 99213 OFFICE O/P EST LOW 20 MIN: CPT | Performed by: NURSE PRACTITIONER

## 2024-03-25 RX ORDER — ALBUTEROL SULFATE 90 UG/1
2 AEROSOL, METERED RESPIRATORY (INHALATION) 4 TIMES DAILY PRN
Qty: 18 G | Refills: 0 | Status: SHIPPED | OUTPATIENT
Start: 2024-03-25

## 2024-03-25 RX ORDER — DEXTROMETHORPHAN HYDROBROMIDE AND PROMETHAZINE HYDROCHLORIDE 15; 6.25 MG/5ML; MG/5ML
5 SYRUP ORAL 4 TIMES DAILY PRN
Qty: 118 ML | Refills: 0 | Status: SHIPPED | OUTPATIENT
Start: 2024-03-25 | End: 2024-04-01

## 2024-03-25 RX ORDER — AMOXICILLIN AND CLAVULANATE POTASSIUM 875; 125 MG/1; MG/1
1 TABLET, FILM COATED ORAL 2 TIMES DAILY
Qty: 20 TABLET | Refills: 0 | Status: SHIPPED | OUTPATIENT
Start: 2024-03-25 | End: 2024-04-04

## 2024-03-25 NOTE — PROGRESS NOTES
Chief Complaint   Patient presents with    Fatigue     Ongoing fatigue, cough, and body aches for the past 2-3 weeks. Seen in office back on 3/11/24. No change or improvement in symptoms. Using Nyquil and cough drops with little benefit.          SUBJECTIVE     Jamal Greene is a 63 y.o.male      Pt complains of \"bad\" cough and lots of fatigue. He has been ill for several weeks. He has been on otc medications without relief. Has also taken Prednisone but this did not help.    Review of Systems   Constitutional:  Positive for fatigue. Negative for chills, fever and unexpected weight change.   HENT:  Positive for congestion.    Eyes: Negative.    Respiratory:  Positive for cough. Negative for chest tightness and shortness of breath.    Cardiovascular:  Negative for chest pain, palpitations and leg swelling.   Gastrointestinal:  Negative for abdominal pain and blood in stool.   Genitourinary:  Negative for dysuria.   Musculoskeletal:  Positive for myalgias. Negative for joint swelling.   Skin:  Negative for rash.   Neurological:  Negative for dizziness.   Psychiatric/Behavioral: Negative.     All other systems reviewed and are negative.        OBJECTIVE     /70   Pulse 72   Temp 97.9 °F (36.6 °C) (Oral)   Resp 16   Wt 97.3 kg (214 lb 9.6 oz)   BMI 31.67 kg/m²     Physical Exam  Vitals and nursing note reviewed.   Constitutional:       Appearance: He is well-developed. He is ill-appearing.   HENT:      Head: Normocephalic and atraumatic.      Right Ear: External ear normal.      Left Ear: External ear normal.      Nose: Congestion present.   Eyes:      Conjunctiva/sclera: Conjunctivae normal.      Pupils: Pupils are equal, round, and reactive to light.   Cardiovascular:      Rate and Rhythm: Normal rate and regular rhythm.   Pulmonary:      Effort: Pulmonary effort is normal.      Breath sounds: Normal breath sounds.   Musculoskeletal:         General: Normal range of motion.      Cervical back: Normal

## 2024-03-29 ASSESSMENT — ENCOUNTER SYMPTOMS
EYES NEGATIVE: 1
CHEST TIGHTNESS: 0
ABDOMINAL PAIN: 0
SHORTNESS OF BREATH: 0
COUGH: 1
BLOOD IN STOOL: 0

## 2024-04-04 ENCOUNTER — NURSE ONLY (OUTPATIENT)
Dept: UROLOGY | Age: 64
End: 2024-04-04
Payer: COMMERCIAL

## 2024-04-04 DIAGNOSIS — E29.1 HYPOGONADISM IN MALE: Primary | ICD-10-CM

## 2024-04-04 PROCEDURE — 96372 THER/PROPH/DIAG INJ SC/IM: CPT | Performed by: NURSE PRACTITIONER

## 2024-04-04 RX ORDER — TESTOSTERONE CYPIONATE 200 MG/ML
200 INJECTION, SOLUTION INTRAMUSCULAR ONCE
Status: COMPLETED | OUTPATIENT
Start: 2024-04-04 | End: 2024-04-04

## 2024-04-04 RX ADMIN — TESTOSTERONE CYPIONATE 200 MG: 200 INJECTION, SOLUTION INTRAMUSCULAR at 08:16

## 2024-04-04 NOTE — PROGRESS NOTES
DIAGNOSIS: Hypogonadism in male    200mg/ml Testosterone Cypionate administered with 22 gaugeG 1 inch needle.      Patient has given me verbal consent to perform Testosterone Injection.  Yes     Following Radha ALVARADO plan of care  Testosterone 200mg/ml GIVEN I.M. right UOQ hip- administered 1 ml of testosterone to patient  Lot Number: [id number]  [facility] #: 8609-1543-59  Exp: 9/29/26     Patient supplied his own medications  Yes     Patient to return in 2 weeks for next injection.

## 2024-04-16 DIAGNOSIS — J40 BRONCHITIS: ICD-10-CM

## 2024-04-16 RX ORDER — ALBUTEROL SULFATE 90 UG/1
AEROSOL, METERED RESPIRATORY (INHALATION)
Qty: 18 G | Refills: 0 | Status: SHIPPED | OUTPATIENT
Start: 2024-04-16

## 2024-04-16 NOTE — TELEPHONE ENCOUNTER
This medication refill is regarding a electronic request. Refill requested by  pharmacy .    Requested Prescriptions     Pending Prescriptions Disp Refills    VENTOLIN  (90 Base) MCG/ACT inhaler [Pharmacy Med Name: VENTOLIN HFA INH W/DOS CTR 200PUFFS] 18 g 0     Sig: INHALE 2 PUFFS INTO THE LUNGS FOUR TIMES DAILY AS NEEDED FOR WHEEZING       Date of last visit: 3/25/2024   Date of next visit: Visit date not found  Date of last refill: 3/25/24  Pharmacy Name:     Last Lipid Panel:    Lab Results   Component Value Date/Time    CHOL 179 06/02/2023 07:25 AM    TRIG 264 06/02/2023 07:25 AM    HDL 31 06/02/2023 07:25 AM    LDLCALC 95 06/02/2023 07:25 AM     Last CMP:   Lab Results   Component Value Date     (L) 06/02/2023    K 3.8 06/02/2023    CL 94 (L) 06/02/2023    CO2 25 06/02/2023    BUN 16 06/02/2023    CREATININE 1.1 06/02/2023    GLUCOSE 135 (H) 06/02/2023    CALCIUM 9.4 06/02/2023    PROT 7.7 01/12/2024    LABALBU 4.8 01/12/2024    BILITOT 0.4 01/12/2024    ALKPHOS 53 01/12/2024    AST 23 01/12/2024    ALT 30 01/12/2024    LABGLOM >60 06/02/2023       Last Thyroid:    Lab Results   Component Value Date    TSH 2.742 02/16/2023    T4FREE 0.78 02/16/2023     Last Hemoglobin A1C:    Lab Results   Component Value Date/Time    LABA1C 5.8 10/05/2023 08:40 AM    LABA1C 5.8 10/05/2023 06:57 AM       Rx verified, ordered and set to EP.

## 2024-04-18 ENCOUNTER — NURSE ONLY (OUTPATIENT)
Dept: UROLOGY | Age: 64
End: 2024-04-18
Payer: COMMERCIAL

## 2024-04-18 DIAGNOSIS — E29.1 HYPOGONADISM IN MALE: Primary | ICD-10-CM

## 2024-04-18 PROCEDURE — 96372 THER/PROPH/DIAG INJ SC/IM: CPT | Performed by: NURSE PRACTITIONER

## 2024-04-18 RX ORDER — TESTOSTERONE CYPIONATE 200 MG/ML
200 INJECTION, SOLUTION INTRAMUSCULAR ONCE
Status: COMPLETED | OUTPATIENT
Start: 2024-04-18 | End: 2024-04-18

## 2024-04-18 RX ADMIN — TESTOSTERONE CYPIONATE 200 MG: 200 INJECTION, SOLUTION INTRAMUSCULAR at 07:53

## 2024-04-18 NOTE — PROGRESS NOTES
DIAGNOSIS: Testosterone deficiency    200mg/ml Testosterone Cypionate administered with 22 gaugeG 1 inch needle.      Patient has given me verbal consent to perform Testosterone Injection.  Yes     Following Radha ALVARADO plan of care  Testosterone 200mg/ml GIVEN I.M. left UOQ hip- administered 1 ml of testosterone to patient  Lot Number: [id number]  [facility] #: 6541-1706-59  Exp: 09/20/2026     Patient supplied his own medications  Yes     Patient to return in 2 weeks for next injection.

## 2024-05-02 ENCOUNTER — NURSE ONLY (OUTPATIENT)
Dept: UROLOGY | Age: 64
End: 2024-05-02
Payer: COMMERCIAL

## 2024-05-02 DIAGNOSIS — E29.1 HYPOGONADISM IN MALE: Primary | ICD-10-CM

## 2024-05-02 PROCEDURE — 96372 THER/PROPH/DIAG INJ SC/IM: CPT | Performed by: NURSE PRACTITIONER

## 2024-05-02 RX ORDER — TESTOSTERONE CYPIONATE 200 MG/ML
200 INJECTION, SOLUTION INTRAMUSCULAR ONCE
Status: COMPLETED | OUTPATIENT
Start: 2024-05-02 | End: 2024-05-02

## 2024-05-02 RX ADMIN — TESTOSTERONE CYPIONATE 200 MG: 200 INJECTION, SOLUTION INTRAMUSCULAR at 08:08

## 2024-05-02 NOTE — PROGRESS NOTES
I have personally  reviewed, approved, and agree with plan..    Labs are to be completed again in July 2024.    Keep follow up in July 2024 with Vipul Duron.

## 2024-05-02 NOTE — PROGRESS NOTES
DIAGNOSIS: Testosterone    200mg/ml Testosterone Cypionate administered with 22 gaugeG 1 inch needle.      Patient has given me verbal consent to perform Testosterone Injection.  Yes     Following Radha ALVARADO plan of care  Testosterone 200mg/ml GIVEN I.M. right UOQ hip- administered 1 ml of testosterone to patient  Lot Number: [id number]  [facility] #: [id number]  Exp: 9/30/2026     Patient supplied his own medications  Yes     Patient to return in 2 weeks for next injection.

## 2024-05-17 ENCOUNTER — NURSE ONLY (OUTPATIENT)
Dept: UROLOGY | Age: 64
End: 2024-05-17

## 2024-05-17 DIAGNOSIS — R79.89 LOW TESTOSTERONE: ICD-10-CM

## 2024-05-17 DIAGNOSIS — E29.1 HYPOGONADISM IN MALE: Primary | ICD-10-CM

## 2024-05-17 RX ORDER — TESTOSTERONE CYPIONATE 200 MG/ML
200 INJECTION, SOLUTION INTRAMUSCULAR ONCE
Status: COMPLETED | OUTPATIENT
Start: 2024-05-17 | End: 2024-05-17

## 2024-05-17 RX ADMIN — TESTOSTERONE CYPIONATE 200 MG: 200 INJECTION, SOLUTION INTRAMUSCULAR at 08:27

## 2024-05-17 NOTE — PROGRESS NOTES
DIAGNOSIS: Low testosterone    200mg/ml Testosterone Cypionate administered with 22 gaugeG 1 inch needle.      Patient has given me verbal consent to perform Testosterone Injection.  Yes     Following Nickie WHEELER plan of care  Testosterone 200mg/ml GIVEN I.M. left UOQ hip- administered 1 ml of testosterone to patient  Lot Number: [id number]  NDC #: 4689-3216-90  Exp: 10/20/2026     Patient supplied his own medications  Yes     Patient to return in 2 weeks for next injection.

## 2024-05-30 DIAGNOSIS — E29.1 HYPOGONADISM IN MALE: ICD-10-CM

## 2024-05-30 RX ORDER — TESTOSTERONE CYPIONATE 200 MG/ML
200 INJECTION, SOLUTION INTRAMUSCULAR
Qty: 4 ML | Refills: 1 | Status: SHIPPED | OUTPATIENT
Start: 2024-05-30 | End: 2024-07-25 | Stop reason: SDUPTHER

## 2024-05-30 NOTE — TELEPHONE ENCOUNTER
Jamal D Watson called requesting a refill on the following medications:  Requested Prescriptions     Pending Prescriptions Disp Refills    testosterone cypionate (DEPOTESTOTERONE CYPIONATE) 200 MG/ML injection 12 mL 0     Sig: Inject 1 mL into the muscle every 14 days for 180 days. Max Daily Amount: 200 mg     Pharmacy verified: [address]  [facility]      Date of last visit: 5/17/2024  Date of next visit (if applicable): Visit date not found

## 2024-05-31 ENCOUNTER — NURSE ONLY (OUTPATIENT)
Dept: UROLOGY | Age: 64
End: 2024-05-31
Payer: COMMERCIAL

## 2024-05-31 DIAGNOSIS — E29.1 HYPOGONADISM IN MALE: Primary | ICD-10-CM

## 2024-05-31 PROCEDURE — 96372 THER/PROPH/DIAG INJ SC/IM: CPT

## 2024-05-31 RX ORDER — TESTOSTERONE CYPIONATE 200 MG/ML
200 INJECTION, SOLUTION INTRAMUSCULAR ONCE
Status: COMPLETED | OUTPATIENT
Start: 2024-05-31 | End: 2024-05-31

## 2024-05-31 RX ADMIN — TESTOSTERONE CYPIONATE 200 MG: 200 INJECTION, SOLUTION INTRAMUSCULAR at 08:46

## 2024-05-31 NOTE — PROGRESS NOTES
I have personally verified, reviewed, and approved these actions.    F/u as scheduled for repeat injection and with Vipul Duron. He will need labs at his f/u with Vipul Duron PA-C

## 2024-05-31 NOTE — PROGRESS NOTES
DIAGNOSIS: Low testosterone    200mg/ml Testosterone Cypionate administered with 22 gaugeG 1 inch needle.      Patient has given me verbal consent to perform Testosterone Injection.  Yes     Following Nickie WHEELER plan of care  Testosterone 200mg/ml GIVEN I.M. right UOQ hip- administered 1 ml of testosterone to patient  Lot Number: [id number]  NDC #: 6909-5577-94  Exp: 01/30/27     Patient supplied his own medications  Yes     Patient to return in 2 weeks for next injection.

## 2024-06-03 ENCOUNTER — TELEPHONE (OUTPATIENT)
Dept: FAMILY MEDICINE CLINIC | Age: 64
End: 2024-06-03

## 2024-06-03 NOTE — TELEPHONE ENCOUNTER
Pt is asking for lab orders, states it is time for his annual phy.  Pt is going to [facility] for lab draw.

## 2024-06-03 NOTE — TELEPHONE ENCOUNTER
He was given orders on 10/5/23 for labs to do before his Physical.  (FLP, CMP, HbA1C).  OK to reprint those and send to the lab if needed. CG

## 2024-06-06 ENCOUNTER — NURSE ONLY (OUTPATIENT)
Dept: LAB | Age: 64
End: 2024-06-06

## 2024-06-06 DIAGNOSIS — E78.1 HYPERTRIGLYCERIDEMIA: ICD-10-CM

## 2024-06-06 DIAGNOSIS — I10 PRIMARY HYPERTENSION: ICD-10-CM

## 2024-06-06 DIAGNOSIS — R73.01 IFG (IMPAIRED FASTING GLUCOSE): ICD-10-CM

## 2024-06-06 LAB
ALBUMIN SERPL BCG-MCNC: 4.7 G/DL (ref 3.5–5.1)
ALP SERPL-CCNC: 54 U/L (ref 38–126)
ALT SERPL W/O P-5'-P-CCNC: 24 U/L (ref 11–66)
ANION GAP SERPL CALC-SCNC: 10 MEQ/L (ref 8–16)
AST SERPL-CCNC: 25 U/L (ref 5–40)
BILIRUB SERPL-MCNC: 1 MG/DL (ref 0.3–1.2)
BUN SERPL-MCNC: 10 MG/DL (ref 7–22)
CALCIUM SERPL-MCNC: 9.6 MG/DL (ref 8.5–10.5)
CHLORIDE SERPL-SCNC: 89 MEQ/L (ref 98–111)
CHOLEST SERPL-MCNC: 212 MG/DL (ref 100–199)
CO2 SERPL-SCNC: 28 MEQ/L (ref 23–33)
CREAT SERPL-MCNC: 1 MG/DL (ref 0.4–1.2)
DEPRECATED MEAN GLUCOSE BLD GHB EST-ACNC: 120 MG/DL (ref 70–126)
GFR SERPL CREATININE-BSD FRML MDRD: 84 ML/MIN/1.73M2
GLUCOSE SERPL-MCNC: 107 MG/DL (ref 70–108)
HBA1C MFR BLD HPLC: 6 % (ref 4.4–6.4)
HDLC SERPL-MCNC: 31 MG/DL
LDLC SERPL CALC-MCNC: 118 MG/DL
POTASSIUM SERPL-SCNC: 4.9 MEQ/L (ref 3.5–5.2)
PROT SERPL-MCNC: 7.8 G/DL (ref 6.1–8)
SODIUM SERPL-SCNC: 127 MEQ/L (ref 135–145)
TRIGL SERPL-MCNC: 313 MG/DL (ref 0–199)

## 2024-06-14 ENCOUNTER — NURSE ONLY (OUTPATIENT)
Dept: UROLOGY | Age: 64
End: 2024-06-14
Payer: COMMERCIAL

## 2024-06-14 DIAGNOSIS — E29.1 HYPOGONADISM IN MALE: Primary | ICD-10-CM

## 2024-06-14 DIAGNOSIS — E29.1 TESTOSTERONE DEFICIENCY IN MALE: ICD-10-CM

## 2024-06-14 DIAGNOSIS — R79.89 LOW TESTOSTERONE: ICD-10-CM

## 2024-06-14 PROCEDURE — 96372 THER/PROPH/DIAG INJ SC/IM: CPT

## 2024-06-14 RX ORDER — TESTOSTERONE CYPIONATE 200 MG/ML
200 INJECTION, SOLUTION INTRAMUSCULAR ONCE
Status: COMPLETED | OUTPATIENT
Start: 2024-06-14 | End: 2024-06-14

## 2024-06-14 RX ADMIN — TESTOSTERONE CYPIONATE 200 MG: 200 INJECTION, SOLUTION INTRAMUSCULAR at 08:32

## 2024-06-14 NOTE — PROGRESS NOTES
I have personally verified, reviewed, and approved of these actions and the plan for follow-up as documented.

## 2024-06-14 NOTE — PROGRESS NOTES
DIAGNOSIS: Low testosterone    200mg/ml Testosterone Cypionate administered with 22 gaugeG 1 inch needle.      Patient has given me verbal consent to perform Testosterone Injection.  Yes     Following Vipul WHEELER plan of care  Testosterone 200mg/ml GIVEN I.M. left UOQ hip- administered 1 ml of testosterone to patient  Lot Number: [id number]  NDC #: [id number]  Exp: 01/20/2027     Patient supplied his own medications  Yes     Patient to return in 2 weeks for next injection.

## 2024-06-20 DIAGNOSIS — I10 ESSENTIAL HYPERTENSION: ICD-10-CM

## 2024-06-20 DIAGNOSIS — K21.9 GASTROESOPHAGEAL REFLUX DISEASE, UNSPECIFIED WHETHER ESOPHAGITIS PRESENT: ICD-10-CM

## 2024-06-20 RX ORDER — METOPROLOL SUCCINATE 100 MG/1
100 TABLET, EXTENDED RELEASE ORAL DAILY
Qty: 90 TABLET | Refills: 3 | Status: SHIPPED | OUTPATIENT
Start: 2024-06-20

## 2024-06-20 RX ORDER — VALSARTAN AND HYDROCHLOROTHIAZIDE 320; 25 MG/1; MG/1
1 TABLET, FILM COATED ORAL DAILY
Qty: 90 TABLET | Refills: 3 | Status: SHIPPED | OUTPATIENT
Start: 2024-06-20

## 2024-06-20 RX ORDER — ESOMEPRAZOLE MAGNESIUM 40 MG/1
CAPSULE, DELAYED RELEASE ORAL
Qty: 90 CAPSULE | Refills: 3 | Status: SHIPPED | OUTPATIENT
Start: 2024-06-20

## 2024-06-20 NOTE — TELEPHONE ENCOUNTER
This medication refill is regarding an electronic request. Refill requested by [facility].    Requested Prescriptions     Pending Prescriptions Disp Refills    metoprolol succinate (TOPROL XL) 100 MG extended release tablet [Pharmacy Med Name: METOPROLOL ER SUCCINATE 100MG TABS] 90 tablet 3     Sig: TAKE 1 TABLET BY MOUTH DAILY    valsartan-hydroCHLOROthiazide (DIOVAN-HCT) 320-25 MG per tablet [Pharmacy Med Name: VALSARTAN/HCTZ 320MG/25MG TABLETS] 90 tablet 3     Sig: TAKE 1 TABLET BY MOUTH DAILY    esomeprazole (NEXIUM) 40 MG delayed release capsule [Pharmacy Med Name: ESOMEPRAZOLE MAGNESIUM 40MG DR CAPS] 90 capsule 3     Sig: TAKE 1 CAPSULE BY MOUTH EVERY MORNING BEFORE BREAKFAST       Date of last visit: 3/25/2024   Date of next visit: 7/8/2024  Date of last refill: 6/19/23 for 90/3  Pharmacy Name: [facility]    Last CMP:   Lab Results   Component Value Date     (L) 06/06/2024    K 4.9 06/06/2024    CL 89 (L) 06/06/2024    CO2 28 06/06/2024    BUN 10 06/06/2024    CREATININE 1.0 06/06/2024    GLUCOSE 107 06/06/2024    CALCIUM 9.6 06/06/2024    PROT 7.2 07/25/2017    BILITOT 1.0 06/06/2024    ALKPHOS 54 06/06/2024    AST 25 06/06/2024    ALT 24 06/06/2024    LABGLOM 84 06/06/2024       Rx's verified, ordered and set to EP.     FELICIA

## 2024-06-28 ENCOUNTER — NURSE ONLY (OUTPATIENT)
Dept: UROLOGY | Age: 64
End: 2024-06-28
Payer: COMMERCIAL

## 2024-06-28 DIAGNOSIS — E29.1 HYPOGONADISM IN MALE: Primary | ICD-10-CM

## 2024-06-28 DIAGNOSIS — E29.1 TESTOSTERONE DEFICIENCY IN MALE: ICD-10-CM

## 2024-06-28 DIAGNOSIS — R79.89 LOW TESTOSTERONE: ICD-10-CM

## 2024-06-28 PROCEDURE — 96372 THER/PROPH/DIAG INJ SC/IM: CPT | Performed by: NURSE PRACTITIONER

## 2024-06-28 RX ORDER — TESTOSTERONE CYPIONATE 200 MG/ML
200 INJECTION, SOLUTION INTRAMUSCULAR ONCE
Status: COMPLETED | OUTPATIENT
Start: 2024-06-28 | End: 2024-06-28

## 2024-06-28 RX ADMIN — TESTOSTERONE CYPIONATE 200 MG: 200 INJECTION, SOLUTION INTRAMUSCULAR at 07:56

## 2024-06-28 NOTE — PROGRESS NOTES
DIAGNOSIS: Low testosterone     200mg/ml Testosterone Cypionate administered with 22 gaugeG 1 inch needle.      Patient has given me verbal consent to perform Testosterone Injection.  Yes     Following Vipul WHEELER plan of care  Testosterone 200mg/ml GIVEN I.M. left UOQ hip- administered 1 ml of testosterone to patient  Lot Number: [id number]  NDC #: 44016976-09  Exp: 01/31/2027     Patient supplied his own medications  Yes     Patient to return in 2 weeks for next injection

## 2024-07-08 ENCOUNTER — OFFICE VISIT (OUTPATIENT)
Dept: FAMILY MEDICINE CLINIC | Age: 64
End: 2024-07-08
Payer: COMMERCIAL

## 2024-07-08 VITALS
RESPIRATION RATE: 16 BRPM | OXYGEN SATURATION: 98 % | SYSTOLIC BLOOD PRESSURE: 136 MMHG | HEART RATE: 62 BPM | HEIGHT: 69 IN | WEIGHT: 212.8 LBS | DIASTOLIC BLOOD PRESSURE: 82 MMHG | TEMPERATURE: 97.5 F | BODY MASS INDEX: 31.52 KG/M2

## 2024-07-08 DIAGNOSIS — Z00.00 ANNUAL PHYSICAL EXAM: Primary | ICD-10-CM

## 2024-07-08 DIAGNOSIS — E87.1 HYPONATREMIA: ICD-10-CM

## 2024-07-08 PROCEDURE — 99396 PREV VISIT EST AGE 40-64: CPT | Performed by: FAMILY MEDICINE

## 2024-07-08 PROCEDURE — 3075F SYST BP GE 130 - 139MM HG: CPT | Performed by: FAMILY MEDICINE

## 2024-07-08 PROCEDURE — 3079F DIAST BP 80-89 MM HG: CPT | Performed by: FAMILY MEDICINE

## 2024-07-08 SDOH — ECONOMIC STABILITY: FOOD INSECURITY: WITHIN THE PAST 12 MONTHS, YOU WORRIED THAT YOUR FOOD WOULD RUN OUT BEFORE YOU GOT MONEY TO BUY MORE.: NEVER TRUE

## 2024-07-08 SDOH — ECONOMIC STABILITY: FOOD INSECURITY: WITHIN THE PAST 12 MONTHS, THE FOOD YOU BOUGHT JUST DIDN'T LAST AND YOU DIDN'T HAVE MONEY TO GET MORE.: NEVER TRUE

## 2024-07-08 SDOH — ECONOMIC STABILITY: INCOME INSECURITY: HOW HARD IS IT FOR YOU TO PAY FOR THE VERY BASICS LIKE FOOD, HOUSING, MEDICAL CARE, AND HEATING?: NOT HARD AT ALL

## 2024-07-08 ASSESSMENT — ENCOUNTER SYMPTOMS
EYES NEGATIVE: 1
BLOOD IN STOOL: 0
SHORTNESS OF BREATH: 0
ABDOMINAL PAIN: 0
CHEST TIGHTNESS: 0

## 2024-07-08 NOTE — PROGRESS NOTES
physical activity    Continue current medications for hypertension.  His blood pressure stable and well-controlled.    Form completed for his insurance today    Follow-up yearly and as needed           --Radha Cardenas MD

## 2024-07-12 ENCOUNTER — NURSE ONLY (OUTPATIENT)
Dept: UROLOGY | Age: 64
End: 2024-07-12
Payer: COMMERCIAL

## 2024-07-12 DIAGNOSIS — R79.89 LOW TESTOSTERONE: Primary | ICD-10-CM

## 2024-07-12 PROCEDURE — 96372 THER/PROPH/DIAG INJ SC/IM: CPT

## 2024-07-12 RX ORDER — TESTOSTERONE CYPIONATE 200 MG/ML
200 INJECTION, SOLUTION INTRAMUSCULAR ONCE
Status: COMPLETED | OUTPATIENT
Start: 2024-07-12 | End: 2024-07-12

## 2024-07-12 RX ADMIN — TESTOSTERONE CYPIONATE 200 MG: 200 INJECTION, SOLUTION INTRAMUSCULAR at 08:32

## 2024-07-12 NOTE — PROGRESS NOTES
Diagnosis: Low Testosterone in Male    After receiving verbal consent to perform his testosterone injection, I personally injected 1 mL (200 mg) of Testosterone Cypionate with a 22 g- 1\" needle.     Testosterone 200 mg/mL IM into the right upper outer glute (dorsogluteus).    Lot #: [id number]  NDC#: 2829-1224-96  Exp: 01/2027

## 2024-07-23 ENCOUNTER — LAB (OUTPATIENT)
Dept: LAB | Age: 64
End: 2024-07-23

## 2024-07-23 DIAGNOSIS — E29.1 HYPOGONADISM IN MALE: ICD-10-CM

## 2024-07-23 LAB
ALBUMIN SERPL BCG-MCNC: 4.9 G/DL (ref 3.5–5.1)
ALP SERPL-CCNC: 54 U/L (ref 38–126)
ALT SERPL W/O P-5'-P-CCNC: 34 U/L (ref 11–66)
AST SERPL-CCNC: 30 U/L (ref 5–40)
BILIRUB CONJ SERPL-MCNC: < 0.1 MG/DL (ref 0.1–13.8)
BILIRUB SERPL-MCNC: 0.5 MG/DL (ref 0.3–1.2)
HCT VFR BLD AUTO: 49.3 % (ref 42–52)
HGB BLD-MCNC: 16.9 GM/DL (ref 14–18)
PROT SERPL-MCNC: 7.3 G/DL (ref 6.1–8)
PSA SERPL-MCNC: 0.37 NG/ML (ref 0–1)

## 2024-07-25 ENCOUNTER — OFFICE VISIT (OUTPATIENT)
Dept: UROLOGY | Age: 64
End: 2024-07-25
Payer: COMMERCIAL

## 2024-07-25 VITALS
DIASTOLIC BLOOD PRESSURE: 76 MMHG | SYSTOLIC BLOOD PRESSURE: 130 MMHG | HEIGHT: 69 IN | BODY MASS INDEX: 32.29 KG/M2 | WEIGHT: 218 LBS

## 2024-07-25 DIAGNOSIS — R79.89 LOW TESTOSTERONE: Primary | ICD-10-CM

## 2024-07-25 DIAGNOSIS — E29.1 HYPOGONADISM IN MALE: ICD-10-CM

## 2024-07-25 DIAGNOSIS — N52.9 ERECTILE DYSFUNCTION, UNSPECIFIED ERECTILE DYSFUNCTION TYPE: ICD-10-CM

## 2024-07-25 LAB — TESTOST SERPL-MCNC: 583 NG/DL (ref 300–720)

## 2024-07-25 PROCEDURE — 3075F SYST BP GE 130 - 139MM HG: CPT

## 2024-07-25 PROCEDURE — G8427 DOCREV CUR MEDS BY ELIG CLIN: HCPCS

## 2024-07-25 PROCEDURE — G8417 CALC BMI ABV UP PARAM F/U: HCPCS

## 2024-07-25 PROCEDURE — 3017F COLORECTAL CA SCREEN DOC REV: CPT

## 2024-07-25 PROCEDURE — 3078F DIAST BP <80 MM HG: CPT

## 2024-07-25 PROCEDURE — 1036F TOBACCO NON-USER: CPT

## 2024-07-25 PROCEDURE — 99213 OFFICE O/P EST LOW 20 MIN: CPT

## 2024-07-25 RX ORDER — TESTOSTERONE CYPIONATE 200 MG/ML
200 INJECTION, SOLUTION INTRAMUSCULAR
Qty: 4 ML | Refills: 3 | Status: SHIPPED | OUTPATIENT
Start: 2024-07-25 | End: 2025-02-21

## 2024-07-25 RX ORDER — SILDENAFIL 100 MG/1
100 TABLET, FILM COATED ORAL DAILY PRN
Qty: 30 TABLET | Refills: 3 | Status: CANCELLED | OUTPATIENT
Start: 2024-07-25

## 2024-07-25 NOTE — PROGRESS NOTES
[facility] [place] [facility]  [facility] UROLOGY  [address]  [address]  [facility] [address]  Dept: [phone]  Loc: [phone]  Visit Date: 7/25/2024        HPI  Jamal Greene is a 63 y.o. male that presents to the urology clinic for erectile dysfunction.    Patient following with our office for management of his TRT. Doing well on current regimen of 200 mg (1 mL) IM of Testosterone Cypionate in the office every 14 days. +Benefit in libido, energy, and muscle maintenance.    Also following for management of ED. Not at goal with Sildenafil 100 mg PRN. Bothersome.    Most Recent PSA: 0.37 (01/12/24)      Last Total Testosterone:  Lab Results   Component Value Date    TESTOSTERONE 184 (L) 01/12/2024       Last BUN and Creatinine:  Lab Results   Component Value Date    BUN 10 06/06/2024     Lab Results   Component Value Date    CREATININE 1.0 06/06/2024           PAST MEDICAL, FAMILY AND SOCIAL HISTORY UPDATE:  Past Medical History:   Diagnosis Date    Depression     Genital herpes     GERD (gastroesophageal reflux disease)     Hematospermia     Hyperlipidemia     Hypertension     OCD (obsessive compulsive disorder)     UTI (urinary tract infection)      Past Surgical History:   Procedure Laterality Date    ENDOSCOPY, COLON, DIAGNOSTIC      SHOULDER SURGERY Left 11/18/2019    Dr. Hector    SHOULDER SURGERY Right 12/2019     Family History   Problem Relation Age of Onset    Cancer Father         Lung    Heart Attack Paternal Grandfather      Outpatient Medications Marked as Taking for the 7/25/24 encounter (Office Visit) with Vipul Duron PA-C   Medication Sig Dispense Refill    metoprolol succinate (TOPROL XL) 100 MG extended release tablet TAKE 1 TABLET BY MOUTH DAILY 90 tablet 3    valsartan-hydroCHLOROthiazide (DIOVAN-HCT) 320-25 MG per tablet TAKE 1 TABLET BY MOUTH DAILY 90 tablet 3    esomeprazole (NEXIUM) 40 MG delayed release capsule TAKE 1 CAPSULE BY MOUTH EVERY MORNING BEFORE

## 2024-08-01 ENCOUNTER — NURSE ONLY (OUTPATIENT)
Dept: UROLOGY | Age: 64
End: 2024-08-01
Payer: COMMERCIAL

## 2024-08-01 DIAGNOSIS — R79.89 LOW TESTOSTERONE: ICD-10-CM

## 2024-08-01 DIAGNOSIS — E29.1 HYPOGONADISM IN MALE: Primary | ICD-10-CM

## 2024-08-01 PROCEDURE — 96372 THER/PROPH/DIAG INJ SC/IM: CPT | Performed by: NURSE PRACTITIONER

## 2024-08-01 RX ORDER — TESTOSTERONE CYPIONATE 200 MG/ML
200 INJECTION, SOLUTION INTRAMUSCULAR ONCE
Status: COMPLETED | OUTPATIENT
Start: 2024-08-01 | End: 2024-08-01

## 2024-08-01 RX ADMIN — TESTOSTERONE CYPIONATE 200 MG: 200 INJECTION, SOLUTION INTRAMUSCULAR at 08:29

## 2024-08-01 NOTE — PROGRESS NOTES
DIAGNOSIS: hypogonadism, low testosterone in male    200mg/ml Testosterone Cypionate administered with 22 gaugeG 1 inch needle.      Patient has given me verbal consent to perform Testosterone Injection.  Yes     Following Vipul WHEELER plan of care  Testosterone 200mg/ml GIVEN I.M. left deltoid- administered 1 ml of testosterone to patient  Lot Number: [id number]  NDC #: 0891-0951-96  Exp: jan 2026     Patient supplied his own medications  Yes     Patient to return in 2 weeks for next injection.

## 2024-08-14 ENCOUNTER — NURSE ONLY (OUTPATIENT)
Dept: UROLOGY | Age: 64
End: 2024-08-14
Payer: COMMERCIAL

## 2024-08-14 DIAGNOSIS — E29.1 HYPOGONADISM IN MALE: Primary | ICD-10-CM

## 2024-08-14 PROCEDURE — 96372 THER/PROPH/DIAG INJ SC/IM: CPT

## 2024-08-14 RX ORDER — TESTOSTERONE CYPIONATE 200 MG/ML
200 INJECTION, SOLUTION INTRAMUSCULAR ONCE
Status: COMPLETED | OUTPATIENT
Start: 2024-08-14 | End: 2024-08-14

## 2024-08-14 RX ADMIN — TESTOSTERONE CYPIONATE 200 MG: 200 INJECTION, SOLUTION INTRAMUSCULAR at 08:22

## 2024-08-14 NOTE — PROGRESS NOTES
DIAGNOSIS: hypogonadism, low testosterone in male     200mg/ml Testosterone Cypionate administered with 22 gaugeG 1 inch needle.      Patient has given me verbal consent to perform Testosterone Injection.  Yes     Following Nickie WHEELER plan of care  Testosterone 200mg/ml GIVEN I.M. right UOQ hip- administered 1 ml of testosterone to patient  Lot Number: [id number]  NDC #: 0779-2524-79   Exp: 2/2026     Patient supplied his own medications  Yes     Patient to return in 2 weeks for next injection.

## 2024-08-28 ENCOUNTER — NURSE ONLY (OUTPATIENT)
Dept: UROLOGY | Age: 64
End: 2024-08-28
Payer: COMMERCIAL

## 2024-08-28 DIAGNOSIS — E29.1 HYPOGONADISM IN MALE: Primary | ICD-10-CM

## 2024-08-28 DIAGNOSIS — R79.89 LOW TESTOSTERONE: ICD-10-CM

## 2024-08-28 PROCEDURE — 96372 THER/PROPH/DIAG INJ SC/IM: CPT

## 2024-08-28 RX ORDER — TESTOSTERONE CYPIONATE 200 MG/ML
200 INJECTION, SOLUTION INTRAMUSCULAR ONCE
Status: COMPLETED | OUTPATIENT
Start: 2024-08-28 | End: 2024-08-28

## 2024-08-28 RX ADMIN — TESTOSTERONE CYPIONATE 200 MG: 200 INJECTION, SOLUTION INTRAMUSCULAR at 08:13

## 2024-08-28 NOTE — PROGRESS NOTES
DIAGNOSIS: hypogonadism, low testosterone in male      200mg/ml Testosterone Cypionate administered with 22 gaugeG 1 inch needle.      Patient has given me verbal consent to perform Testosterone Injection.  Yes     Following Nickie WHEELER plan of care  Testosterone 200mg/ml GIVEN I.M. Left UOQ hip- administered 1 ml of testosterone to patient  Lot Number: [id number]  NDC #: 9423-4770-25   Exp: 1/2026     Patient supplied his own medications  Yes     Patient to return in 2 weeks for next injection.

## 2024-09-11 ENCOUNTER — NURSE ONLY (OUTPATIENT)
Dept: UROLOGY | Age: 64
End: 2024-09-11
Payer: COMMERCIAL

## 2024-09-11 DIAGNOSIS — E29.1 HYPOGONADISM IN MALE: Primary | ICD-10-CM

## 2024-09-11 DIAGNOSIS — R79.89 LOW TESTOSTERONE: ICD-10-CM

## 2024-09-11 PROCEDURE — 96372 THER/PROPH/DIAG INJ SC/IM: CPT | Performed by: NURSE PRACTITIONER

## 2024-09-11 RX ORDER — TESTOSTERONE CYPIONATE 200 MG/ML
200 INJECTION, SOLUTION INTRAMUSCULAR ONCE
Status: COMPLETED | OUTPATIENT
Start: 2024-09-11 | End: 2024-09-11

## 2024-09-11 RX ADMIN — TESTOSTERONE CYPIONATE 200 MG: 200 INJECTION, SOLUTION INTRAMUSCULAR at 08:35

## 2024-09-22 DIAGNOSIS — B00.9 HERPES INFECTION: ICD-10-CM

## 2024-09-23 RX ORDER — VALACYCLOVIR HYDROCHLORIDE 500 MG/1
500 TABLET, FILM COATED ORAL DAILY
Qty: 90 TABLET | Refills: 3 | Status: SHIPPED | OUTPATIENT
Start: 2024-09-23

## 2024-09-25 ENCOUNTER — NURSE ONLY (OUTPATIENT)
Dept: UROLOGY | Age: 64
End: 2024-09-25
Payer: COMMERCIAL

## 2024-09-25 DIAGNOSIS — E29.1 HYPOGONADISM IN MALE: Primary | ICD-10-CM

## 2024-09-25 DIAGNOSIS — R79.89 LOW TESTOSTERONE: ICD-10-CM

## 2024-09-25 PROCEDURE — 96372 THER/PROPH/DIAG INJ SC/IM: CPT | Performed by: NURSE PRACTITIONER

## 2024-09-25 RX ORDER — TESTOSTERONE CYPIONATE 200 MG/ML
200 INJECTION, SOLUTION INTRAMUSCULAR ONCE
Status: COMPLETED | OUTPATIENT
Start: 2024-09-25 | End: 2024-09-25

## 2024-09-25 RX ADMIN — TESTOSTERONE CYPIONATE 200 MG: 200 INJECTION, SOLUTION INTRAMUSCULAR at 08:37

## 2024-10-09 ENCOUNTER — NURSE ONLY (OUTPATIENT)
Dept: UROLOGY | Age: 64
End: 2024-10-09

## 2024-10-09 DIAGNOSIS — E29.1 HYPOGONADISM IN MALE: Primary | ICD-10-CM

## 2024-10-09 DIAGNOSIS — R79.89 LOW TESTOSTERONE: ICD-10-CM

## 2024-10-09 RX ORDER — TESTOSTERONE CYPIONATE 200 MG/ML
200 INJECTION, SOLUTION INTRAMUSCULAR ONCE
Status: COMPLETED | OUTPATIENT
Start: 2024-10-09 | End: 2024-10-09

## 2024-10-09 RX ADMIN — TESTOSTERONE CYPIONATE 200 MG: 200 INJECTION, SOLUTION INTRAMUSCULAR at 08:25

## 2024-10-09 NOTE — PROGRESS NOTES
DIAGNOSIS: hypogonadism in male, low testosterone     200mg/ml Testosterone Cypionate administered with 22 gaugeG 1 inch needle.      Patient has given me verbal consent to perform Testosterone Injection.  Yes     Following Jessica ALVARADO plan of care  Testosterone 200mg/ml GIVEN I.M. right UOQ hip- administered 1 ml of testosterone to patient  Lot Number: [id number]  NDC #: 1570-7416-43  Exp: 05/31/2027     Patient supplied his own medications  Yes     Patient to return in 2 weeks for next injection.      Jessica ALVARADO-[facility]  Urology

## 2024-10-21 ENCOUNTER — OFFICE VISIT (OUTPATIENT)
Dept: FAMILY MEDICINE CLINIC | Age: 64
End: 2024-10-21
Payer: COMMERCIAL

## 2024-10-21 VITALS
TEMPERATURE: 97.6 F | RESPIRATION RATE: 16 BRPM | BODY MASS INDEX: 32 KG/M2 | SYSTOLIC BLOOD PRESSURE: 170 MMHG | DIASTOLIC BLOOD PRESSURE: 86 MMHG | WEIGHT: 216.8 LBS | HEART RATE: 64 BPM

## 2024-10-21 DIAGNOSIS — M54.31 SCIATICA OF RIGHT SIDE: Primary | ICD-10-CM

## 2024-10-21 PROCEDURE — 3077F SYST BP >= 140 MM HG: CPT | Performed by: FAMILY MEDICINE

## 2024-10-21 PROCEDURE — 96372 THER/PROPH/DIAG INJ SC/IM: CPT | Performed by: FAMILY MEDICINE

## 2024-10-21 PROCEDURE — 3017F COLORECTAL CA SCREEN DOC REV: CPT | Performed by: FAMILY MEDICINE

## 2024-10-21 PROCEDURE — G8427 DOCREV CUR MEDS BY ELIG CLIN: HCPCS | Performed by: FAMILY MEDICINE

## 2024-10-21 PROCEDURE — 1036F TOBACCO NON-USER: CPT | Performed by: FAMILY MEDICINE

## 2024-10-21 PROCEDURE — 99213 OFFICE O/P EST LOW 20 MIN: CPT | Performed by: FAMILY MEDICINE

## 2024-10-21 PROCEDURE — G8417 CALC BMI ABV UP PARAM F/U: HCPCS | Performed by: FAMILY MEDICINE

## 2024-10-21 PROCEDURE — G8484 FLU IMMUNIZE NO ADMIN: HCPCS | Performed by: FAMILY MEDICINE

## 2024-10-21 PROCEDURE — 3079F DIAST BP 80-89 MM HG: CPT | Performed by: FAMILY MEDICINE

## 2024-10-21 RX ORDER — METHYLPREDNISOLONE ACETATE 80 MG/ML
80 INJECTION, SUSPENSION INTRA-ARTICULAR; INTRALESIONAL; INTRAMUSCULAR; SOFT TISSUE ONCE
Status: COMPLETED | OUTPATIENT
Start: 2024-10-21 | End: 2024-10-21

## 2024-10-21 RX ORDER — METHYLPREDNISOLONE 4 MG/1
TABLET ORAL
Qty: 1 KIT | Refills: 0 | Status: SHIPPED | OUTPATIENT
Start: 2024-10-21 | End: 2024-10-27

## 2024-10-21 RX ADMIN — METHYLPREDNISOLONE ACETATE 80 MG: 80 INJECTION, SUSPENSION INTRA-ARTICULAR; INTRALESIONAL; INTRAMUSCULAR; SOFT TISSUE at 15:39

## 2024-10-21 ASSESSMENT — ENCOUNTER SYMPTOMS
NAUSEA: 0
BACK PAIN: 1
ABDOMINAL PAIN: 0
VOMITING: 0
RESPIRATORY NEGATIVE: 1

## 2024-10-21 NOTE — PROGRESS NOTES
10/21/2024    Jamal Greene (:  1960) is a 63 y.o. male, Established patient, here for evaluation of the following chief complaint(s):  Lower Back Pain (C/O right sided low back pain that extends into the leg x 2 weeks, worse for the past 3-4 days. )      ASSESSMENT/PLAN:    1. Sciatica of right side  -     methylPREDNISolone acetate (DEPO-MEDROL) injection 80 mg; 80 mg, IntraMUSCular, ONCE, 1 dose, On Mon 10/21/24 at 1530  -     methylPREDNISolone (MEDROL DOSEPACK) 4 MG tablet; Take by mouth as directed, Disp-1 kit, R-0Normal  -     tiZANidine (ZANAFLEX) 4 MG tablet; Take 1 tablet by mouth 3 times daily as needed (back pain), Disp-21 tablet, R-0Normal      Patient will continue with local heat, stretches, and massage as needed    Depo-Medrol 80 mg IM in the office today    Medrol Dosepak as above    Zanaflex 4 mg p.o. 3 times daily as needed for muscle spasm    Follow-up if not improved    SUBJECTIVE/OBJECTIVE:    HPI    Patient here today with complaints of a 2-week history of right low back pain radiating to the right leg.  Symptoms seem to be worse over the last 3 to 4 days.  Symptoms started when he crouched down to put air in his wife's car tire.  He felt a twinge in the right low back and has had pain ever since.  Pain is sometimes sharp and radiates down through the right buttocks and into the right lateral leg down to the calf.  He denies any weakness, numbness, or tingling.  No loss of bowel or bladder function.  He does complain of some muscle spasm and tightness which then leads to pain going down the right leg.  No fevers, chills, or sweats.  He has tried deep tissue massage and some stretches which unfortunately made his symptoms worse.  He relates that he had similar symptoms in the distant past and was treated with Depo-Medrol and a Medrol Dosepak and his symptoms resolved completely.  He is otherwise well.    Review of Systems   Constitutional:  Negative for chills, diaphoresis and

## 2024-10-21 NOTE — PROGRESS NOTES
After obtaining consent, and per orders of Dr. Cardenas, injection of Depo-Medrol 80 mg/ml 1 ml given IM right upper quadrant gluteus by RAFFAELE LOZADA CMA (AAMA). Patient tolerated well. Injection verified by Elias Encinas CMA.

## 2024-10-22 NOTE — PROGRESS NOTES
DIAGNOSIS: hypogonadism in male, low testosterone      200mg/ml Testosterone Cypionate administered with 22 gaugeG 1 inch needle.      Patient has given me verbal consent to perform Testosterone Injection.  Yes     Following Jessica ALVARADO plan of care  Testosterone 200mg/ml GIVEN I.M. left UOQ hip- administered 1 ml of testosterone to patient  Lot Number: [id number]  NDC #: [id number]  Exp: 02/28/2027     Patient supplied his own medications  Yes     Patient to return in 2 weeks for next injection.       Jessica ALVARADO-[facility]  Urology

## 2024-10-23 ENCOUNTER — NURSE ONLY (OUTPATIENT)
Dept: UROLOGY | Age: 64
End: 2024-10-23
Payer: COMMERCIAL

## 2024-10-23 DIAGNOSIS — E29.1 HYPOGONADISM IN MALE: Primary | ICD-10-CM

## 2024-10-23 DIAGNOSIS — R79.89 LOW TESTOSTERONE: ICD-10-CM

## 2024-10-23 PROCEDURE — 96372 THER/PROPH/DIAG INJ SC/IM: CPT | Performed by: NURSE PRACTITIONER

## 2024-10-23 RX ORDER — TESTOSTERONE CYPIONATE 200 MG/ML
200 INJECTION, SOLUTION INTRAMUSCULAR ONCE
Status: COMPLETED | OUTPATIENT
Start: 2024-10-23 | End: 2024-10-23

## 2024-10-23 RX ADMIN — TESTOSTERONE CYPIONATE 200 MG: 200 INJECTION, SOLUTION INTRAMUSCULAR at 08:38

## 2024-10-25 DIAGNOSIS — M54.31 SCIATICA OF RIGHT SIDE: ICD-10-CM

## 2024-10-25 NOTE — TELEPHONE ENCOUNTER
Request from [facility] [address] for refill of Zanaflex refused due to being filled 10/21/24 #21/0. Pt does not need a refill this soon.

## 2024-10-31 ENCOUNTER — TELEPHONE (OUTPATIENT)
Dept: FAMILY MEDICINE CLINIC | Age: 64
End: 2024-10-31

## 2024-10-31 DIAGNOSIS — M54.31 SCIATICA OF RIGHT SIDE: Primary | ICD-10-CM

## 2024-10-31 NOTE — TELEPHONE ENCOUNTER
LM on nurse VM stating that the right sided sciatica is not much better and would like a referral placed to Dr. Rivas at [facility]. OK for referral? Please advise.

## 2024-11-05 NOTE — PROGRESS NOTES
DIAGNOSIS: hypogonadism in male, low testosterone      200mg/ml Testosterone Cypionate administered with 22 gaugeG 1 inch needle.      Patient has given me verbal consent to perform Testosterone Injection.  Yes     Following Jessica ALVARADO plan of care  Testosterone 200mg/ml GIVEN I.M. right UOQ hip- administered 1 ml of testosterone to patient  Lot Number: [id number]  NDC #: 5625-3865-39  Exp: 5/31/2027     Patient supplied his own medications  Yes     Patient to return in 2 weeks for next injection.    Refills sent for testosterone 200mg/ml every 14 days.    Office visit 1/28/2025 with labs.      Jessica ALVARADO-[facility]  Urology

## 2024-11-06 ENCOUNTER — NURSE ONLY (OUTPATIENT)
Dept: UROLOGY | Age: 64
End: 2024-11-06
Payer: COMMERCIAL

## 2024-11-06 DIAGNOSIS — R79.89 LOW TESTOSTERONE: ICD-10-CM

## 2024-11-06 DIAGNOSIS — E29.1 HYPOGONADISM IN MALE: Primary | ICD-10-CM

## 2024-11-06 PROCEDURE — 96372 THER/PROPH/DIAG INJ SC/IM: CPT | Performed by: NURSE PRACTITIONER

## 2024-11-06 RX ORDER — TESTOSTERONE CYPIONATE 200 MG/ML
200 INJECTION, SOLUTION INTRAMUSCULAR ONCE
Status: COMPLETED | OUTPATIENT
Start: 2024-11-06 | End: 2024-11-06

## 2024-11-06 RX ORDER — TESTOSTERONE CYPIONATE 200 MG/ML
200 INJECTION, SOLUTION INTRAMUSCULAR
Qty: 4 ML | Refills: 3 | Status: SHIPPED | OUTPATIENT
Start: 2024-11-06 | End: 2025-06-05

## 2024-11-06 RX ADMIN — TESTOSTERONE CYPIONATE 200 MG: 200 INJECTION, SOLUTION INTRAMUSCULAR at 13:00

## 2024-11-07 ENCOUNTER — TELEPHONE (OUTPATIENT)
Dept: FAMILY MEDICINE CLINIC | Age: 64
End: 2024-11-07

## 2024-11-07 RX ORDER — AMLODIPINE BESYLATE 5 MG/1
5 TABLET ORAL DAILY
Qty: 90 TABLET | Refills: 1 | OUTPATIENT
Start: 2024-11-07

## 2024-11-07 RX ORDER — AMLODIPINE BESYLATE 5 MG/1
5 TABLET ORAL DAILY
Qty: 30 TABLET | Refills: 1 | Status: SHIPPED | OUTPATIENT
Start: 2024-11-07

## 2024-11-07 NOTE — TELEPHONE ENCOUNTER
BP log reviewed and blood pressures are high  in general.  Add amlodipine 5 mg daily #30/1   for better control of hypertension.Ambulatory   BPs 2-3 times a week for 3 weeks and call or   fax to the office.            Electronically signed by Radha Cardenas MD on 11/6/2024 at 11:49 AM   (Copied from )    Patient notified and instructed. Verbalized understanding.  Rx escripted per orders.

## 2024-11-12 ENCOUNTER — HOSPITAL ENCOUNTER (EMERGENCY)
Age: 64
Discharge: HOME | End: 2024-11-12
Attending: EMERGENCY MEDICINE
Payer: COMMERCIAL

## 2024-11-12 ENCOUNTER — TELEPHONE (OUTPATIENT)
Dept: FAMILY MEDICINE CLINIC | Age: 64
End: 2024-11-12

## 2024-11-12 VITALS
DIASTOLIC BLOOD PRESSURE: 88 MMHG | RESPIRATION RATE: 16 BRPM | TEMPERATURE: 97.7 F | BODY MASS INDEX: 31 KG/M2 | OXYGEN SATURATION: 98 % | WEIGHT: 210 LBS | SYSTOLIC BLOOD PRESSURE: 155 MMHG | HEART RATE: 93 BPM

## 2024-11-12 DIAGNOSIS — E87.8 ELECTROLYTE IMBALANCE: ICD-10-CM

## 2024-11-12 DIAGNOSIS — R42 LIGHTHEADEDNESS: Primary | ICD-10-CM

## 2024-11-12 LAB
ALBUMIN SERPL BCG-MCNC: 4.6 G/DL (ref 3.5–5.1)
ALP SERPL-CCNC: 47 U/L (ref 38–126)
ALT SERPL W/O P-5'-P-CCNC: 25 U/L (ref 11–66)
ANION GAP SERPL CALC-SCNC: 15 MEQ/L (ref 8–16)
AST SERPL-CCNC: 22 U/L (ref 5–40)
BACTERIA URNS QL MICRO: ABNORMAL /HPF
BASOPHILS ABSOLUTE: 0 THOU/MM3 (ref 0–0.1)
BASOPHILS NFR BLD AUTO: 0.5 %
BILIRUB SERPL-MCNC: 0.8 MG/DL (ref 0.3–1.2)
BILIRUB UR QL STRIP.AUTO: NEGATIVE
BUN SERPL-MCNC: 12 MG/DL (ref 7–22)
CALCIUM SERPL-MCNC: 9.2 MG/DL (ref 8.5–10.5)
CASTS #/AREA URNS LPF: ABNORMAL /LPF
CASTS 2: ABNORMAL /LPF
CHARACTER UR: CLEAR
CHLORIDE SERPL-SCNC: 90 MEQ/L (ref 98–111)
CO2 SERPL-SCNC: 24 MEQ/L (ref 23–33)
COLOR, UA: YELLOW
CREAT SERPL-MCNC: 0.8 MG/DL (ref 0.4–1.2)
CRYSTALS URNS MICRO: ABNORMAL
DEPRECATED RDW RBC AUTO: 43.1 FL (ref 35–45)
EKG ATRIAL RATE: 77 BPM
EKG P AXIS: 69 DEGREES
EKG P-R INTERVAL: 190 MS
EKG Q-T INTERVAL: 524 MS
EKG QRS DURATION: 106 MS
EKG QTC CALCULATION (BAZETT): 592 MS
EKG R AXIS: 33 DEGREES
EKG T AXIS: 67 DEGREES
EKG VENTRICULAR RATE: 77 BPM
EOSINOPHIL NFR BLD AUTO: 1.9 %
EOSINOPHILS ABSOLUTE: 0.1 THOU/MM3 (ref 0–0.4)
EPITHELIAL CELLS, UA: ABNORMAL /HPF
ERYTHROCYTE [DISTWIDTH] IN BLOOD BY AUTOMATED COUNT: 13.6 % (ref 11.5–14.5)
GFR SERPL CREATININE-BSD FRML MDRD: > 90 ML/MIN/1.73M2
GLUCOSE SERPL-MCNC: 116 MG/DL (ref 70–108)
GLUCOSE UR QL STRIP.AUTO: NEGATIVE MG/DL
HCT VFR BLD AUTO: 45.5 % (ref 42–52)
HGB BLD-MCNC: 15.9 GM/DL (ref 14–18)
HGB UR QL STRIP.AUTO: NEGATIVE
IMM GRANULOCYTES # BLD AUTO: 0.04 THOU/MM3 (ref 0–0.07)
IMM GRANULOCYTES NFR BLD AUTO: 0.5 %
KETONES UR QL STRIP.AUTO: ABNORMAL
LYMPHOCYTES ABSOLUTE: 2.3 THOU/MM3 (ref 1–4.8)
LYMPHOCYTES NFR BLD AUTO: 30.1 %
MAGNESIUM SERPL-MCNC: 2 MG/DL (ref 1.6–2.4)
MCH RBC QN AUTO: 30.3 PG (ref 26–33)
MCHC RBC AUTO-ENTMCNC: 34.9 GM/DL (ref 32.2–35.5)
MCV RBC AUTO: 86.8 FL (ref 80–94)
MISCELLANEOUS 2: ABNORMAL
MONOCYTES ABSOLUTE: 0.5 THOU/MM3 (ref 0.4–1.3)
MONOCYTES NFR BLD AUTO: 6.7 %
NEUTROPHILS ABSOLUTE: 4.5 THOU/MM3 (ref 1.8–7.7)
NEUTROPHILS NFR BLD AUTO: 60.3 %
NITRITE UR QL STRIP: NEGATIVE
NRBC BLD AUTO-RTO: 0 /100 WBC
OSMOLALITY SERPL CALC.SUM OF ELEC: 259.7 MOSMOL/KG (ref 275–300)
PH UR STRIP.AUTO: 6.5 [PH] (ref 5–9)
PLATELET # BLD AUTO: 279 THOU/MM3 (ref 130–400)
PMV BLD AUTO: 9.8 FL (ref 9.4–12.4)
POTASSIUM SERPL-SCNC: 3.1 MEQ/L (ref 3.5–5.2)
PROT SERPL-MCNC: 7.4 G/DL (ref 6.1–8)
PROT UR STRIP.AUTO-MCNC: ABNORMAL MG/DL
RBC # BLD AUTO: 5.24 MILL/MM3 (ref 4.7–6.1)
RBC URINE: ABNORMAL /HPF
RENAL EPI CELLS #/AREA URNS HPF: ABNORMAL /[HPF]
SODIUM SERPL-SCNC: 129 MEQ/L (ref 135–145)
SP GR UR REFRACT.AUTO: 1.01 (ref 1–1.03)
TROPONIN, HIGH SENSITIVITY: < 6 NG/L (ref 0–12)
TSH SERPL DL<=0.005 MIU/L-ACNC: 2.68 UIU/ML (ref 0.4–4.2)
UROBILINOGEN, URINE: 1 EU/DL (ref 0–1)
WBC # BLD AUTO: 7.5 THOU/MM3 (ref 4.8–10.8)
WBC #/AREA URNS HPF: ABNORMAL /HPF
WBC #/AREA URNS HPF: NEGATIVE /[HPF]
YEAST LIKE FUNGI URNS QL MICRO: ABNORMAL

## 2024-11-12 PROCEDURE — 36415 COLL VENOUS BLD VENIPUNCTURE: CPT

## 2024-11-12 PROCEDURE — 81001 URINALYSIS AUTO W/SCOPE: CPT

## 2024-11-12 PROCEDURE — 85025 COMPLETE CBC W/AUTO DIFF WBC: CPT

## 2024-11-12 PROCEDURE — 6370000000 HC RX 637 (ALT 250 FOR IP)

## 2024-11-12 PROCEDURE — 93010 ELECTROCARDIOGRAM REPORT: CPT | Performed by: INTERNAL MEDICINE

## 2024-11-12 PROCEDURE — 80053 COMPREHEN METABOLIC PANEL: CPT

## 2024-11-12 PROCEDURE — 83735 ASSAY OF MAGNESIUM: CPT

## 2024-11-12 PROCEDURE — 99284 EMERGENCY DEPT VISIT MOD MDM: CPT

## 2024-11-12 PROCEDURE — 84484 ASSAY OF TROPONIN QUANT: CPT

## 2024-11-12 PROCEDURE — 84443 ASSAY THYROID STIM HORMONE: CPT

## 2024-11-12 PROCEDURE — 93005 ELECTROCARDIOGRAM TRACING: CPT

## 2024-11-12 RX ORDER — POTASSIUM CHLORIDE 1500 MG/1
20 TABLET, EXTENDED RELEASE ORAL DAILY
Qty: 5 TABLET | Refills: 0 | Status: SHIPPED | OUTPATIENT
Start: 2024-11-12 | End: 2024-11-17

## 2024-11-12 RX ORDER — POTASSIUM CHLORIDE 1500 MG/1
40 TABLET, EXTENDED RELEASE ORAL ONCE
Status: COMPLETED | OUTPATIENT
Start: 2024-11-12 | End: 2024-11-12

## 2024-11-12 RX ADMIN — POTASSIUM CHLORIDE 40 MEQ: 1500 TABLET, EXTENDED RELEASE ORAL at 15:00

## 2024-11-12 ASSESSMENT — ENCOUNTER SYMPTOMS
STRIDOR: 0
VOMITING: 0
CHEST TIGHTNESS: 0
CONSTIPATION: 0
NAUSEA: 0
BLOOD IN STOOL: 0
EYES NEGATIVE: 1
DIARRHEA: 0
WHEEZING: 0
ABDOMINAL PAIN: 0
SHORTNESS OF BREATH: 1
ALLERGIC/IMMUNOLOGIC NEGATIVE: 1

## 2024-11-12 ASSESSMENT — PAIN - FUNCTIONAL ASSESSMENT
PAIN_FUNCTIONAL_ASSESSMENT: NONE - DENIES PAIN
PAIN_FUNCTIONAL_ASSESSMENT: NONE - DENIES PAIN

## 2024-11-12 NOTE — ED PROVIDER NOTES
sections below for continuation and resolution of this initial assessment if applicable.    Initial plan:   CBC, CMP, magnesium, TSH  Troponin, EKG  UA with culture       Comorbid conditions pertinent to this ED encounter:  Not applicable      Differential Diagnosis includes but is not limited to:  Thyroid disease  Anxiety  Electrolyte imbalances       Decision Rules/Clinical Scores utilized:  Not Applicable       Code Status:  Not addressed during this ED visit    Social determinants of health impacting treatment or disposition:  Considered and not applicable     MIPS documentation:  N/A    Medical Decision Making    Patient presents with lightheadedness, dizziness, tremors.  States that has been ongoing for 2 weeks.  Last episode this morning.  Patient reports he has history of anxiety, these episodes do not feel like his previous anxiety attacks. Patient reports shortness of breath, palpitations at times.  Denies any falls.  Denies fever, chills. Denies headache, blurry vision. Denies any weakness.  Patient states he feels like he is going to pass out, however denies any episodes of syncope.  Labs showed sodium of 129, potassium of 3.1.  Patient states he drinks 170 ounces of water daily.  Also on hydrochlorothiazide, anafranil at home.  Encourage patient to decrease water intake. Add electrolyte drinks.  Patient given 1 dose of Klor-Con 40 mEq in ED.  Will discharge on Klor-Con 20 mEq x 5 days.  Follow-up with PCP.  Will discharge home    ED COURSE   ED Medications administered this visit (None if left blank):   Medications   potassium chloride (KLOR-CON M) extended release tablet 40 mEq (has no administration in time range)              PROCEDURES: (None if blank)  Procedures:     CRITICAL CARE:  None    MEDICATION CHANGES     New Prescriptions    POTASSIUM CHLORIDE (KLOR-CON M) 20 MEQ EXTENDED RELEASE TABLET    Take 1 tablet by mouth daily for 5 days         FINAL DISPOSITION   Shared Decision-Making was

## 2024-11-12 NOTE — DISCHARGE INSTRUCTIONS
Call and make appointment with PCP for follow-up  Take potassium chloride 20 mEq daily for 5 days  Return to ED if symptoms worsen

## 2024-11-12 NOTE — TELEPHONE ENCOUNTER
Called pt and LM on VM notifying him that an appt with CG was scheduled for 11/15/24 at 12 PM.  
[facility] ED Follow up Call    Reason for ED visit: Lightheadedness and electrolyte imbalance    Seen at [facility] ED today and was asked to f/up with PCP this week. CG has no openings and pt declines seeing a different provider. Please advise if OK to add on to schedule. Offered appointment with CG on Monday but prefers to be seen this week.       
Ok for 12pm on Friday. CG  
175.26

## 2024-11-15 ENCOUNTER — OFFICE VISIT (OUTPATIENT)
Dept: FAMILY MEDICINE CLINIC | Age: 64
End: 2024-11-15

## 2024-11-15 VITALS
WEIGHT: 210 LBS | BODY MASS INDEX: 31 KG/M2 | TEMPERATURE: 97.9 F | SYSTOLIC BLOOD PRESSURE: 138 MMHG | DIASTOLIC BLOOD PRESSURE: 86 MMHG | RESPIRATION RATE: 16 BRPM | HEART RATE: 64 BPM

## 2024-11-15 DIAGNOSIS — E87.1 HYPONATREMIA: Primary | ICD-10-CM

## 2024-11-15 DIAGNOSIS — Z23 NEED FOR INFLUENZA VACCINATION: ICD-10-CM

## 2024-11-15 DIAGNOSIS — E87.6 HYPOKALEMIA: ICD-10-CM

## 2024-11-15 RX ORDER — POTASSIUM CHLORIDE 1500 MG/1
20 TABLET, EXTENDED RELEASE ORAL DAILY
Qty: 5 TABLET | Refills: 0 | OUTPATIENT
Start: 2024-11-15 | End: 2024-11-20

## 2024-11-15 NOTE — PROGRESS NOTES
Vaccine Information Sheet, \"Influenza - Inactivated\"  given to Jamal Greene, or parent/legal guardian of  Jamal Greene and verbalized understanding.    Patient responses:    Have you ever had a reaction to a flu vaccine? No  Do you have an allergy to eggs, Latex -induced anaphylaxis, neomycin or polymixin?  No  Do you have an allergy to Thimerosal, contact lens solution, or Merthiolate? No  Have you ever had Gu[place] Syndrome?  No  Do you have any current illness?  No  Do you have a temperature above 100.4 degrees? No  Are you pregnant? No  If pregnant, permission obtained from physician? NA  Do you have an active neurological disorder? No      Flu vaccine given per order. Please see immunization tab.  After obtaining consent, and per orders of Dr. Cardenas, injection of Flucelvax 0.5 ml IM left deltoid given by JULIO CESAR WYMAN RN. Patient tolerated and left after injection.

## 2024-11-15 NOTE — PROGRESS NOTES
11/15/2024    Jamal Greene (:  1960) is a 63 y.o. male, Established patient, here for evaluation of the following chief complaint(s):  ED Follow-up (Seen at [facility] ED on 24 for lightheadedness. Found to have electrolyte imbalance. /Instructed to cut back on water intake. )      ASSESSMENT/PLAN:    1. Hyponatremia  -     Basic Metabolic Panel; Future  2. Hypokalemia  -     Basic Metabolic Panel; Future  3. Need for influenza vaccination  -     Influenza, FLUCELVAX Trivalent, (age 6 mo+) IM, Preservative Free, 0.5mL    Patient will limit his fluid intake to 64 ounces daily and we will recheck a BMP to follow-up on his hyponatremia and hypokalemia next week.  These 2 conditions are acute and uncontrolled.    Flu shot today    Will notify him of his test results when available    SUBJECTIVE/OBJECTIVE:    HPI    Patient here today for ER follow-up after being seen with dizziness, lightheadedness, and a near syncopal episode.  Patient was found to be hyponatremic and hypokalemic.  He reports that he had been drinking up to 10 bottles of water per day.  He was given potassium in the emergency department and sent home with oral potassium as well.  He has been on a fluid restriction of 60 to 64 ounces of fluid daily.  He states that he feels significantly better and has had no dizziness or lightheadedness whatsoever since he cut back on his fluid intake.    Review of Systems   Constitutional:  Negative for chills, fatigue, fever and unexpected weight change.   HENT: Negative.     Eyes: Negative.  Negative for visual disturbance.   Respiratory:  Negative for chest tightness and shortness of breath.    Cardiovascular:  Negative for chest pain, palpitations and leg swelling.   Gastrointestinal:  Negative for abdominal pain and blood in stool.   Genitourinary:  Negative for dysuria.   Musculoskeletal:  Negative for joint swelling.   Skin:  Negative for rash.   Neurological:  Negative for dizziness,

## 2024-11-20 ENCOUNTER — NURSE ONLY (OUTPATIENT)
Dept: UROLOGY | Age: 64
End: 2024-11-20
Payer: COMMERCIAL

## 2024-11-20 DIAGNOSIS — E29.1 HYPOGONADISM IN MALE: Primary | ICD-10-CM

## 2024-11-20 DIAGNOSIS — R79.89 LOW TESTOSTERONE: ICD-10-CM

## 2024-11-20 PROCEDURE — 96372 THER/PROPH/DIAG INJ SC/IM: CPT | Performed by: NURSE PRACTITIONER

## 2024-11-20 RX ORDER — TESTOSTERONE CYPIONATE 200 MG/ML
200 INJECTION, SOLUTION INTRAMUSCULAR ONCE
Status: COMPLETED | OUTPATIENT
Start: 2024-11-20 | End: 2024-11-20

## 2024-11-20 RX ADMIN — TESTOSTERONE CYPIONATE 200 MG: 200 INJECTION, SOLUTION INTRAMUSCULAR at 08:37

## 2024-11-20 NOTE — PROGRESS NOTES
DIAGNOSIS: hypogonadism in male, low testosterone      200mg/ml Testosterone Cypionate administered with 22 gaugeG 1 inch needle.      Patient has given me verbal consent to perform Testosterone Injection.  Yes     Following Jessica ALVARADO plan of care  Testosterone 200mg/ml GIVEN I.M. Left UOQ hip- administered 1 ml of testosterone to patient  Lot Number: [id number]  NDC #: 3940-7644-18  Exp: 6/30/2027     Patient supplied his own medications  Yes     Patient to return in 2 weeks for next injection.      Follow-up with Vipul Duron PA-C on 1/28/2025 with labs.      Jessica ALVARADO-[facility]  Urology

## 2024-11-22 ENCOUNTER — LAB (OUTPATIENT)
Dept: LAB | Age: 64
End: 2024-11-22

## 2024-11-22 DIAGNOSIS — E87.1 HYPONATREMIA: ICD-10-CM

## 2024-11-22 DIAGNOSIS — E87.6 HYPOKALEMIA: ICD-10-CM

## 2024-11-22 LAB
ANION GAP SERPL CALC-SCNC: 9 MEQ/L (ref 8–16)
BUN SERPL-MCNC: 18 MG/DL (ref 7–22)
CALCIUM SERPL-MCNC: 9.8 MG/DL (ref 8.5–10.5)
CHLORIDE SERPL-SCNC: 91 MEQ/L (ref 98–111)
CO2 SERPL-SCNC: 27 MEQ/L (ref 23–33)
CREAT SERPL-MCNC: 1 MG/DL (ref 0.4–1.2)
GFR SERPL CREATININE-BSD FRML MDRD: 84 ML/MIN/1.73M2
GLUCOSE SERPL-MCNC: 97 MG/DL (ref 70–108)
POTASSIUM SERPL-SCNC: 5 MEQ/L (ref 3.5–5.2)
SODIUM SERPL-SCNC: 127 MEQ/L (ref 135–145)

## 2024-11-25 ENCOUNTER — TELEPHONE (OUTPATIENT)
Dept: FAMILY MEDICINE CLINIC | Age: 64
End: 2024-11-25

## 2024-11-25 DIAGNOSIS — E87.1 HYPONATREMIA: Primary | ICD-10-CM

## 2024-11-25 NOTE — TELEPHONE ENCOUNTER
Restrict fluid intake to 48-60oz daily.  Recheck serum sodium in 2 weeks.  If not improved by then, refer to nephrology. CG

## 2024-11-25 NOTE — TELEPHONE ENCOUNTER
----- Message from Dr. Radha Cardenas MD sent at 11/24/2024  3:45 PM EST -----  Patient remains hyponatremic with sodium 127.  Check with him and see how much fluid he's drinking.  If he's restricting his fluid and his sodium is still low, refer to nephrology. CG

## 2024-11-25 NOTE — TELEPHONE ENCOUNTER
Called and notified patient of results. Pt states he is not restricting his fluid in take as much as what he should be. Pt wants to know if he restricts more then should we retest his sodium or he is ok with referral to Nephrology.    Referral pending if we take that route.

## 2024-12-03 NOTE — PROGRESS NOTES
DIAGNOSIS: hypogonadism in male, low testosterone      200mg/ml Testosterone Cypionate administered with 22 gaugeG 1 inch needle.      Patient has given me verbal consent to perform Testosterone Injection.  Yes     Following Jessica ALVARADO plan of care  Testosterone 200mg/ml GIVEN I.M. Right UOQ hip- administered 1 ml of testosterone to patient  Lot Number: [id number]  NDC #: 4481-7977-41  Exp: 6/30/2027     Patient supplied his own medications  Yes     Patient to return in 2 weeks for next injection.       Follow-up with Vipul Duron PA-C on 1/28/2025 with labs.      Jessica ALVARADO-[facility]  Urology

## 2024-12-04 ENCOUNTER — NURSE ONLY (OUTPATIENT)
Dept: UROLOGY | Age: 64
End: 2024-12-04
Payer: COMMERCIAL

## 2024-12-04 DIAGNOSIS — E29.1 HYPOGONADISM IN MALE: Primary | ICD-10-CM

## 2024-12-04 DIAGNOSIS — R79.89 LOW TESTOSTERONE: ICD-10-CM

## 2024-12-04 PROCEDURE — 96372 THER/PROPH/DIAG INJ SC/IM: CPT | Performed by: NURSE PRACTITIONER

## 2024-12-04 RX ORDER — TESTOSTERONE CYPIONATE 200 MG/ML
200 INJECTION, SOLUTION INTRAMUSCULAR ONCE
Status: COMPLETED | OUTPATIENT
Start: 2024-12-04 | End: 2024-12-04

## 2024-12-04 RX ADMIN — TESTOSTERONE CYPIONATE 200 MG: 200 INJECTION, SOLUTION INTRAMUSCULAR at 08:45

## 2024-12-04 NOTE — PROGRESS NOTES
DIAGNOSIS: hypogonadism in male, low testosterone     200mg/ml Testosterone Cypionate administered with 22 gaugeG 1 inch needle.      Patient has given me verbal consent to perform Testosterone Injection.  Yes     Following Jessica ALVARADO plan of care  Testosterone 200mg/ml GIVEN I.M. right UOQ hip- administered 1 ml of testosterone to patient  Lot Number: [id number]  NDC #: 0940-9509-51  Exp: 06/30/27     Patient supplied his own medications  Yes     Patient to return in 2 weeks for next injection.

## 2024-12-06 ENCOUNTER — TELEPHONE (OUTPATIENT)
Dept: FAMILY MEDICINE CLINIC | Age: 64
End: 2024-12-06

## 2024-12-06 ENCOUNTER — LAB (OUTPATIENT)
Dept: LAB | Age: 64
End: 2024-12-06

## 2024-12-06 DIAGNOSIS — E87.1 HYPONATREMIA: ICD-10-CM

## 2024-12-06 DIAGNOSIS — E87.1 HYPONATREMIA: Primary | ICD-10-CM

## 2024-12-06 LAB — SODIUM SERPL-SCNC: 130 MEQ/L (ref 135–145)

## 2024-12-06 NOTE — TELEPHONE ENCOUNTER
----- Message from Dr. Radha Cardenas MD sent at 12/6/2024 10:45 AM EST -----  Serum sodium is improving but still slightly low.  Continue fluid restriction of 60oz daily.  Recheck sodium level in 3-4 weeks.  Dx:  hyponatremia. CG

## 2024-12-06 NOTE — TELEPHONE ENCOUNTER
Spoke to pt and notified him of the lab results and CG recommendations and he verbalized understanding. Recheck lab order placed and pt will go to [facility] in 3-4 weeks to have it drawn.

## 2024-12-18 ENCOUNTER — NURSE ONLY (OUTPATIENT)
Dept: UROLOGY | Age: 64
End: 2024-12-18
Payer: COMMERCIAL

## 2024-12-18 DIAGNOSIS — E29.1 HYPOGONADISM IN MALE: Primary | ICD-10-CM

## 2024-12-18 PROCEDURE — 96372 THER/PROPH/DIAG INJ SC/IM: CPT

## 2024-12-18 RX ORDER — TESTOSTERONE CYPIONATE 200 MG/ML
200 INJECTION, SOLUTION INTRAMUSCULAR ONCE
Status: COMPLETED | OUTPATIENT
Start: 2024-12-18 | End: 2024-12-18

## 2024-12-18 RX ADMIN — TESTOSTERONE CYPIONATE 200 MG: 200 INJECTION, SOLUTION INTRAMUSCULAR at 08:15

## 2024-12-18 NOTE — PROGRESS NOTES
DIAGNOSIS: hypogonadism in male, low testosterone     200mg/ml Testosterone Cypionate administered with 22 gaugeG 1 inch needle.      Patient has given me verbal consent to perform Testosterone Injection.  Yes     Following Vipul WHEELER plan of care  Testosterone 200mg/ml GIVEN I.M. left UOQ hip- administered 1 ml of testosterone to patient  Lot Number: [id number]  NDC #: 4665-1004-63  Exp: 06/30/27     Patient supplied his own medications  Yes     Patient to return in 2 weeks for next injection.

## 2025-01-02 ENCOUNTER — NURSE ONLY (OUTPATIENT)
Dept: UROLOGY | Age: 65
End: 2025-01-02
Payer: COMMERCIAL

## 2025-01-02 DIAGNOSIS — R79.89 LOW TESTOSTERONE: ICD-10-CM

## 2025-01-02 DIAGNOSIS — E29.1 HYPOGONADISM IN MALE: Primary | ICD-10-CM

## 2025-01-02 DIAGNOSIS — E29.1 HYPOGONADISM IN MALE: ICD-10-CM

## 2025-01-02 PROCEDURE — 96372 THER/PROPH/DIAG INJ SC/IM: CPT | Performed by: NURSE PRACTITIONER

## 2025-01-02 RX ORDER — TESTOSTERONE CYPIONATE 200 MG/ML
200 INJECTION, SOLUTION INTRAMUSCULAR
Qty: 4 ML | Refills: 0 | Status: SHIPPED | OUTPATIENT
Start: 2025-01-02 | End: 2025-02-18

## 2025-01-02 RX ORDER — TESTOSTERONE CYPIONATE 200 MG/ML
200 INJECTION, SOLUTION INTRAMUSCULAR ONCE
Status: COMPLETED | OUTPATIENT
Start: 2025-01-02 | End: 2025-01-02

## 2025-01-02 RX ADMIN — TESTOSTERONE CYPIONATE 200 MG: 200 INJECTION, SOLUTION INTRAMUSCULAR at 08:02

## 2025-01-02 NOTE — PROGRESS NOTES
DIAGNOSIS: hypogonadism in male, low testosterone        200mg/ml Testosterone Cypionate administered with 22 gaugeG 1 inch needle.      Patient has given me verbal consent to perform Testosterone Injection.  Yes     Following Radha ALVARADO plan of care  Testosterone 200mg/ml GIVEN I.M. right UOQ hip- administered 1 ml of testosterone to patient  Lot Number: [id number]  NDC #: 4593-0375-08  Exp: 06/27     Patient supplied his own medications  Yes     Patient to return in 2 weeks for next injection.

## 2025-01-02 NOTE — TELEPHONE ENCOUNTER
Jamal D Watson called requesting a refill on the following medications:  Requested Prescriptions     Pending Prescriptions Disp Refills    testosterone cypionate (DEPOTESTOTERONE CYPIONATE) 200 MG/ML injection 4 mL 3     Sig: Inject 1 mL into the muscle every 14 days for 16 doses. Max Daily Amount: 200 mg     Pharmacy verified: [address]  .pv      Date of last visit: 1/02/2025  Date of next visit (if applicable): Visit date not found

## 2025-01-02 NOTE — PROGRESS NOTES
Per emre WHEELER plan of care.  I have agreed with plan.    Keep upcoming appointment with Emre later this month and complete labs as ordered.

## 2025-01-03 DIAGNOSIS — I10 PRIMARY HYPERTENSION: Primary | ICD-10-CM

## 2025-01-03 RX ORDER — AMLODIPINE BESYLATE 5 MG/1
5 TABLET ORAL DAILY
Qty: 30 TABLET | Refills: 5 | Status: SHIPPED | OUTPATIENT
Start: 2025-01-03

## 2025-01-03 NOTE — TELEPHONE ENCOUNTER
Jamal D Watson called requesting a refill on the following medications:  Requested Prescriptions     Pending Prescriptions Disp Refills    amLODIPine (NORVASC) 5 MG tablet [Pharmacy Med Name: AMLODIPINE BESYLATE 5MG TABLETS] 30 tablet 1     Sig: TAKE 1 TABLET BY MOUTH DAILY       Date of last visit: 11/15/2024  Date of next visit (if applicable):Visit date not found  Date of last refill: 11/7/24 x 2 mo.  Pharmacy Name: [facility],  Val Carr MA

## 2025-01-06 ENCOUNTER — LAB (OUTPATIENT)
Dept: LAB | Age: 65
End: 2025-01-06

## 2025-01-06 DIAGNOSIS — E87.1 HYPONATREMIA: ICD-10-CM

## 2025-01-06 LAB — SODIUM SERPL-SCNC: 133 MEQ/L (ref 135–145)

## 2025-01-17 ENCOUNTER — NURSE ONLY (OUTPATIENT)
Dept: UROLOGY | Age: 65
End: 2025-01-17
Payer: COMMERCIAL

## 2025-01-17 DIAGNOSIS — E29.1 HYPOGONADISM IN MALE: Primary | ICD-10-CM

## 2025-01-17 PROCEDURE — 96372 THER/PROPH/DIAG INJ SC/IM: CPT

## 2025-01-17 RX ORDER — TESTOSTERONE CYPIONATE 200 MG/ML
200 INJECTION, SOLUTION INTRAMUSCULAR ONCE
Status: COMPLETED | OUTPATIENT
Start: 2025-01-17 | End: 2025-01-17

## 2025-01-17 RX ADMIN — TESTOSTERONE CYPIONATE 200 MG: 200 INJECTION, SOLUTION INTRAMUSCULAR at 08:14

## 2025-01-17 NOTE — PROGRESS NOTES
DIAGNOSIS: hypogonadism in male, low testosterone     200mg/ml Testosterone Cypionate administered with 22 gaugeG 1 inch needle.      Patient has given me verbal consent to perform Testosterone Injection.  Yes     Following Vipul WHEELER plan of care  Testosterone 200mg/ml GIVEN I.M. left UOQ hip- administered 1 ml of testosterone to patient  Lot Number: [id number]  NDC #: 6114-6743-26  Exp: 06/2027     Patient supplied his own medications  Yes     Patient to return in 2 weeks for next injection.          Nickie Matute PA-C  Urology

## 2025-01-31 ENCOUNTER — LAB (OUTPATIENT)
Dept: LAB | Age: 65
End: 2025-01-31

## 2025-01-31 DIAGNOSIS — E29.1 HYPOGONADISM IN MALE: ICD-10-CM

## 2025-01-31 DIAGNOSIS — R79.89 LOW TESTOSTERONE: ICD-10-CM

## 2025-01-31 LAB
ALBUMIN SERPL BCG-MCNC: 4.4 G/DL (ref 3.5–5.1)
ALP SERPL-CCNC: 52 U/L (ref 38–126)
ALT SERPL W/O P-5'-P-CCNC: 22 U/L (ref 11–66)
AST SERPL-CCNC: 19 U/L (ref 5–40)
BILIRUB CONJ SERPL-MCNC: < 0.1 MG/DL (ref 0.1–13.8)
BILIRUB SERPL-MCNC: 0.3 MG/DL (ref 0.3–1.2)
HCT VFR BLD AUTO: 45 % (ref 42–52)
HGB BLD-MCNC: 15.2 GM/DL (ref 14–18)
PROT SERPL-MCNC: 7.1 G/DL (ref 6.1–8)
TESTOST SERPL-MCNC: 214 NG/DL (ref 193–740)

## 2025-02-03 ENCOUNTER — OFFICE VISIT (OUTPATIENT)
Dept: UROLOGY | Age: 65
End: 2025-02-03
Payer: COMMERCIAL

## 2025-02-03 VITALS — HEIGHT: 69 IN | WEIGHT: 210 LBS | RESPIRATION RATE: 20 BRPM | BODY MASS INDEX: 31.1 KG/M2

## 2025-02-03 DIAGNOSIS — R79.89 LOW TESTOSTERONE: ICD-10-CM

## 2025-02-03 DIAGNOSIS — E29.1 HYPOGONADISM IN MALE: Primary | ICD-10-CM

## 2025-02-03 DIAGNOSIS — N52.9 ERECTILE DYSFUNCTION, UNSPECIFIED ERECTILE DYSFUNCTION TYPE: ICD-10-CM

## 2025-02-03 PROCEDURE — G8427 DOCREV CUR MEDS BY ELIG CLIN: HCPCS

## 2025-02-03 PROCEDURE — 1036F TOBACCO NON-USER: CPT

## 2025-02-03 PROCEDURE — 99214 OFFICE O/P EST MOD 30 MIN: CPT

## 2025-02-03 PROCEDURE — 1123F ACP DISCUSS/DSCN MKR DOCD: CPT

## 2025-02-03 PROCEDURE — 3017F COLORECTAL CA SCREEN DOC REV: CPT

## 2025-02-03 PROCEDURE — G8417 CALC BMI ABV UP PARAM F/U: HCPCS

## 2025-02-03 PROCEDURE — 96372 THER/PROPH/DIAG INJ SC/IM: CPT

## 2025-02-03 RX ORDER — TESTOSTERONE CYPIONATE 200 MG/ML
200 INJECTION, SOLUTION INTRAMUSCULAR ONCE
Status: COMPLETED | OUTPATIENT
Start: 2025-02-03 | End: 2025-02-03

## 2025-02-03 RX ADMIN — TESTOSTERONE CYPIONATE 200 MG: 200 INJECTION, SOLUTION INTRAMUSCULAR at 10:52

## 2025-02-03 NOTE — PROGRESS NOTES
office every 14 days. +Benefit in libido, energy, and muscle maintenance.  - Continue TRT as prescribed. Refills sent. Rescheduling for next injection in 1 week.  - After consent, Testosterone Cypionate 200 mg/mL, 1 mL was given today into patient's right UOQ hip (dorsogluteal). Tolerated well. NDC: 1031-9484-70, Lot #: [id number], Exp: 07/01/27.  - Labs reviewed and are within acceptable limits. Re-ordered to monitor treatment and avoid toxicity; will have done in 6 months.    3.   Erectile Dysfunction  - Not at goal with Sildenafil 100 mg PRN. Bothersome.  - Discussed ICI. Now interested in this and ready to move forward with office visit/teaching.   - Trimix VI 0.2 mL.    *Schedule for Trimix teaching in 2 weeks + TRT lab visit in 6 months.    Vipul Duron PA-C  Urology

## 2025-02-04 DIAGNOSIS — B00.9 HERPES INFECTION: ICD-10-CM

## 2025-02-04 DIAGNOSIS — I10 PRIMARY HYPERTENSION: ICD-10-CM

## 2025-02-04 RX ORDER — VALACYCLOVIR HYDROCHLORIDE 500 MG/1
500 TABLET, FILM COATED ORAL DAILY
Qty: 90 TABLET | Refills: 3 | OUTPATIENT
Start: 2025-02-04

## 2025-02-04 RX ORDER — AMLODIPINE BESYLATE 5 MG/1
5 TABLET ORAL DAILY
Qty: 30 TABLET | Refills: 5 | OUTPATIENT
Start: 2025-02-04

## 2025-02-04 NOTE — TELEPHONE ENCOUNTER
This medication refill is regarding a electronic request. Refill requested by patient.    Requested Prescriptions     Pending Prescriptions Disp Refills    valACYclovir (VALTREX) 500 MG tablet 90 tablet 3     Sig: Take 1 tablet by mouth daily     Date of last visit: 11/15/2024   Date of next visit: Visit date not found  REFUSED DUE TO HAVING REFILLS  Pharmacy Name: [id number] - [place], [place] - [address] - P [phone] - F [phone]     Last Lipid Panel:    Lab Results   Component Value Date/Time    CHOL 212 06/06/2024 10:39 AM    TRIG 313 06/06/2024 10:39 AM    HDL 31 06/06/2024 10:39 AM     Last CMP:   Lab Results   Component Value Date     (L) 01/06/2025    K 5.0 11/22/2024    CL 91 (L) 11/22/2024    CO2 27 11/22/2024    BUN 18 11/22/2024    CREATININE 1.0 11/22/2024    GLUCOSE 97 11/22/2024    CALCIUM 9.8 11/22/2024    BILITOT 0.3 01/31/2025    ALKPHOS 52 01/31/2025    AST 19 01/31/2025    ALT 22 01/31/2025    LABGLOM 84 11/22/2024       Last Thyroid:    Lab Results   Component Value Date    TSH 2.680 11/12/2024    T4FREE 0.78 02/16/2023     Last Hemoglobin A1C:    Lab Results   Component Value Date/Time    LABA1C 6.0 06/06/2024 10:39 AM       Rx verified, ordered and set to EP.

## 2025-02-18 ENCOUNTER — TELEPHONE (OUTPATIENT)
Dept: UROLOGY | Age: 65
End: 2025-02-18

## 2025-02-18 ENCOUNTER — OFFICE VISIT (OUTPATIENT)
Dept: UROLOGY | Age: 65
End: 2025-02-18
Payer: COMMERCIAL

## 2025-02-18 VITALS — HEIGHT: 69 IN | WEIGHT: 214.5 LBS | BODY MASS INDEX: 31.77 KG/M2 | RESPIRATION RATE: 14 BRPM

## 2025-02-18 DIAGNOSIS — E29.1 HYPOGONADISM IN MALE: Primary | ICD-10-CM

## 2025-02-18 DIAGNOSIS — N52.9 ERECTILE DYSFUNCTION, UNSPECIFIED ERECTILE DYSFUNCTION TYPE: ICD-10-CM

## 2025-02-18 DIAGNOSIS — R79.89 LOW TESTOSTERONE: ICD-10-CM

## 2025-02-18 PROCEDURE — 1123F ACP DISCUSS/DSCN MKR DOCD: CPT

## 2025-02-18 PROCEDURE — G8417 CALC BMI ABV UP PARAM F/U: HCPCS

## 2025-02-18 PROCEDURE — 1036F TOBACCO NON-USER: CPT

## 2025-02-18 PROCEDURE — 3017F COLORECTAL CA SCREEN DOC REV: CPT

## 2025-02-18 PROCEDURE — G8427 DOCREV CUR MEDS BY ELIG CLIN: HCPCS

## 2025-02-18 PROCEDURE — 96372 THER/PROPH/DIAG INJ SC/IM: CPT

## 2025-02-18 PROCEDURE — 54235 NJX CORPORA CAVERNOSA RX AGT: CPT

## 2025-02-18 PROCEDURE — 99213 OFFICE O/P EST LOW 20 MIN: CPT

## 2025-02-18 RX ORDER — TESTOSTERONE CYPIONATE 200 MG/ML
200 INJECTION, SOLUTION INTRAMUSCULAR ONCE
Status: COMPLETED | OUTPATIENT
Start: 2025-02-18 | End: 2025-02-18

## 2025-02-18 RX ADMIN — TESTOSTERONE CYPIONATE 200 MG: 200 INJECTION, SOLUTION INTRAMUSCULAR at 09:24

## 2025-02-18 NOTE — TELEPHONE ENCOUNTER
Patient stated the trimix lasted longer than it should.    Should he use a smaller dose?    I can send a reply on my chart.

## 2025-02-18 NOTE — PROGRESS NOTES
[facility] [place] [facility]  [facility]  [address]  [address]  [facility] [address]  Dept: [phone]  Loc: [phone]  Visit Date: 2/18/2025        HPI  Jamal Greene is a 64 y.o. male that presents to the urology clinic for erectile dysfunction and low testosterone follow-up.    Patient following with our office for management of his TRT. Doing well on current regimen of 200 mg (1 mL) IM of Testosterone Cypionate in the office every 14 days. +Benefit in libido, energy, and muscle maintenance.    Also following for management of ED. Not at goal with Sildenafil 100 mg PRN. Progressively bothersome. Here today for Trimix teaching.      Most Recent PSA: 0.37 (01/12/24)      Last Total Testosterone:  Lab Results   Component Value Date    TESTOSTERONE 214 01/31/2025       Last BUN and Creatinine:  Lab Results   Component Value Date    BUN 18 11/22/2024     Lab Results   Component Value Date    CREATININE 1.0 11/22/2024       PAST MEDICAL, FAMILY AND SOCIAL HISTORY UPDATE:  Past Medical History:   Diagnosis Date    Depression     Genital herpes     GERD (gastroesophageal reflux disease)     Hematospermia     Hyperlipidemia     Hypertension     OCD (obsessive compulsive disorder)     UTI (urinary tract infection)      Past Surgical History:   Procedure Laterality Date    ENDOSCOPY, COLON, DIAGNOSTIC      SHOULDER SURGERY Left 11/18/2019    Dr. Hector    SHOULDER SURGERY Right 12/2019     Family History   Problem Relation Age of Onset    Cancer Father         Lung    Heart Attack Paternal Grandfather      No outpatient medications have been marked as taking for the 2/18/25 encounter (Office Visit) with Vipul Duron PA-C.       Patient has no known allergies.  Social History     Tobacco Use   Smoking Status Never   Smokeless Tobacco Never       Social History     Substance and Sexual Activity   Alcohol Use No       REVIEW OF SYSTEMS:  Constitutional: negative  Eyes:

## 2025-02-18 NOTE — TELEPHONE ENCOUNTER
Is the erection down now? If not, patient should take a Sudafed or Benadryl.. and if this does not work he will need to present to the ED.

## 2025-03-11 ENCOUNTER — NURSE ONLY (OUTPATIENT)
Dept: UROLOGY | Age: 65
End: 2025-03-11
Payer: COMMERCIAL

## 2025-03-11 DIAGNOSIS — E29.1 HYPOGONADISM IN MALE: Primary | ICD-10-CM

## 2025-03-11 PROCEDURE — 96372 THER/PROPH/DIAG INJ SC/IM: CPT

## 2025-03-11 RX ORDER — TESTOSTERONE CYPIONATE 200 MG/ML
200 INJECTION, SOLUTION INTRAMUSCULAR ONCE
Status: DISCONTINUED | OUTPATIENT
Start: 2025-03-11 | End: 2025-03-11

## 2025-03-11 RX ORDER — TESTOSTERONE CYPIONATE 200 MG/ML
200 INJECTION, SOLUTION INTRAMUSCULAR ONCE
Status: COMPLETED | OUTPATIENT
Start: 2025-03-11 | End: 2025-03-11

## 2025-03-11 RX ADMIN — TESTOSTERONE CYPIONATE 200 MG: 200 INJECTION, SOLUTION INTRAMUSCULAR at 08:34

## 2025-03-11 NOTE — PROGRESS NOTES
DIAGNOSIS: hypogonadism in male, low testosterone      200mg/ml Testosterone Cypionate administered with 22 gaugeG 1 inch needle.      Patient has given me verbal consent to perform Testosterone Injection.  Yes     Following Vipul WHEELER plan of care  Testosterone 200mg/ml GIVEN I.M. right UOQ hip- administered 1 ml of testosterone to patient  Lot Number: [id number]  NDC: 1681-3275-0   Exp: 07/2027     Patient supplied his own medications  Yes     Patient to return in 2 weeks for next injection.        Nickie Matute PA-C  Urology

## 2025-03-18 DIAGNOSIS — R79.89 LOW TESTOSTERONE: ICD-10-CM

## 2025-03-18 DIAGNOSIS — E29.1 HYPOGONADISM IN MALE: ICD-10-CM

## 2025-03-18 NOTE — TELEPHONE ENCOUNTER
Jamal D Watson called requesting a refill on the following medications:  Requested Prescriptions     Pending Prescriptions Disp Refills    testosterone cypionate (DEPOTESTOTERONE CYPIONATE) 200 MG/ML injection 4 mL 0     Sig: Inject 1 mL into the muscle every 14 days for 4 doses. Max Daily Amount: 200 mg     Pharmacy verified:  .mauro  [facility] #[id number] - [place], [place] - [address] - P [phone] - F [phone]     Date of last visit: 03/11/2025  Date of next visit (if applicable): 3/25/2025

## 2025-03-19 RX ORDER — TESTOSTERONE CYPIONATE 200 MG/ML
200 INJECTION, SOLUTION INTRAMUSCULAR
Qty: 4 ML | Refills: 3 | Status: SHIPPED | OUTPATIENT
Start: 2025-03-19 | End: 2025-10-16

## 2025-03-25 ENCOUNTER — CLINICAL SUPPORT (OUTPATIENT)
Dept: UROLOGY | Age: 65
End: 2025-03-25
Payer: COMMERCIAL

## 2025-03-25 DIAGNOSIS — E29.1 HYPOGONADISM IN MALE: Primary | ICD-10-CM

## 2025-03-25 PROCEDURE — 96372 THER/PROPH/DIAG INJ SC/IM: CPT

## 2025-03-25 RX ORDER — TESTOSTERONE CYPIONATE 200 MG/ML
200 INJECTION, SOLUTION INTRAMUSCULAR ONCE
Status: COMPLETED | OUTPATIENT
Start: 2025-03-25 | End: 2025-03-25

## 2025-03-25 RX ADMIN — TESTOSTERONE CYPIONATE 200 MG: 200 INJECTION, SOLUTION INTRAMUSCULAR at 08:28

## 2025-03-25 NOTE — PROGRESS NOTES
I have personally verified, reviewed, and approved these actions.    Testosterone given per Vipul Duron PA-C plan of care. Last seen on 2/18/2025. F/u appt scheduled for 7/31/2025      Please have the patient follow-up as scheduled or sooner if needed

## 2025-03-25 NOTE — PROGRESS NOTES
DIAGNOSIS: hypogonadism in male, low testosterone     200mg/ml Testosterone Cypionate administered with 22 gaugeG 1 inch needle.      Patient has given me verbal consent to perform Testosterone Injection.  Yes     Following Nickie WHEELER plan of care  Testosterone 200mg/ml GIVEN I.M. left UOQ hip- administered 1 ml of testosterone to patient  Lot Number: [id number]  NDC #: 2314-5825-72  Exp: 10/27     Patient supplied his own medications  Yes     Patient to return in 2 weeks for next injection.

## 2025-04-06 DIAGNOSIS — R79.89 LOW TESTOSTERONE: ICD-10-CM

## 2025-04-06 DIAGNOSIS — E29.1 HYPOGONADISM IN MALE: ICD-10-CM

## 2025-04-07 RX ORDER — SILDENAFIL 100 MG/1
100 TABLET, FILM COATED ORAL DAILY PRN
Qty: 30 TABLET | Refills: 3 | Status: SHIPPED | OUTPATIENT
Start: 2025-04-07

## 2025-04-07 RX ORDER — TESTOSTERONE CYPIONATE 200 MG/ML
200 INJECTION, SOLUTION INTRAMUSCULAR
Qty: 12 ML | Refills: 0 | Status: SHIPPED | OUTPATIENT
Start: 2025-04-07 | End: 2025-09-22

## 2025-04-07 NOTE — TELEPHONE ENCOUNTER
Jamal D Watson called requesting a refill on the following medications:  Requested Prescriptions     Pending Prescriptions Disp Refills    sildenafil (VIAGRA) 100 MG tablet [Pharmacy Med Name: SILDENAFIL 100MG TABLETS] 30 tablet 3     Sig: TAKE 1 TABLET BY MOUTH DAILY AS NEEDED FOR ERECTILE DYSFUNCTION     Pharmacy verified:  .pv      Date of last visit: 02/18/2025  Date of next visit (if applicable): 4/8/2025

## 2025-04-07 NOTE — TELEPHONE ENCOUNTER
Jamal D Watson called requesting a refill on the following medications:  Requested Prescriptions     Pending Prescriptions Disp Refills    testosterone cypionate (DEPOTESTOTERONE CYPIONATE) 200 MG/ML injection [Pharmacy Med Name: TESTOSTERONE CYP 200MG/ML SDV 1ML] 12 mL      Sig: INJECT 1 ML IN THE MUSCLE EVERY 14 DAYS  DAYS. MAXIMUM DAILY DOSE  MG     Pharmacy verified:  .pv      Date of last visit: 02/18/2025  Date of next visit (if applicable): 4/6/2025

## 2025-04-08 ENCOUNTER — CLINICAL SUPPORT (OUTPATIENT)
Dept: UROLOGY | Age: 65
End: 2025-04-08
Payer: COMMERCIAL

## 2025-04-08 DIAGNOSIS — R79.89 LOW TESTOSTERONE: ICD-10-CM

## 2025-04-08 DIAGNOSIS — E29.1 HYPOGONADISM IN MALE: Primary | ICD-10-CM

## 2025-04-08 PROCEDURE — 96372 THER/PROPH/DIAG INJ SC/IM: CPT

## 2025-04-08 RX ORDER — TESTOSTERONE CYPIONATE 200 MG/ML
200 INJECTION, SOLUTION INTRAMUSCULAR ONCE
Status: COMPLETED | OUTPATIENT
Start: 2025-04-08 | End: 2025-04-08

## 2025-04-08 RX ADMIN — TESTOSTERONE CYPIONATE 200 MG: 200 INJECTION, SOLUTION INTRAMUSCULAR at 08:28

## 2025-04-08 NOTE — PROGRESS NOTES
DIAGNOSIS: low testosterone, male hypogonadism     200mg/ml Testosterone Cypionate administered with 22 gaugeG 1 inch needle.      Patient has given me verbal consent to perform Testosterone Injection.  Yes     Following Vipul WHEELER plan of care  Testosterone 200mg/ml GIVEN I.M. right UOQ hip- administered 1 ml of testosterone to patient  Lot Number: [id number]  NDC #: 6272-6143-41  Exp: 10/2027     Patient supplied his own medications  Yes     Patient to return in 2 weeks for next injection.          Nickie Matute PA-C  Urology

## 2025-04-22 ENCOUNTER — CLINICAL SUPPORT (OUTPATIENT)
Dept: UROLOGY | Age: 65
End: 2025-04-22
Payer: COMMERCIAL

## 2025-04-22 DIAGNOSIS — E29.1 HYPOGONADISM IN MALE: Primary | ICD-10-CM

## 2025-04-22 PROCEDURE — 96372 THER/PROPH/DIAG INJ SC/IM: CPT

## 2025-04-22 RX ORDER — TESTOSTERONE CYPIONATE 200 MG/ML
200 INJECTION, SOLUTION INTRAMUSCULAR ONCE
Status: COMPLETED | OUTPATIENT
Start: 2025-04-22 | End: 2025-04-22

## 2025-04-22 RX ADMIN — TESTOSTERONE CYPIONATE 200 MG: 200 INJECTION, SOLUTION INTRAMUSCULAR at 08:35

## 2025-04-22 NOTE — PROGRESS NOTES
I have personally verified, reviewed, and approved these actions.      Pt saw Vipul Duron in 2/2025 after having labs completed 1/2025.   Has f/u in 7/2025 with Vipul Duorn PA-C  He otherwise continues to receive testosterone injections every 2 weeks

## 2025-04-22 NOTE — PROGRESS NOTES
DIAGNOSIS:  low testosterone, male hypogonadism     200mg/ml Testosterone Cypionate administered with 22 gaugeG 1 inch needle.      Patient has given me verbal consent to perform Testosterone Injection.  Yes     Following Nickie WHEELER plan of care  Testosterone 200mg/ml GIVEN I.M. left UOQ hip- administered 1 ml of testosterone to patient  Lot Number: [id number]  NDC #: 0643-2441-50  Exp: 10/31/27     Patient supplied his own medications  Yes     Patient to return in 2 weeks for next injection.

## 2025-05-05 ENCOUNTER — OFFICE VISIT (OUTPATIENT)
Dept: FAMILY MEDICINE CLINIC | Age: 65
End: 2025-05-05
Payer: COMMERCIAL

## 2025-05-05 VITALS
TEMPERATURE: 98 F | DIASTOLIC BLOOD PRESSURE: 84 MMHG | HEART RATE: 68 BPM | RESPIRATION RATE: 16 BRPM | BODY MASS INDEX: 31.49 KG/M2 | WEIGHT: 212.6 LBS | HEIGHT: 69 IN | SYSTOLIC BLOOD PRESSURE: 136 MMHG

## 2025-05-05 DIAGNOSIS — M54.41 ACUTE RIGHT-SIDED LOW BACK PAIN WITH RIGHT-SIDED SCIATICA: Primary | ICD-10-CM

## 2025-05-05 PROCEDURE — G8417 CALC BMI ABV UP PARAM F/U: HCPCS | Performed by: STUDENT IN AN ORGANIZED HEALTH CARE EDUCATION/TRAINING PROGRAM

## 2025-05-05 PROCEDURE — 96372 THER/PROPH/DIAG INJ SC/IM: CPT | Performed by: STUDENT IN AN ORGANIZED HEALTH CARE EDUCATION/TRAINING PROGRAM

## 2025-05-05 PROCEDURE — 3075F SYST BP GE 130 - 139MM HG: CPT | Performed by: STUDENT IN AN ORGANIZED HEALTH CARE EDUCATION/TRAINING PROGRAM

## 2025-05-05 PROCEDURE — 1123F ACP DISCUSS/DSCN MKR DOCD: CPT | Performed by: STUDENT IN AN ORGANIZED HEALTH CARE EDUCATION/TRAINING PROGRAM

## 2025-05-05 PROCEDURE — 99213 OFFICE O/P EST LOW 20 MIN: CPT | Performed by: STUDENT IN AN ORGANIZED HEALTH CARE EDUCATION/TRAINING PROGRAM

## 2025-05-05 PROCEDURE — 1036F TOBACCO NON-USER: CPT | Performed by: STUDENT IN AN ORGANIZED HEALTH CARE EDUCATION/TRAINING PROGRAM

## 2025-05-05 PROCEDURE — 3017F COLORECTAL CA SCREEN DOC REV: CPT | Performed by: STUDENT IN AN ORGANIZED HEALTH CARE EDUCATION/TRAINING PROGRAM

## 2025-05-05 PROCEDURE — G8427 DOCREV CUR MEDS BY ELIG CLIN: HCPCS | Performed by: STUDENT IN AN ORGANIZED HEALTH CARE EDUCATION/TRAINING PROGRAM

## 2025-05-05 PROCEDURE — 3079F DIAST BP 80-89 MM HG: CPT | Performed by: STUDENT IN AN ORGANIZED HEALTH CARE EDUCATION/TRAINING PROGRAM

## 2025-05-05 RX ORDER — CYCLOBENZAPRINE HCL 10 MG
10 TABLET ORAL 3 TIMES DAILY PRN
Qty: 30 TABLET | Refills: 0 | Status: SHIPPED | OUTPATIENT
Start: 2025-05-05

## 2025-05-05 RX ORDER — METHYLPREDNISOLONE ACETATE 80 MG/ML
80 INJECTION, SUSPENSION INTRA-ARTICULAR; INTRALESIONAL; INTRAMUSCULAR; SOFT TISSUE ONCE
Status: COMPLETED | OUTPATIENT
Start: 2025-05-05 | End: 2025-05-05

## 2025-05-05 RX ADMIN — METHYLPREDNISOLONE ACETATE 80 MG: 80 INJECTION, SUSPENSION INTRA-ARTICULAR; INTRALESIONAL; INTRAMUSCULAR; SOFT TISSUE at 15:34

## 2025-05-05 SDOH — ECONOMIC STABILITY: FOOD INSECURITY: WITHIN THE PAST 12 MONTHS, THE FOOD YOU BOUGHT JUST DIDN'T LAST AND YOU DIDN'T HAVE MONEY TO GET MORE.: NEVER TRUE

## 2025-05-05 SDOH — ECONOMIC STABILITY: FOOD INSECURITY: WITHIN THE PAST 12 MONTHS, YOU WORRIED THAT YOUR FOOD WOULD RUN OUT BEFORE YOU GOT MONEY TO BUY MORE.: NEVER TRUE

## 2025-05-05 ASSESSMENT — PATIENT HEALTH QUESTIONNAIRE - PHQ9
4. FEELING TIRED OR HAVING LITTLE ENERGY: NOT AT ALL
6. FEELING BAD ABOUT YOURSELF - OR THAT YOU ARE A FAILURE OR HAVE LET YOURSELF OR YOUR FAMILY DOWN: NOT AT ALL
1. LITTLE INTEREST OR PLEASURE IN DOING THINGS: NOT AT ALL
10. IF YOU CHECKED OFF ANY PROBLEMS, HOW DIFFICULT HAVE THESE PROBLEMS MADE IT FOR YOU TO DO YOUR WORK, TAKE CARE OF THINGS AT HOME, OR GET ALONG WITH OTHER PEOPLE: NOT DIFFICULT AT ALL
7. TROUBLE CONCENTRATING ON THINGS, SUCH AS READING THE NEWSPAPER OR WATCHING TELEVISION: NOT AT ALL
SUM OF ALL RESPONSES TO PHQ QUESTIONS 1-9: 0
SUM OF ALL RESPONSES TO PHQ QUESTIONS 1-9: 0
3. TROUBLE FALLING OR STAYING ASLEEP: NOT AT ALL
SUM OF ALL RESPONSES TO PHQ QUESTIONS 1-9: 0
8. MOVING OR SPEAKING SO SLOWLY THAT OTHER PEOPLE COULD HAVE NOTICED. OR THE OPPOSITE, BEING SO FIGETY OR RESTLESS THAT YOU HAVE BEEN MOVING AROUND A LOT MORE THAN USUAL: NOT AT ALL
2. FEELING DOWN, DEPRESSED OR HOPELESS: NOT AT ALL
SUM OF ALL RESPONSES TO PHQ QUESTIONS 1-9: 0
5. POOR APPETITE OR OVEREATING: NOT AT ALL
9. THOUGHTS THAT YOU WOULD BE BETTER OFF DEAD, OR OF HURTING YOURSELF: NOT AT ALL

## 2025-05-05 ASSESSMENT — ENCOUNTER SYMPTOMS: BACK PAIN: 1

## 2025-05-05 NOTE — PROGRESS NOTES
After obtaining consent, and per orders of Dr. Ricardo, injection of DepoMedrol 80 mg IM right deltoid given by JULIO CESAR WYMAN RN. Patient tolerated and left after injection.   
pulses.      Heart sounds: No murmur heard.  Pulmonary:      Effort: Pulmonary effort is normal. No respiratory distress.   Abdominal:      General: Bowel sounds are normal. There is no distension.      Palpations: Abdomen is soft. There is no mass.      Tenderness: There is no abdominal tenderness.   Musculoskeletal:      Cervical back: Neck supple.      Lumbar back: Spasms and tenderness present. Normal range of motion. Positive right straight leg raise test. Negative left straight leg raise test.        Back:    Skin:     General: Skin is warm.   Neurological:      General: No focal deficit present.      Mental Status: He is alert and oriented to person, place, and time.   Psychiatric:         Mood and Affect: Mood normal.         Behavior: Behavior normal.           Immunization History   Administered Date(s) Administered    COVID-19, MODERNA BLUE border, Primary or Immunocompromised, (age 12y+), IM, 100 mcg/0.5mL 03/09/2021, 04/06/2021, 10/25/2021    Influenza Virus Vaccine 10/01/2013, 11/09/2015, 11/15/2016, 11/03/2020, 10/24/2021    Influenza Whole 11/01/2011    Influenza, AFLURIA (age 3 y+), FLUZONE, (age 6 mo+), Quadv MDV, 0.5mL 10/25/2019    Influenza, AFLURIA, FLUZONE, (age4 y+), IM, Trivalent MDV, 0.5mL 10/24/2021    Influenza, FLUARIX, FLULAVAL, FLUZONE (age 6 mo+) and AFLURIA, (age 3 y+), Quadv PF, 0.5mL 11/09/2018, 10/25/2019, 11/03/2020    Influenza, FLUCELVAX, (age 6 mo+) IM, Trivalent PF, 0.5mL 11/15/2024    Influenza, FLUCELVAX, (age 6 mo+), MDCK, Quadv PF, 0.5mL 10/29/2023    TDaP, ADACEL (age 10y-64y), BOOSTRIX (age 10y+), IM, 0.5mL 08/13/2013    Zoster Recombinant (Shingrix) 10/29/2023         Assessment / Plan:     1. Acute right-sided low back pain with right-sided sciatica  Acute issue today.  No red flag warning signs on exam.  Will treat with Depo-Medrol 80 mg IM injection.  Flexeril as needed for spasm.  Stretching exercises provided, recommend doing these 1-2x/day.  Recommend frequent

## 2025-05-06 ENCOUNTER — CLINICAL SUPPORT (OUTPATIENT)
Dept: UROLOGY | Age: 65
End: 2025-05-06
Payer: COMMERCIAL

## 2025-05-06 DIAGNOSIS — E29.1 HYPOGONADISM IN MALE: Primary | ICD-10-CM

## 2025-05-06 PROCEDURE — 96372 THER/PROPH/DIAG INJ SC/IM: CPT

## 2025-05-06 RX ORDER — TESTOSTERONE CYPIONATE 200 MG/ML
200 INJECTION, SOLUTION INTRAMUSCULAR ONCE
Status: COMPLETED | OUTPATIENT
Start: 2025-05-06 | End: 2025-05-06

## 2025-05-06 RX ADMIN — TESTOSTERONE CYPIONATE 200 MG: 200 INJECTION, SOLUTION INTRAMUSCULAR at 08:38

## 2025-05-06 NOTE — PROGRESS NOTES
DIAGNOSIS: low testosterone, male hypogonadism     200mg/ml Testosterone Cypionate administered with 22 gaugeG 1 inch needle.      Patient has given me verbal consent to perform Testosterone Injection.  Yes     Following Nickie WHEELER plan of care  Testosterone 200mg/ml GIVEN I.M. right UOQ hip- administered 1 ml of testosterone to patient  Lot Number: [id number]  NDC #: 9835-2972-27  Exp: 10/31/27     Patient supplied his own medications  Yes     Patient to return in 2 weeks for next injection.

## 2025-05-06 NOTE — PROGRESS NOTES
I have personally verified, reviewed, and approved these actions.    Pt saw Vipul Duron in 2/2025 after having labs completed 1/2025.   Has f/u in 7/2025 with Vipul Duron PA-C  He otherwise continues to receive testosterone injections every 2 weeks

## 2025-05-21 ENCOUNTER — CLINICAL SUPPORT (OUTPATIENT)
Dept: UROLOGY | Age: 65
End: 2025-05-21
Payer: COMMERCIAL

## 2025-05-21 DIAGNOSIS — E29.1 HYPOGONADISM IN MALE: Primary | ICD-10-CM

## 2025-05-21 PROCEDURE — 96372 THER/PROPH/DIAG INJ SC/IM: CPT | Performed by: NURSE PRACTITIONER

## 2025-05-21 RX ORDER — TESTOSTERONE CYPIONATE 200 MG/ML
200 INJECTION, SOLUTION INTRAMUSCULAR ONCE
Status: COMPLETED | OUTPATIENT
Start: 2025-05-21 | End: 2025-05-21

## 2025-05-21 RX ADMIN — TESTOSTERONE CYPIONATE 200 MG: 200 INJECTION, SOLUTION INTRAMUSCULAR at 08:36

## 2025-05-21 NOTE — PROGRESS NOTES
DIAGNOSIS: low testosterone, male hypogonadism      200mg/ml Testosterone Cypionate administered with 22 gaugeG 1 inch needle.      Patient has given me verbal consent to perform Testosterone Injection.  Yes     Following Vipul WHEELER plan of care  Testosterone 200mg/ml GIVEN I.M. LEFT UOQ hip- administered 1 ml of testosterone to patient  Lot Number: [id number]  NDC #: 0541-5247-53  Exp: 12/31/27     Patient supplied his own medications  Yes     Patient to return in 2 weeks for next injection.            Pt saw Vipul Duron in 2/2025 after having labs completed 1/2025.   Has f/u in 7/2025 with Vipul Duron PA-C  He otherwise continues to receive testosterone injections every 2 weeks      Jessica ALVARADO-CNP  Urology

## 2025-06-02 ENCOUNTER — OFFICE VISIT (OUTPATIENT)
Dept: FAMILY MEDICINE CLINIC | Age: 65
End: 2025-06-02
Payer: COMMERCIAL

## 2025-06-02 VITALS
BODY MASS INDEX: 31.44 KG/M2 | SYSTOLIC BLOOD PRESSURE: 116 MMHG | DIASTOLIC BLOOD PRESSURE: 84 MMHG | HEART RATE: 64 BPM | TEMPERATURE: 97.7 F | RESPIRATION RATE: 16 BRPM | WEIGHT: 213 LBS

## 2025-06-02 DIAGNOSIS — J06.9 ACUTE URI: Primary | ICD-10-CM

## 2025-06-02 DIAGNOSIS — H69.93 DYSFUNCTION OF BOTH EUSTACHIAN TUBES: ICD-10-CM

## 2025-06-02 PROCEDURE — 1036F TOBACCO NON-USER: CPT | Performed by: STUDENT IN AN ORGANIZED HEALTH CARE EDUCATION/TRAINING PROGRAM

## 2025-06-02 PROCEDURE — 3017F COLORECTAL CA SCREEN DOC REV: CPT | Performed by: STUDENT IN AN ORGANIZED HEALTH CARE EDUCATION/TRAINING PROGRAM

## 2025-06-02 PROCEDURE — G8417 CALC BMI ABV UP PARAM F/U: HCPCS | Performed by: STUDENT IN AN ORGANIZED HEALTH CARE EDUCATION/TRAINING PROGRAM

## 2025-06-02 PROCEDURE — 99213 OFFICE O/P EST LOW 20 MIN: CPT | Performed by: STUDENT IN AN ORGANIZED HEALTH CARE EDUCATION/TRAINING PROGRAM

## 2025-06-02 PROCEDURE — 3074F SYST BP LT 130 MM HG: CPT | Performed by: STUDENT IN AN ORGANIZED HEALTH CARE EDUCATION/TRAINING PROGRAM

## 2025-06-02 PROCEDURE — G8427 DOCREV CUR MEDS BY ELIG CLIN: HCPCS | Performed by: STUDENT IN AN ORGANIZED HEALTH CARE EDUCATION/TRAINING PROGRAM

## 2025-06-02 PROCEDURE — 1123F ACP DISCUSS/DSCN MKR DOCD: CPT | Performed by: STUDENT IN AN ORGANIZED HEALTH CARE EDUCATION/TRAINING PROGRAM

## 2025-06-02 PROCEDURE — 3079F DIAST BP 80-89 MM HG: CPT | Performed by: STUDENT IN AN ORGANIZED HEALTH CARE EDUCATION/TRAINING PROGRAM

## 2025-06-02 ASSESSMENT — ENCOUNTER SYMPTOMS
SINUS PAIN: 0
CONSTIPATION: 0
DIARRHEA: 0
ABDOMINAL PAIN: 0
SHORTNESS OF BREATH: 0
SORE THROAT: 1
COUGH: 1
NAUSEA: 0
SINUS PRESSURE: 0
VOMITING: 0

## 2025-06-02 NOTE — PROGRESS NOTES
Coricidin HBP PRN for symptoms. Recommend increased hydration. Due to patient's upcoming travel, paper Rx for Augmentin printed - recommend starting this only if symptoms do not improve over the next 3-5 days, or if he develops fever or acute worsening of symptoms.  - amoxicillin-clavulanate (AUGMENTIN) 875-125 MG per tablet; Take 1 tablet by mouth 2 times daily for 7 days  Dispense: 14 tablet; Refill: 0    2. Dysfunction of both eustachian tubes  Acute issue on exam, also contributing to symptoms. Recommend Flonase daily to help.         Return if symptoms worsen or fail to improve.          Medications Prescribed:  Orders Placed This Encounter   Medications    amoxicillin-clavulanate (AUGMENTIN) 875-125 MG per tablet     Sig: Take 1 tablet by mouth 2 times daily for 7 days     Dispense:  14 tablet     Refill:  0       Future Appointments   Date Time Provider Department Center   6/11/2025  8:00 AM Mahamed Varela APRN - CNP [facility] [place] [facility] [place]   7/31/2025  8:15 AM Vipul Duron PA-C [facility] [place] [facility] [place]       Patient given educational materials - see patient instructions.  Discussed use, benefit, and sideeffects of prescribed medications.  All patient questions answered.  Pt voiced understanding. Reviewed health maintenance.  Instructed to continue current medications, diet and exercise.  Patient agreed with treatment plan.Follow up as directed.     The patient (or guardian, if applicable) and other individuals in attendance with the patient were advised that Artificial Intelligence will be utilized during this visit to record, process the conversation to generate a clinical note, and support improvement of the AI technology. The patient (or guardian, if applicable) and other individuals in attendance at the appointment consented to the use of AI, including the recording.          Electronically signed by Geno Ricardo MD on 6/2/2025 at 1:41 PM

## 2025-06-11 ENCOUNTER — CLINICAL SUPPORT (OUTPATIENT)
Dept: UROLOGY | Age: 65
End: 2025-06-11
Payer: COMMERCIAL

## 2025-06-11 DIAGNOSIS — E29.1 HYPOGONADISM IN MALE: Primary | ICD-10-CM

## 2025-06-11 PROCEDURE — 96372 THER/PROPH/DIAG INJ SC/IM: CPT

## 2025-06-11 RX ORDER — TESTOSTERONE CYPIONATE 200 MG/ML
200 INJECTION, SOLUTION INTRAMUSCULAR ONCE
Status: COMPLETED | OUTPATIENT
Start: 2025-06-11 | End: 2025-06-11

## 2025-06-11 RX ADMIN — TESTOSTERONE CYPIONATE 200 MG: 200 INJECTION, SOLUTION INTRAMUSCULAR at 08:01

## 2025-06-11 NOTE — PROGRESS NOTES
DIAGNOSIS: low testosterone, male hypogonadism     200mg/ml Testosterone Cypionate administered with 22 gaugeG 1 inch needle.      Patient has given me verbal consent to perform Testosterone Injection.  Yes     Following Mahamed ALVARADO plan of care  Testosterone 200mg/ml GIVEN I.M. right UOQ hip- administered 1 ml of testosterone to patient  Lot Number: [id number]  NDC #: 4603-8268-80  Exp: 12/31/27     Patient supplied his own medications  Yes     Patient to return in 2 weeks for next injection.

## 2025-06-11 NOTE — PROGRESS NOTES
I have personally reviewed, verified, and approved of the actions and the plan of care as documented.

## 2025-06-12 ENCOUNTER — TELEPHONE (OUTPATIENT)
Dept: FAMILY MEDICINE CLINIC | Age: 65
End: 2025-06-12

## 2025-06-12 DIAGNOSIS — I10 PRIMARY HYPERTENSION: ICD-10-CM

## 2025-06-12 DIAGNOSIS — I10 ESSENTIAL HYPERTENSION: ICD-10-CM

## 2025-06-12 DIAGNOSIS — R73.01 IFG (IMPAIRED FASTING GLUCOSE): ICD-10-CM

## 2025-06-12 DIAGNOSIS — K21.9 GASTROESOPHAGEAL REFLUX DISEASE, UNSPECIFIED WHETHER ESOPHAGITIS PRESENT: ICD-10-CM

## 2025-06-12 DIAGNOSIS — Z00.00 ANNUAL PHYSICAL EXAM: Primary | ICD-10-CM

## 2025-06-12 RX ORDER — AMLODIPINE BESYLATE 5 MG/1
5 TABLET ORAL DAILY
Qty: 30 TABLET | Refills: 1 | Status: SHIPPED | OUTPATIENT
Start: 2025-06-12

## 2025-06-12 RX ORDER — METOPROLOL SUCCINATE 100 MG/1
100 TABLET, EXTENDED RELEASE ORAL DAILY
Qty: 30 TABLET | Refills: 1 | Status: SHIPPED | OUTPATIENT
Start: 2025-06-12

## 2025-06-12 RX ORDER — ESOMEPRAZOLE MAGNESIUM 40 MG/1
40 CAPSULE, DELAYED RELEASE ORAL
Qty: 30 CAPSULE | Refills: 1 | Status: SHIPPED | OUTPATIENT
Start: 2025-06-12

## 2025-06-12 RX ORDER — VALSARTAN AND HYDROCHLOROTHIAZIDE 320; 25 MG/1; MG/1
1 TABLET, FILM COATED ORAL DAILY
Qty: 30 TABLET | Refills: 1 | Status: SHIPPED | OUTPATIENT
Start: 2025-06-12

## 2025-06-12 NOTE — TELEPHONE ENCOUNTER
Rx EP'd to pharmacy.  Please notify patient.      Requested Prescriptions     Signed Prescriptions Disp Refills    esomeprazole (NEXIUM) 40 MG delayed release capsule 30 capsule 1     Sig: Take 1 capsule by mouth every morning (before breakfast)     Authorizing Provider: TIFFANIE CARDENAS    metoprolol succinate (TOPROL XL) 100 MG extended release tablet 30 tablet 1     Sig: Take 1 tablet by mouth daily     Authorizing Provider: TIFFANIE CARDENAS    valsartan-hydroCHLOROthiazide (DIOVAN-HCT) 320-25 MG per tablet 30 tablet 1     Sig: Take 1 tablet by mouth daily     Authorizing Provider: TIFFANIE CARDENAS    amLODIPine (NORVASC) 5 MG tablet 30 tablet 1     Sig: Take 1 tablet by mouth daily     Authorizing Provider: TIFFANIE CARDENAS     Lab orders pended.      Electronically signed by Tiffanie Cardenas MD on 6/12/2025 at 9:48 AM

## 2025-06-12 NOTE — TELEPHONE ENCOUNTER
Patient called office to schedule Annual Physical. Requesting orders to complete lab work before appointment scheduled for 7/28. Patient states he may need refills before his appointment, as his prescriptions run out at the end of June. Medications pended for #30/1.

## 2025-06-19 DIAGNOSIS — I10 ESSENTIAL HYPERTENSION: ICD-10-CM

## 2025-06-19 DIAGNOSIS — K21.9 GASTROESOPHAGEAL REFLUX DISEASE, UNSPECIFIED WHETHER ESOPHAGITIS PRESENT: ICD-10-CM

## 2025-06-19 RX ORDER — METOPROLOL SUCCINATE 100 MG/1
100 TABLET, EXTENDED RELEASE ORAL DAILY
Qty: 90 TABLET | Refills: 3 | Status: SHIPPED | OUTPATIENT
Start: 2025-06-19

## 2025-06-19 RX ORDER — ESOMEPRAZOLE MAGNESIUM 40 MG/1
40 CAPSULE, DELAYED RELEASE ORAL
Qty: 90 CAPSULE | Refills: 3 | Status: SHIPPED | OUTPATIENT
Start: 2025-06-19

## 2025-06-19 RX ORDER — VALSARTAN AND HYDROCHLOROTHIAZIDE 320; 25 MG/1; MG/1
1 TABLET, FILM COATED ORAL DAILY
Qty: 90 TABLET | Refills: 3 | Status: SHIPPED | OUTPATIENT
Start: 2025-06-19

## 2025-06-19 NOTE — TELEPHONE ENCOUNTER
This medication refill is regarding an electronic request. Refill requested by [facility]. Requesting a 90 day supply.    Requested Prescriptions     Pending Prescriptions Disp Refills    esomeprazole (NEXIUM) 40 MG delayed release capsule [Pharmacy Med Name: ESOMEPRAZOLE MAGNESIUM 40MG DR CAPS] 90 capsule      Sig: TAKE 1 CAPSULE BY MOUTH EVERY MORNING BEFORE BREAKFAST    metoprolol succinate (TOPROL XL) 100 MG extended release tablet [Pharmacy Med Name: METOPROLOL ER SUCCINATE 100MG TABS] 90 tablet      Sig: TAKE 1 TABLET BY MOUTH DAILY    valsartan-hydroCHLOROthiazide (DIOVAN-HCT) 320-25 MG per tablet [Pharmacy Med Name: VALSARTAN/HCTZ 320MG/25MG TABLETS] 90 tablet      Sig: TAKE 1 TABLET BY MOUTH DAILY       Date of last visit: 6/2/2025   Date of next visit: 7/28/2025  Date of last refill: 6/12/25 for 30/1  Pharmacy Name: [facility]    Rx's verified, ordered and set to EP.

## 2025-06-25 ENCOUNTER — CLINICAL SUPPORT (OUTPATIENT)
Dept: UROLOGY | Age: 65
End: 2025-06-25
Payer: COMMERCIAL

## 2025-06-25 DIAGNOSIS — E29.1 HYPOGONADISM IN MALE: Primary | ICD-10-CM

## 2025-06-25 PROCEDURE — 96372 THER/PROPH/DIAG INJ SC/IM: CPT

## 2025-06-25 RX ORDER — TESTOSTERONE CYPIONATE 200 MG/ML
200 INJECTION, SOLUTION INTRAMUSCULAR ONCE
Status: COMPLETED | OUTPATIENT
Start: 2025-06-25 | End: 2025-06-25

## 2025-06-25 RX ADMIN — TESTOSTERONE CYPIONATE 200 MG: 200 INJECTION, SOLUTION INTRAMUSCULAR at 08:12

## 2025-06-25 NOTE — PROGRESS NOTES
DIAGNOSIS: low testosterone, male hypogonadism     200mg/ml Testosterone Cypionate administered with 22 gaugeG 1 inch needle.      Patient has given me verbal consent to perform Testosterone Injection.  Yes     Following Mahamed ALVARADO plan of care  Testosterone 200mg/ml GIVEN I.M. left UOQ hip- administered 1 ml of testosterone to patient  Lot Number: [id number]  NDC #: 6674-6465-29  Exp: 12/31/27     Patient supplied his own medications  Yes     Patient to return in 2 weeks for next injection.

## 2025-06-30 DIAGNOSIS — I10 PRIMARY HYPERTENSION: ICD-10-CM

## 2025-06-30 RX ORDER — AMLODIPINE BESYLATE 5 MG/1
5 TABLET ORAL DAILY
Qty: 30 TABLET | Refills: 1 | OUTPATIENT
Start: 2025-06-30

## 2025-06-30 NOTE — TELEPHONE ENCOUNTER
Request sent from [facility] pharmacy for refill of amlodipine 5 mg qd.  Last seen 6/2/25, next appt 7/28/25.  Rx just sent.   This request refused.

## 2025-07-09 ENCOUNTER — CLINICAL SUPPORT (OUTPATIENT)
Dept: UROLOGY | Age: 65
End: 2025-07-09
Payer: COMMERCIAL

## 2025-07-09 DIAGNOSIS — E29.1 HYPOGONADISM IN MALE: Primary | ICD-10-CM

## 2025-07-09 DIAGNOSIS — R79.89 LOW TESTOSTERONE: ICD-10-CM

## 2025-07-09 PROCEDURE — 96372 THER/PROPH/DIAG INJ SC/IM: CPT

## 2025-07-09 RX ORDER — TESTOSTERONE CYPIONATE 200 MG/ML
200 INJECTION, SOLUTION INTRAMUSCULAR ONCE
Status: COMPLETED | OUTPATIENT
Start: 2025-07-09 | End: 2025-07-09

## 2025-07-09 RX ORDER — TESTOSTERONE CYPIONATE 200 MG/ML
200 INJECTION, SOLUTION INTRAMUSCULAR
Qty: 12 ML | Refills: 0 | Status: SHIPPED | OUTPATIENT
Start: 2025-07-09 | End: 2025-12-24

## 2025-07-09 RX ADMIN — TESTOSTERONE CYPIONATE 200 MG: 200 INJECTION, SOLUTION INTRAMUSCULAR at 08:38

## 2025-07-09 NOTE — PROGRESS NOTES
I have personally verified, reviewed, and approved of these actions and the plan for follow-up as documented by Payal Acuña LPN.

## 2025-07-09 NOTE — PROGRESS NOTES
DIAGNOSIS: low testosterone, male hypogonadism     200mg/ml Testosterone Cypionate administered with 22 gaugeG 1 inch needle.      Patient has given me verbal consent to perform Testosterone Injection.  Yes     Following Vipul WHEELER plan of care  Testosterone 200mg/ml GIVEN I.M. right UOQ hip- administered 1 ml of testosterone to patient  Lot Number: [id number]  NDC #: 7575-6992-96  Exp: 12/31/27     Patient supplied his own medications  Yes     Patient to return in 2 weeks for next injection.

## 2025-07-17 ENCOUNTER — LAB (OUTPATIENT)
Dept: LAB | Age: 65
End: 2025-07-17

## 2025-07-17 DIAGNOSIS — R73.01 IFG (IMPAIRED FASTING GLUCOSE): ICD-10-CM

## 2025-07-17 DIAGNOSIS — Z00.00 ANNUAL PHYSICAL EXAM: ICD-10-CM

## 2025-07-17 DIAGNOSIS — E29.1 HYPOGONADISM IN MALE: ICD-10-CM

## 2025-07-17 DIAGNOSIS — R79.89 LOW TESTOSTERONE: ICD-10-CM

## 2025-07-17 LAB
ALBUMIN SERPL BCG-MCNC: 4.5 G/DL (ref 3.4–4.9)
ALBUMIN SERPL BCG-MCNC: 4.5 G/DL (ref 3.4–4.9)
ALP SERPL-CCNC: 48 U/L (ref 40–129)
ALP SERPL-CCNC: 48 U/L (ref 40–129)
ALT SERPL W/O P-5'-P-CCNC: 30 U/L (ref 10–50)
ALT SERPL W/O P-5'-P-CCNC: 31 U/L (ref 10–50)
ANION GAP SERPL CALC-SCNC: 12 MEQ/L (ref 8–16)
AST SERPL-CCNC: 30 U/L (ref 10–50)
AST SERPL-CCNC: 31 U/L (ref 10–50)
BILIRUB CONJ SERPL-MCNC: < 0.1 MG/DL (ref 0–0.2)
BILIRUB SERPL-MCNC: 0.4 MG/DL (ref 0.3–1.2)
BILIRUB SERPL-MCNC: 0.4 MG/DL (ref 0.3–1.2)
BUN SERPL-MCNC: 16 MG/DL (ref 8–23)
CALCIUM SERPL-MCNC: 10.2 MG/DL (ref 8.8–10.2)
CHLORIDE SERPL-SCNC: 94 MEQ/L (ref 98–111)
CHOLEST SERPL-MCNC: 188 MG/DL (ref 100–199)
CO2 SERPL-SCNC: 26 MEQ/L (ref 22–29)
CREAT SERPL-MCNC: 1.1 MG/DL (ref 0.7–1.2)
DEPRECATED MEAN GLUCOSE BLD GHB EST-ACNC: 135 MG/DL (ref 70–126)
GFR SERPL CREATININE-BSD FRML MDRD: 74 ML/MIN/1.73M2
GLUCOSE SERPL-MCNC: 122 MG/DL (ref 74–109)
HBA1C MFR BLD HPLC: 6.5 % (ref 4–6)
HCT VFR BLD AUTO: 45.8 % (ref 42–52)
HDLC SERPL-MCNC: 24 MG/DL
HGB BLD-MCNC: 15.7 GM/DL (ref 14–18)
LDLC SERPL CALC-MCNC: 101 MG/DL
POTASSIUM SERPL-SCNC: 4.3 MEQ/L (ref 3.5–5.2)
PROT SERPL-MCNC: 7.1 G/DL (ref 6.4–8.3)
PROT SERPL-MCNC: 7.1 G/DL (ref 6.4–8.3)
PSA SERPL-MCNC: 0.4 NG/ML (ref 0–1)
SODIUM SERPL-SCNC: 132 MEQ/L (ref 135–145)
TESTOST SERPL-MCNC: 793 NG/DL (ref 193–740)
TRIGL SERPL-MCNC: 313 MG/DL (ref 0–199)

## 2025-07-23 ENCOUNTER — OFFICE VISIT (OUTPATIENT)
Dept: UROLOGY | Age: 65
End: 2025-07-23
Payer: COMMERCIAL

## 2025-07-23 VITALS — WEIGHT: 213 LBS | RESPIRATION RATE: 18 BRPM | BODY MASS INDEX: 31.55 KG/M2 | HEIGHT: 69 IN

## 2025-07-23 DIAGNOSIS — N52.9 ERECTILE DYSFUNCTION, UNSPECIFIED ERECTILE DYSFUNCTION TYPE: ICD-10-CM

## 2025-07-23 DIAGNOSIS — E29.1 HYPOGONADISM IN MALE: Primary | ICD-10-CM

## 2025-07-23 DIAGNOSIS — N48.6 PEYRONIE'S DISEASE: ICD-10-CM

## 2025-07-23 DIAGNOSIS — R79.89 LOW TESTOSTERONE: ICD-10-CM

## 2025-07-23 PROCEDURE — 1036F TOBACCO NON-USER: CPT

## 2025-07-23 PROCEDURE — 3017F COLORECTAL CA SCREEN DOC REV: CPT

## 2025-07-23 PROCEDURE — G8417 CALC BMI ABV UP PARAM F/U: HCPCS

## 2025-07-23 PROCEDURE — 1123F ACP DISCUSS/DSCN MKR DOCD: CPT

## 2025-07-23 PROCEDURE — 96372 THER/PROPH/DIAG INJ SC/IM: CPT

## 2025-07-23 PROCEDURE — G8427 DOCREV CUR MEDS BY ELIG CLIN: HCPCS

## 2025-07-23 PROCEDURE — 99214 OFFICE O/P EST MOD 30 MIN: CPT

## 2025-07-23 RX ORDER — TESTOSTERONE CYPIONATE 200 MG/ML
200 INJECTION, SOLUTION INTRAMUSCULAR ONCE
Status: COMPLETED | OUTPATIENT
Start: 2025-07-23 | End: 2025-07-23

## 2025-07-23 RX ADMIN — TESTOSTERONE CYPIONATE 200 MG: 200 INJECTION, SOLUTION INTRAMUSCULAR at 08:59

## 2025-07-23 NOTE — PROGRESS NOTES
testosterone cypionate (DEPOTESTOTERONE CYPIONATE) 200 MG/ML injection Inject 1 mL into the muscle every 14 days for 168 days. Max Daily Amount: 200 mg 12 mL 0    esomeprazole (NEXIUM) 40 MG delayed release capsule TAKE 1 CAPSULE BY MOUTH EVERY MORNING BEFORE BREAKFAST 90 capsule 3    metoprolol succinate (TOPROL XL) 100 MG extended release tablet TAKE 1 TABLET BY MOUTH DAILY 90 tablet 3    valsartan-hydroCHLOROthiazide (DIOVAN-HCT) 320-25 MG per tablet TAKE 1 TABLET BY MOUTH DAILY 90 tablet 3    amLODIPine (NORVASC) 5 MG tablet Take 1 tablet by mouth daily 30 tablet 1    sildenafil (VIAGRA) 100 MG tablet TAKE 1 TABLET BY MOUTH DAILY AS NEEDED FOR ERECTILE DYSFUNCTION 30 tablet 3    valACYclovir (VALTREX) 500 MG tablet TAKE 1 TABLET BY MOUTH DAILY 90 tablet 3    ARIPiprazole (ABILIFY) 2 MG tablet Take 1 tablet by mouth daily      ibuprofen (ADVIL;MOTRIN) 200 MG tablet Take 1 tablet by mouth every 6 hours as needed for Pain      clomiPRAMINE (ANAFRANIL) 50 MG capsule 4 tabs at bedtime prn 120 capsule 5       Patient has no known allergies.  Social History     Tobacco Use   Smoking Status Never   Smokeless Tobacco Never       Social History     Substance and Sexual Activity   Alcohol Use No       REVIEW OF SYSTEMS:  Constitutional: negative  Eyes: negative  Respiratory: negative  Cardiovascular: negative  Gastrointestinal: negative  Musculoskeletal: negative  Genitourinary: negative except for what is in HPI  Skin: negative   Neurological: negative  Hematological/Lymphatic: negative  Psychological: negative    Physical Exam:      Vitals:    07/23/25 0823   Resp: 18       Patient is a 64 y.o. male in no acute distress and alert and oriented to person, place and time.    Pulmonary: Non-labored respiration.  Cardiovascular: Normal rate, regular rhythm, normal peripheral pulses.  Skin: Warm and dry.  Psych: Normal mood and affect.  Genitourinary: Bladder non-distended and non-tender.      Assessment and Plan   Low

## 2025-07-28 ENCOUNTER — OFFICE VISIT (OUTPATIENT)
Dept: FAMILY MEDICINE CLINIC | Age: 65
End: 2025-07-28
Payer: COMMERCIAL

## 2025-07-28 VITALS
DIASTOLIC BLOOD PRESSURE: 82 MMHG | SYSTOLIC BLOOD PRESSURE: 132 MMHG | HEIGHT: 69 IN | TEMPERATURE: 97.8 F | RESPIRATION RATE: 16 BRPM | HEART RATE: 64 BPM | WEIGHT: 210.8 LBS | BODY MASS INDEX: 31.22 KG/M2

## 2025-07-28 DIAGNOSIS — Z00.00 ANNUAL PHYSICAL EXAM: Primary | ICD-10-CM

## 2025-07-28 DIAGNOSIS — R73.01 IFG (IMPAIRED FASTING GLUCOSE): ICD-10-CM

## 2025-07-28 DIAGNOSIS — I10 PRIMARY HYPERTENSION: ICD-10-CM

## 2025-07-28 PROCEDURE — 3079F DIAST BP 80-89 MM HG: CPT | Performed by: FAMILY MEDICINE

## 2025-07-28 PROCEDURE — 99397 PER PM REEVAL EST PAT 65+ YR: CPT | Performed by: FAMILY MEDICINE

## 2025-07-28 PROCEDURE — 3075F SYST BP GE 130 - 139MM HG: CPT | Performed by: FAMILY MEDICINE

## 2025-07-28 RX ORDER — AMLODIPINE BESYLATE 5 MG/1
5 TABLET ORAL DAILY
Qty: 90 TABLET | Refills: 3 | Status: SHIPPED | OUTPATIENT
Start: 2025-07-28

## 2025-07-28 ASSESSMENT — ENCOUNTER SYMPTOMS
SHORTNESS OF BREATH: 0
CHEST TIGHTNESS: 0
BLOOD IN STOOL: 0
EYES NEGATIVE: 1
ABDOMINAL PAIN: 0

## 2025-07-28 NOTE — PROGRESS NOTES
Health Maintenance Due   Topic Date Due    Pneumococcal 50+ years Vaccine (1 of 1 - PCV) Never done    DTaP/Tdap/Td vaccine (2 - Td or Tdap) 08/13/2023    COVID-19 Vaccine (4 - 2024-25 season) 09/01/2024

## 2025-07-29 NOTE — ASSESSMENT & PLAN NOTE
New, not at goal (unstable), patient has already made dietary changes and plans to treat this without medication if possible.  Recheck hemoglobin A1c in 3 months.    Orders:    Hemoglobin A1C; Future

## 2025-07-29 NOTE — PROGRESS NOTES
2025    Chief Complaint   Patient presents with    Annual Exam     Annual physical. Discuss recent labs.        Jamal Greene (:  1960) is a 64 y.o. male, here for a preventive medicine evaluation.    Subjective   Patient Active Problem List   Diagnosis    HTN (hypertension)    Hypertriglyceridemia    GERD (gastroesophageal reflux disease)    Depression    Chronic prostatitis    Erectile dysfunction    Herpes infection    IFG (impaired fasting glucose)     History of Present Illness  The patient presents for a wellness visit.    He reports feeling well overall but expresses concern about his recent blood work results, which indicate elevated A1c and blood sugar levels. He attributes this to his dietary habits, including frequent consumption of desserts and ice cream. He has been making efforts to improve his diet over the past 10 days and has increased his water intake. He is interested in losing 20 pounds and is seeking advice on weight loss strategies. He has not been engaging in regular physical activity, except for the last 10 days.    He reports no chest pain, breathing difficulties, dizziness, or lightheadedness. He notes an improvement in his dizziness since managing his sodium levels. He maintains regular eye exams and dental visits. His bowel and bladder functions are normal, and he reports no presence of blood in his stool or urine. He also reports no swelling in his feet or ankles. He recently consulted a dermatologist for skin spots.    Occupations: Full-time worker, planning to retire at the end of the year  Diet: Reports frequent consumption of desserts and ice cream, has been careful with diet for the past 10 days    PAST SURGICAL HISTORY:  Colonoscopy in 2019, next due in 2029      Review of Systems   Constitutional:  Negative for chills, fatigue, fever and unexpected weight change.   HENT: Negative.     Eyes: Negative.    Respiratory:  Negative for chest tightness and

## 2025-08-06 ENCOUNTER — CLINICAL SUPPORT (OUTPATIENT)
Dept: UROLOGY | Age: 65
End: 2025-08-06
Payer: COMMERCIAL

## 2025-08-06 DIAGNOSIS — E29.1 HYPOGONADISM IN MALE: Primary | ICD-10-CM

## 2025-08-06 PROCEDURE — 96372 THER/PROPH/DIAG INJ SC/IM: CPT

## 2025-08-06 RX ORDER — TESTOSTERONE CYPIONATE 200 MG/ML
200 INJECTION, SOLUTION INTRAMUSCULAR ONCE
Status: COMPLETED | OUTPATIENT
Start: 2025-08-06 | End: 2025-08-06

## 2025-08-06 RX ADMIN — TESTOSTERONE CYPIONATE 200 MG: 200 INJECTION, SOLUTION INTRAMUSCULAR at 08:39

## 2025-08-17 DIAGNOSIS — K21.9 GASTROESOPHAGEAL REFLUX DISEASE, UNSPECIFIED WHETHER ESOPHAGITIS PRESENT: ICD-10-CM

## 2025-08-17 DIAGNOSIS — I10 ESSENTIAL HYPERTENSION: ICD-10-CM

## 2025-08-18 DIAGNOSIS — I10 ESSENTIAL HYPERTENSION: ICD-10-CM

## 2025-08-18 RX ORDER — ESOMEPRAZOLE MAGNESIUM 40 MG/1
40 CAPSULE, DELAYED RELEASE ORAL
Qty: 30 CAPSULE | OUTPATIENT
Start: 2025-08-18

## 2025-08-18 RX ORDER — VALSARTAN AND HYDROCHLOROTHIAZIDE 320; 25 MG/1; MG/1
1 TABLET, FILM COATED ORAL DAILY
Qty: 30 TABLET | OUTPATIENT
Start: 2025-08-18

## 2025-08-18 RX ORDER — METOPROLOL SUCCINATE 100 MG/1
100 TABLET, EXTENDED RELEASE ORAL DAILY
Qty: 30 TABLET | OUTPATIENT
Start: 2025-08-18

## 2025-08-20 ENCOUNTER — CLINICAL SUPPORT (OUTPATIENT)
Dept: UROLOGY | Age: 65
End: 2025-08-20
Payer: COMMERCIAL

## 2025-08-20 DIAGNOSIS — E29.1 HYPOGONADISM IN MALE: Primary | ICD-10-CM

## 2025-08-20 PROCEDURE — 96372 THER/PROPH/DIAG INJ SC/IM: CPT

## 2025-08-20 RX ORDER — TESTOSTERONE CYPIONATE 200 MG/ML
200 INJECTION, SOLUTION INTRAMUSCULAR ONCE
Status: COMPLETED | OUTPATIENT
Start: 2025-08-20 | End: 2025-08-20

## 2025-08-20 RX ADMIN — TESTOSTERONE CYPIONATE 200 MG: 200 INJECTION, SOLUTION INTRAMUSCULAR at 08:11

## 2025-09-03 ENCOUNTER — CLINICAL SUPPORT (OUTPATIENT)
Dept: UROLOGY | Age: 65
End: 2025-09-03
Payer: COMMERCIAL

## 2025-09-03 DIAGNOSIS — E29.1 HYPOGONADISM IN MALE: Primary | ICD-10-CM

## 2025-09-03 PROCEDURE — 99999 PR OFFICE/OUTPT VISIT,PROCEDURE ONLY: CPT

## 2025-09-03 PROCEDURE — 96372 THER/PROPH/DIAG INJ SC/IM: CPT

## 2025-09-03 RX ORDER — TESTOSTERONE CYPIONATE 200 MG/ML
200 INJECTION, SOLUTION INTRAMUSCULAR ONCE
Status: COMPLETED | OUTPATIENT
Start: 2025-09-03 | End: 2025-09-03

## 2025-09-03 RX ADMIN — TESTOSTERONE CYPIONATE 200 MG: 200 INJECTION, SOLUTION INTRAMUSCULAR at 08:13
